# Patient Record
Sex: FEMALE | Race: OTHER | HISPANIC OR LATINO | URBAN - METROPOLITAN AREA
[De-identification: names, ages, dates, MRNs, and addresses within clinical notes are randomized per-mention and may not be internally consistent; named-entity substitution may affect disease eponyms.]

---

## 2016-12-14 RX ORDER — LISINOPRIL 2.5 MG/1
2 TABLET ORAL
Qty: 30 | Refills: 0 | COMMUNITY
Start: 2016-12-14 | End: 2017-01-13

## 2016-12-14 RX ORDER — LISINOPRIL 2.5 MG/1
2 TABLET ORAL
Qty: 30 | Refills: 0 | DISCHARGE
Start: 2016-12-14 | End: 2017-01-13

## 2017-05-01 ENCOUNTER — INPATIENT (INPATIENT)
Facility: HOSPITAL | Age: 72
LOS: 2 days | Discharge: HOME | DRG: 884 | End: 2017-05-04
Attending: HOSPITALIST | Admitting: HOSPITALIST
Payer: COMMERCIAL

## 2017-05-01 VITALS
HEIGHT: 61 IN | SYSTOLIC BLOOD PRESSURE: 135 MMHG | RESPIRATION RATE: 18 BRPM | HEART RATE: 110 BPM | WEIGHT: 110.01 LBS | TEMPERATURE: 98 F | OXYGEN SATURATION: 100 % | DIASTOLIC BLOOD PRESSURE: 79 MMHG

## 2017-05-01 LAB
ALBUMIN SERPL ELPH-MCNC: 3.3 G/DL — LOW (ref 3.5–5)
ALP SERPL-CCNC: 81 U/L — SIGNIFICANT CHANGE UP (ref 40–120)
ALT FLD-CCNC: 15 U/L DA — SIGNIFICANT CHANGE UP (ref 10–60)
ANION GAP SERPL CALC-SCNC: 12 MMOL/L — SIGNIFICANT CHANGE UP (ref 5–17)
APPEARANCE UR: CLEAR — SIGNIFICANT CHANGE UP
AST SERPL-CCNC: 14 U/L — SIGNIFICANT CHANGE UP (ref 10–40)
BASOPHILS # BLD AUTO: 0 K/UL — SIGNIFICANT CHANGE UP (ref 0–0.2)
BASOPHILS NFR BLD AUTO: 0.5 % — SIGNIFICANT CHANGE UP (ref 0–2)
BILIRUB SERPL-MCNC: 0.7 MG/DL — SIGNIFICANT CHANGE UP (ref 0.2–1.2)
BILIRUB UR-MCNC: NEGATIVE — SIGNIFICANT CHANGE UP
BUN SERPL-MCNC: 39 MG/DL — HIGH (ref 7–18)
CALCIUM SERPL-MCNC: 8.9 MG/DL — SIGNIFICANT CHANGE UP (ref 8.4–10.5)
CHLORIDE SERPL-SCNC: 99 MMOL/L — SIGNIFICANT CHANGE UP (ref 96–108)
CO2 SERPL-SCNC: 28 MMOL/L — SIGNIFICANT CHANGE UP (ref 22–31)
COLOR SPEC: YELLOW — SIGNIFICANT CHANGE UP
CREAT SERPL-MCNC: 1.48 MG/DL — HIGH (ref 0.5–1.3)
DIFF PNL FLD: ABNORMAL
EOSINOPHIL # BLD AUTO: 0.1 K/UL — SIGNIFICANT CHANGE UP (ref 0–0.5)
EOSINOPHIL NFR BLD AUTO: 0.6 % — SIGNIFICANT CHANGE UP (ref 0–6)
GLUCOSE SERPL-MCNC: 275 MG/DL — HIGH (ref 70–99)
GLUCOSE UR QL: 1000 MG/DL
HCT VFR BLD CALC: 41.1 % — SIGNIFICANT CHANGE UP (ref 34.5–45)
HGB BLD-MCNC: 12.9 G/DL — SIGNIFICANT CHANGE UP (ref 11.5–15.5)
KETONES UR-MCNC: ABNORMAL
LACTATE SERPL-SCNC: 2 MMOL/L — SIGNIFICANT CHANGE UP (ref 0.7–2)
LEUKOCYTE ESTERASE UR-ACNC: NEGATIVE — SIGNIFICANT CHANGE UP
LYMPHOCYTES # BLD AUTO: 1.6 K/UL — SIGNIFICANT CHANGE UP (ref 1–3.3)
LYMPHOCYTES # BLD AUTO: 17.8 % — SIGNIFICANT CHANGE UP (ref 13–44)
MCHC RBC-ENTMCNC: 25.2 PG — LOW (ref 27–34)
MCHC RBC-ENTMCNC: 31.3 GM/DL — LOW (ref 32–36)
MCV RBC AUTO: 80.5 FL — SIGNIFICANT CHANGE UP (ref 80–100)
MONOCYTES # BLD AUTO: 0.6 K/UL — SIGNIFICANT CHANGE UP (ref 0–0.9)
MONOCYTES NFR BLD AUTO: 6.9 % — SIGNIFICANT CHANGE UP (ref 2–14)
NEUTROPHILS # BLD AUTO: 6.8 K/UL — SIGNIFICANT CHANGE UP (ref 1.8–7.4)
NEUTROPHILS NFR BLD AUTO: 74.2 % — SIGNIFICANT CHANGE UP (ref 43–77)
NITRITE UR-MCNC: NEGATIVE — SIGNIFICANT CHANGE UP
PH UR: 5 — SIGNIFICANT CHANGE UP (ref 5–8)
PLATELET # BLD AUTO: 364 K/UL — SIGNIFICANT CHANGE UP (ref 150–400)
POTASSIUM SERPL-MCNC: 3.5 MMOL/L — SIGNIFICANT CHANGE UP (ref 3.5–5.3)
POTASSIUM SERPL-SCNC: 3.5 MMOL/L — SIGNIFICANT CHANGE UP (ref 3.5–5.3)
PROT SERPL-MCNC: 6.9 G/DL — SIGNIFICANT CHANGE UP (ref 6–8.3)
PROT UR-MCNC: 30 MG/DL
RBC # BLD: 5.11 M/UL — SIGNIFICANT CHANGE UP (ref 3.8–5.2)
RBC # FLD: 15.5 % — HIGH (ref 10.3–14.5)
SODIUM SERPL-SCNC: 139 MMOL/L — SIGNIFICANT CHANGE UP (ref 135–145)
SP GR SPEC: 1.01 — SIGNIFICANT CHANGE UP (ref 1.01–1.02)
UROBILINOGEN FLD QL: NEGATIVE — SIGNIFICANT CHANGE UP
WBC # BLD: 9.2 K/UL — SIGNIFICANT CHANGE UP (ref 3.8–10.5)
WBC # FLD AUTO: 9.2 K/UL — SIGNIFICANT CHANGE UP (ref 3.8–10.5)

## 2017-05-01 PROCEDURE — 71010: CPT | Mod: 26

## 2017-05-01 RX ORDER — SODIUM CHLORIDE 9 MG/ML
1000 INJECTION INTRAMUSCULAR; INTRAVENOUS; SUBCUTANEOUS ONCE
Qty: 0 | Refills: 0 | Status: COMPLETED | OUTPATIENT
Start: 2017-05-01 | End: 2017-05-01

## 2017-05-01 RX ORDER — SODIUM CHLORIDE 9 MG/ML
1000 INJECTION INTRAMUSCULAR; INTRAVENOUS; SUBCUTANEOUS
Qty: 0 | Refills: 0 | Status: DISCONTINUED | OUTPATIENT
Start: 2017-05-01 | End: 2017-05-02

## 2017-05-01 RX ADMIN — SODIUM CHLORIDE 150 MILLILITER(S): 9 INJECTION INTRAMUSCULAR; INTRAVENOUS; SUBCUTANEOUS at 23:30

## 2017-05-01 RX ADMIN — SODIUM CHLORIDE 3000 MILLILITER(S): 9 INJECTION INTRAMUSCULAR; INTRAVENOUS; SUBCUTANEOUS at 23:08

## 2017-05-01 NOTE — ED PROVIDER NOTE - OBJECTIVE STATEMENT
Daughter Dora states pt with worsening dementia, confusion, weakness and not eating and driking suffiently x 1 week. Daughter Dora states pt with worsening dementia, confusion, weakness and not eating and dirking sufficiently x 1 week.

## 2017-05-01 NOTE — ED ADULT NURSE NOTE - OBJECTIVE STATEMENT
As per daughter, patient is having poor appetite, dementia seems to be getting worse. Vision is also getting worse, patient is legally blind.

## 2017-05-01 NOTE — ED PROVIDER NOTE - MEDICAL DECISION MAKING DETAILS
AR and DR. James endorsed. Pt's daughter agrees with admission for IV hydration. I had a detailed discussion with the patient and/or guardian regarding the historical points, exam findings, and any diagnostic results supporting the admit diagnosis. MAR and DR. James endorsed. Pt's daughter agrees with admission for IV hydration. I had a detailed discussion with the patient and/or guardian regarding the historical points, exam findings, and any diagnostic results supporting the admit diagnosis.

## 2017-05-02 DIAGNOSIS — Z29.9 ENCOUNTER FOR PROPHYLACTIC MEASURES, UNSPECIFIED: ICD-10-CM

## 2017-05-02 DIAGNOSIS — R62.7 ADULT FAILURE TO THRIVE: ICD-10-CM

## 2017-05-02 DIAGNOSIS — I10 ESSENTIAL (PRIMARY) HYPERTENSION: ICD-10-CM

## 2017-05-02 DIAGNOSIS — F32.9 MAJOR DEPRESSIVE DISORDER, SINGLE EPISODE, UNSPECIFIED: ICD-10-CM

## 2017-05-02 DIAGNOSIS — R73.9 HYPERGLYCEMIA, UNSPECIFIED: ICD-10-CM

## 2017-05-02 DIAGNOSIS — N17.9 ACUTE KIDNEY FAILURE, UNSPECIFIED: ICD-10-CM

## 2017-05-02 LAB
ANION GAP SERPL CALC-SCNC: 7 MMOL/L — SIGNIFICANT CHANGE UP (ref 5–17)
BUN SERPL-MCNC: 24 MG/DL — HIGH (ref 7–18)
CALCIUM SERPL-MCNC: 9 MG/DL — SIGNIFICANT CHANGE UP (ref 8.4–10.5)
CHLORIDE SERPL-SCNC: 105 MMOL/L — SIGNIFICANT CHANGE UP (ref 96–108)
CHOLEST SERPL-MCNC: 180 MG/DL — SIGNIFICANT CHANGE UP (ref 10–199)
CO2 SERPL-SCNC: 32 MMOL/L — HIGH (ref 22–31)
CREAT SERPL-MCNC: 1.07 MG/DL — SIGNIFICANT CHANGE UP (ref 0.5–1.3)
FOLATE SERPL-MCNC: 13.6 NG/ML — SIGNIFICANT CHANGE UP (ref 4.8–24.2)
GLUCOSE SERPL-MCNC: 114 MG/DL — HIGH (ref 70–99)
HBA1C BLD-MCNC: 9.4 % — HIGH (ref 4–5.6)
HCT VFR BLD CALC: 39.2 % — SIGNIFICANT CHANGE UP (ref 34.5–45)
HDLC SERPL-MCNC: 38 MG/DL — LOW (ref 40–125)
HGB BLD-MCNC: 12.3 G/DL — SIGNIFICANT CHANGE UP (ref 11.5–15.5)
LACTATE SERPL-SCNC: 1.4 MMOL/L — SIGNIFICANT CHANGE UP (ref 0.7–2)
LIPID PNL WITH DIRECT LDL SERPL: 111 MG/DL — SIGNIFICANT CHANGE UP
MAGNESIUM SERPL-MCNC: 2.1 MG/DL — SIGNIFICANT CHANGE UP (ref 1.8–2.4)
MCHC RBC-ENTMCNC: 25.2 PG — LOW (ref 27–34)
MCHC RBC-ENTMCNC: 31.4 GM/DL — LOW (ref 32–36)
MCV RBC AUTO: 80.4 FL — SIGNIFICANT CHANGE UP (ref 80–100)
OSMOLALITY SERPL: 305 MOS/KG — HIGH (ref 275–300)
OSMOLALITY UR: 363 MOS/KG — SIGNIFICANT CHANGE UP (ref 50–1200)
PHOSPHATE SERPL-MCNC: 1.1 MG/DL — LOW (ref 2.5–4.5)
PLATELET # BLD AUTO: 319 K/UL — SIGNIFICANT CHANGE UP (ref 150–400)
POTASSIUM SERPL-MCNC: 3.2 MMOL/L — LOW (ref 3.5–5.3)
POTASSIUM SERPL-SCNC: 3.2 MMOL/L — LOW (ref 3.5–5.3)
RBC # BLD: 4.87 M/UL — SIGNIFICANT CHANGE UP (ref 3.8–5.2)
RBC # FLD: 15.7 % — HIGH (ref 10.3–14.5)
SODIUM SERPL-SCNC: 144 MMOL/L — SIGNIFICANT CHANGE UP (ref 135–145)
TOTAL CHOLESTEROL/HDL RATIO MEASUREMENT: 4.7 RATIO — SIGNIFICANT CHANGE UP (ref 3.3–7.1)
TRIGL SERPL-MCNC: 157 MG/DL — HIGH (ref 10–149)
URATE SERPL-MCNC: 6.8 MG/DL — SIGNIFICANT CHANGE UP (ref 2.5–7)
VIT B12 SERPL-MCNC: 1012 PG/ML — HIGH (ref 243–894)
WBC # BLD: 8 K/UL — SIGNIFICANT CHANGE UP (ref 3.8–10.5)
WBC # FLD AUTO: 8 K/UL — SIGNIFICANT CHANGE UP (ref 3.8–10.5)

## 2017-05-02 PROCEDURE — 99285 EMERGENCY DEPT VISIT HI MDM: CPT | Mod: 25

## 2017-05-02 PROCEDURE — 99223 1ST HOSP IP/OBS HIGH 75: CPT | Mod: GC

## 2017-05-02 PROCEDURE — 76775 US EXAM ABDO BACK WALL LIM: CPT | Mod: 26

## 2017-05-02 RX ORDER — HALOPERIDOL DECANOATE 100 MG/ML
0.5 INJECTION INTRAMUSCULAR EVERY 12 HOURS
Qty: 0 | Refills: 0 | Status: DISCONTINUED | OUTPATIENT
Start: 2017-05-02 | End: 2017-05-04

## 2017-05-02 RX ORDER — POTASSIUM CHLORIDE 20 MEQ
20 PACKET (EA) ORAL
Qty: 0 | Refills: 0 | Status: COMPLETED | OUTPATIENT
Start: 2017-05-02 | End: 2017-05-02

## 2017-05-02 RX ORDER — LISINOPRIL 2.5 MG/1
20 TABLET ORAL DAILY
Qty: 0 | Refills: 0 | Status: DISCONTINUED | OUTPATIENT
Start: 2017-05-02 | End: 2017-05-04

## 2017-05-02 RX ORDER — SODIUM CHLORIDE 9 MG/ML
1000 INJECTION, SOLUTION INTRAVENOUS
Qty: 0 | Refills: 0 | Status: DISCONTINUED | OUTPATIENT
Start: 2017-05-02 | End: 2017-05-03

## 2017-05-02 RX ORDER — SODIUM CHLORIDE 9 MG/ML
1000 INJECTION, SOLUTION INTRAVENOUS
Qty: 0 | Refills: 0 | Status: DISCONTINUED | OUTPATIENT
Start: 2017-05-02 | End: 2017-05-02

## 2017-05-02 RX ORDER — TICAGRELOR 90 MG/1
90 TABLET ORAL
Qty: 0 | Refills: 0 | Status: DISCONTINUED | OUTPATIENT
Start: 2017-05-02 | End: 2017-05-04

## 2017-05-02 RX ORDER — RISPERIDONE 4 MG/1
1 TABLET ORAL
Qty: 0 | Refills: 0 | Status: DISCONTINUED | OUTPATIENT
Start: 2017-05-02 | End: 2017-05-02

## 2017-05-02 RX ORDER — INSULIN LISPRO 100/ML
5 VIAL (ML) SUBCUTANEOUS ONCE
Qty: 0 | Refills: 0 | Status: COMPLETED | OUTPATIENT
Start: 2017-05-02 | End: 2017-05-02

## 2017-05-02 RX ORDER — HEPARIN SODIUM 5000 [USP'U]/ML
5000 INJECTION INTRAVENOUS; SUBCUTANEOUS EVERY 12 HOURS
Qty: 0 | Refills: 0 | Status: DISCONTINUED | OUTPATIENT
Start: 2017-05-02 | End: 2017-05-04

## 2017-05-02 RX ORDER — AMLODIPINE BESYLATE 2.5 MG/1
10 TABLET ORAL DAILY
Qty: 0 | Refills: 0 | Status: DISCONTINUED | OUTPATIENT
Start: 2017-05-02 | End: 2017-05-04

## 2017-05-02 RX ORDER — ASPIRIN/CALCIUM CARB/MAGNESIUM 324 MG
81 TABLET ORAL DAILY
Qty: 0 | Refills: 0 | Status: DISCONTINUED | OUTPATIENT
Start: 2017-05-02 | End: 2017-05-04

## 2017-05-02 RX ORDER — POTASSIUM PHOSPHATE, MONOBASIC POTASSIUM PHOSPHATE, DIBASIC 236; 224 MG/ML; MG/ML
15 INJECTION, SOLUTION INTRAVENOUS ONCE
Qty: 0 | Refills: 0 | Status: COMPLETED | OUTPATIENT
Start: 2017-05-02 | End: 2017-05-02

## 2017-05-02 RX ORDER — INSULIN GLARGINE 100 [IU]/ML
15 INJECTION, SOLUTION SUBCUTANEOUS AT BEDTIME
Qty: 0 | Refills: 0 | Status: DISCONTINUED | OUTPATIENT
Start: 2017-05-02 | End: 2017-05-03

## 2017-05-02 RX ORDER — ATORVASTATIN CALCIUM 80 MG/1
20 TABLET, FILM COATED ORAL AT BEDTIME
Qty: 0 | Refills: 0 | Status: DISCONTINUED | OUTPATIENT
Start: 2017-05-02 | End: 2017-05-04

## 2017-05-02 RX ORDER — MIRTAZAPINE 45 MG/1
15 TABLET, ORALLY DISINTEGRATING ORAL AT BEDTIME
Qty: 0 | Refills: 0 | Status: DISCONTINUED | OUTPATIENT
Start: 2017-05-02 | End: 2017-05-02

## 2017-05-02 RX ORDER — METOPROLOL TARTRATE 50 MG
25 TABLET ORAL DAILY
Qty: 0 | Refills: 0 | Status: DISCONTINUED | OUTPATIENT
Start: 2017-05-02 | End: 2017-05-04

## 2017-05-02 RX ORDER — SODIUM CHLORIDE 9 MG/ML
1000 INJECTION INTRAMUSCULAR; INTRAVENOUS; SUBCUTANEOUS
Qty: 0 | Refills: 0 | Status: DISCONTINUED | OUTPATIENT
Start: 2017-05-02 | End: 2017-05-02

## 2017-05-02 RX ORDER — INSULIN LISPRO 100/ML
VIAL (ML) SUBCUTANEOUS
Qty: 0 | Refills: 0 | Status: DISCONTINUED | OUTPATIENT
Start: 2017-05-02 | End: 2017-05-04

## 2017-05-02 RX ADMIN — SODIUM CHLORIDE 75 MILLILITER(S): 9 INJECTION INTRAMUSCULAR; INTRAVENOUS; SUBCUTANEOUS at 02:15

## 2017-05-02 RX ADMIN — Medication 20 MILLIEQUIVALENT(S): at 12:31

## 2017-05-02 RX ADMIN — Medication 3: at 17:06

## 2017-05-02 RX ADMIN — SODIUM CHLORIDE 75 MILLILITER(S): 9 INJECTION, SOLUTION INTRAVENOUS at 10:02

## 2017-05-02 RX ADMIN — Medication 20 MILLIEQUIVALENT(S): at 10:02

## 2017-05-02 RX ADMIN — RISPERIDONE 1 MILLIGRAM(S): 4 TABLET ORAL at 17:07

## 2017-05-02 RX ADMIN — Medication 20 MILLIEQUIVALENT(S): at 14:53

## 2017-05-02 RX ADMIN — RISPERIDONE 1 MILLIGRAM(S): 4 TABLET ORAL at 05:51

## 2017-05-02 RX ADMIN — AMLODIPINE BESYLATE 10 MILLIGRAM(S): 2.5 TABLET ORAL at 01:35

## 2017-05-02 RX ADMIN — POTASSIUM PHOSPHATE, MONOBASIC POTASSIUM PHOSPHATE, DIBASIC 62.5 MILLIMOLE(S): 236; 224 INJECTION, SOLUTION INTRAVENOUS at 10:02

## 2017-05-02 RX ADMIN — Medication 2: at 12:30

## 2017-05-02 RX ADMIN — HALOPERIDOL DECANOATE 0.5 MILLIGRAM(S): 100 INJECTION INTRAMUSCULAR at 21:16

## 2017-05-02 RX ADMIN — INSULIN GLARGINE 15 UNIT(S): 100 INJECTION, SOLUTION SUBCUTANEOUS at 01:36

## 2017-05-02 RX ADMIN — Medication 25 MILLIGRAM(S): at 01:35

## 2017-05-02 RX ADMIN — LISINOPRIL 20 MILLIGRAM(S): 2.5 TABLET ORAL at 05:51

## 2017-05-02 RX ADMIN — Medication 81 MILLIGRAM(S): at 12:31

## 2017-05-02 RX ADMIN — INSULIN GLARGINE 15 UNIT(S): 100 INJECTION, SOLUTION SUBCUTANEOUS at 21:15

## 2017-05-02 RX ADMIN — SODIUM CHLORIDE 75 MILLILITER(S): 9 INJECTION INTRAMUSCULAR; INTRAVENOUS; SUBCUTANEOUS at 07:58

## 2017-05-02 RX ADMIN — TICAGRELOR 90 MILLIGRAM(S): 90 TABLET ORAL at 05:51

## 2017-05-02 RX ADMIN — TICAGRELOR 90 MILLIGRAM(S): 90 TABLET ORAL at 17:07

## 2017-05-02 NOTE — H&P ADULT. - PROBLEM SELECTOR PLAN 3
Pt has DM 2 with hyperglycemia of 275 likely due to non compliance with Insulin and metformin. Pt has DM 2 with hyperglycemia of 275 likely due to non compliance with Insulin and metformin. No AG elevation and not in DKA.  will give lantus 15 U instead of 24 U as pt not eating much, adjust as appropriate  f/u accuchecks and hba1c

## 2017-05-02 NOTE — H&P ADULT. - PROBLEM SELECTOR PLAN 1
Pt has decreased PO intake and decreased appetite likely due to worsening dementia and depression as not compliant with her meds since 1 month.  Pt has elevated BUN and Creatinine 39 /1.48, likely prerenal due to decreased PO intake and dehydration.  f/u urine lytes  s/p NS 2 lt bolus in ED.  Will continue NS @ 75 cc/hr  monitor bmp Pt has decreased PO intake and decreased appetite likely due to worsening dementia and depression as not compliant with her meds since 1 month.  Pt has elevated BUN and Creatinine 39 /1.48, likely prerenal due to decreased PO intake and dehydration.  s/p NS 2 lt bolus in ED.  Will continue NS @ 75 cc/hr  UA showed no UTI, CXR showed elevation of Rt hemisphere, but no consolidation.  DASH Carb consistent Diet  Nutrition consult  Psych consult Dr. pitt

## 2017-05-02 NOTE — H&P ADULT. - NEGATIVE ENMT SYMPTOMS
no sinus symptoms/no nasal discharge/no vertigo/no tinnitus/no ear pain/no nasal congestion/no hearing difficulty

## 2017-05-02 NOTE — H&P ADULT. - HISTORY OF PRESENT ILLNESS
72 F from home, lives with daughter, PMH of  Dementia, Depression, DM, HTN, HLD, CAD x3 stents last stent in Aug 2016, Glaucoma, legally blind, brought in by Daughter Dora with worsening dementia, weakness and not eating and drinking sufficiently since 1 week. Pt is AAOX3 currently, but daughter states she goes on and off and sometimes not totally oriented. 72 F from home, lives with daughter, PMH of  Dementia, Depression, DM, HTN, HLD, CAD x3 stents last stent in Aug 2016, Glaucoma, legally blind, brought in by Daughter Dora with worsening dementia, weakness and not eating and drinking sufficiently since 1 week. Pt is AAOX3 currently, but daughter states she goes on and off and sometimes not totally oriented. As per daughter pt is refusing to take any of her meds since 1 month and not on Insulin since 2 weeks as she is not eating or drinking much. When asked pt states she is not eating much as she is not having appetite. She was here in December 2016 for agitation and before that she was in inpatient psych facility for 7 weeks. Pt denies CP, SOB, Abd pain, dysuria, fever, chills, dizziness, palpitations, nausea, vomiting, diarrhea, LOC, head trauma. 72 F from home, lives with daughter, PMH of  Dementia, Depression, DM, HTN, HLD, CAD x3 stents last stent in Aug 2016, Glaucoma, legally blind, brought in by Daughter Dora with worsening dementia, weakness and not eating and drinking sufficiently since 1 week. Pt is AAOX3 currently, but daughter states she goes on and off and sometimes not totally oriented. As per daughter pt is refusing to take any of her meds since 1 month and not on Insulin since 2 weeks as she is not eating or drinking much, the daughter attributes it to not taking psych meds. When asked pt states she is not eating much as she is not having appetite. She was here in December 2016 for agitation and before that she was in inpatient psych facility for 7 weeks. Pt denies CP, SOB, Abd pain, dysuria, fever, chills, dizziness, palpitations, nausea, vomiting, diarrhea, LOC, head trauma.

## 2017-05-02 NOTE — H&P ADULT. - PROBLEM SELECTOR PLAN 5
continue Risperdal 1 mg twice a day.  will give Mirtazapine 15 mg at bedtime as daughter says 30 mg that she was supposed to be on is knocking her down and she sleeps day and night while on 30 mg.  Consider Psych consult

## 2017-05-02 NOTE — H&P ADULT. - ATTENDING COMMENTS
patient was seen and examined at bedside, agree with above including delineated plan of care.  patient seen in am, had good appetite eating her breakfast,  continue with caloric count, psych evaluation for medication adjustement.  rule out infection as contributing cause.

## 2017-05-02 NOTE — H&P ADULT. - NEGATIVE NEUROLOGICAL SYMPTOMS
no loss of consciousness/no vertigo/no hemiparesis/no facial palsy/no loss of sensation/no tremors/no generalized seizures/no headache/no transient paralysis/no confusion/no difficulty walking/no syncope/no focal seizures/no weakness/no paresthesias

## 2017-05-02 NOTE — H&P ADULT. - NEGATIVE GASTROINTESTINAL SYMPTOMS
no constipation/no nausea/no abdominal pain/no melena/no flatulence/no jaundice/no vomiting/no diarrhea/no hematochezia

## 2017-05-02 NOTE — H&P ADULT. - NEGATIVE MUSCULOSKELETAL SYMPTOMS
no leg pain R/no leg pain L/no arm pain R/no arm pain L/no back pain/no neck pain/no muscle weakness/no myalgia

## 2017-05-02 NOTE — H&P ADULT. - PROBLEM SELECTOR PLAN 2
Pt has elevated Creatinine Pt has elevated BUN and Creatinine 39 /1.48, likely prerenal due to decreased PO intake and dehydration.  f/u urine lytes  s/p NS 2 lt bolus in ED.  Will continue NS @ 75 cc/hr  monitor bmp Pt has elevated BUN and Creatinine 39 /1.48, likely prerenal or intrinsic due to decreased PO intake and dehydration.  FENA 2.9, likely Intrinsic Kidney injury.  f/u USG Abd  s/p NS 2 lt bolus in ED.  Will continue NS @ 75 cc/hr  monitor bmp

## 2017-05-02 NOTE — H&P ADULT. - RS GEN PE MLT RESP DETAILS PC
normal/clear to auscultation bilaterally/no rhonchi/no chest wall tenderness/no rales/no wheezes/breath sounds equal

## 2017-05-02 NOTE — H&P ADULT. - NEGATIVE CARDIOVASCULAR SYMPTOMS
no peripheral edema/no paroxysmal nocturnal dyspnea/no palpitations/no dyspnea on exertion/no orthopnea/no chest pain

## 2017-05-02 NOTE — H&P ADULT. - ASSESSMENT
72 F from home, lives with daughter, PMH of  Dementia, Depression, DM, HTN, HLD, CAD x3 stents last stent in Aug 2016, Glaucoma, legally blind, brought in by Daughter Dora with worsening dementia, weakness and not eating and drinking sufficiently since 1 week. Pt is AAOX3 currently, but daughter states she goes on and off and sometimes not totally oriented. As per daughter pt is refusing to take any of her meds since 1 month and not on Insulin since 2 weeks as she is not eating or drinking much, the daughter attributes it to not taking psych meds. When asked pt states she is not eating much as she is not having appetite. She was here in December 2016 for agitation and before that she was in inpatient psych facility for 7 weeks. Pt denies CP, SOB, Abd pain, dysuria, fever, chills, dizziness, palpitations, nausea, vomiting, diarrhea, LOC, head trauma.

## 2017-05-02 NOTE — H&P ADULT. - NEGATIVE OPHTHALMOLOGIC SYMPTOMS
no discharge L/no diplopia/no discharge R/no blurred vision R/no blurred vision L/no lacrimation L/no lacrimation R/no photophobia

## 2017-05-03 LAB
ANION GAP SERPL CALC-SCNC: 10 MMOL/L — SIGNIFICANT CHANGE UP (ref 5–17)
BUN SERPL-MCNC: 10 MG/DL — SIGNIFICANT CHANGE UP (ref 7–18)
CALCIUM SERPL-MCNC: 8.6 MG/DL — SIGNIFICANT CHANGE UP (ref 8.4–10.5)
CHLORIDE SERPL-SCNC: 105 MMOL/L — SIGNIFICANT CHANGE UP (ref 96–108)
CO2 SERPL-SCNC: 25 MMOL/L — SIGNIFICANT CHANGE UP (ref 22–31)
CREAT SERPL-MCNC: 1 MG/DL — SIGNIFICANT CHANGE UP (ref 0.5–1.3)
CULTURE RESULTS: NO GROWTH — SIGNIFICANT CHANGE UP
EOSINOPHIL NFR URNS MANUAL: NEGATIVE — SIGNIFICANT CHANGE UP
GLUCOSE SERPL-MCNC: 124 MG/DL — HIGH (ref 70–99)
HCT VFR BLD CALC: 33.7 % — LOW (ref 34.5–45)
HGB BLD-MCNC: 10.9 G/DL — LOW (ref 11.5–15.5)
MCHC RBC-ENTMCNC: 25.7 PG — LOW (ref 27–34)
MCHC RBC-ENTMCNC: 32.3 GM/DL — SIGNIFICANT CHANGE UP (ref 32–36)
MCV RBC AUTO: 79.7 FL — LOW (ref 80–100)
PLATELET # BLD AUTO: 267 K/UL — SIGNIFICANT CHANGE UP (ref 150–400)
POTASSIUM SERPL-MCNC: 3.3 MMOL/L — LOW (ref 3.5–5.3)
POTASSIUM SERPL-SCNC: 3.3 MMOL/L — LOW (ref 3.5–5.3)
RBC # BLD: 4.23 M/UL — SIGNIFICANT CHANGE UP (ref 3.8–5.2)
RBC # FLD: 15.4 % — HIGH (ref 10.3–14.5)
SODIUM SERPL-SCNC: 140 MMOL/L — SIGNIFICANT CHANGE UP (ref 135–145)
SPECIMEN SOURCE: SIGNIFICANT CHANGE UP
WBC # BLD: 6.5 K/UL — SIGNIFICANT CHANGE UP (ref 3.8–10.5)
WBC # FLD AUTO: 6.5 K/UL — SIGNIFICANT CHANGE UP (ref 3.8–10.5)

## 2017-05-03 PROCEDURE — 99233 SBSQ HOSP IP/OBS HIGH 50: CPT | Mod: GC

## 2017-05-03 RX ORDER — POTASSIUM CHLORIDE 20 MEQ
40 PACKET (EA) ORAL EVERY 4 HOURS
Qty: 0 | Refills: 0 | Status: COMPLETED | OUTPATIENT
Start: 2017-05-03 | End: 2017-05-03

## 2017-05-03 RX ORDER — POTASSIUM CHLORIDE 20 MEQ
40 PACKET (EA) ORAL EVERY 4 HOURS
Qty: 0 | Refills: 0 | Status: DISCONTINUED | OUTPATIENT
Start: 2017-05-03 | End: 2017-05-03

## 2017-05-03 RX ORDER — SODIUM,POTASSIUM PHOSPHATES 278-250MG
1 POWDER IN PACKET (EA) ORAL
Qty: 0 | Refills: 0 | Status: DISCONTINUED | OUTPATIENT
Start: 2017-05-03 | End: 2017-05-04

## 2017-05-03 RX ORDER — INSULIN GLARGINE 100 [IU]/ML
10 INJECTION, SOLUTION SUBCUTANEOUS AT BEDTIME
Qty: 0 | Refills: 0 | Status: DISCONTINUED | OUTPATIENT
Start: 2017-05-03 | End: 2017-05-04

## 2017-05-03 RX ADMIN — HALOPERIDOL DECANOATE 0.5 MILLIGRAM(S): 100 INJECTION INTRAMUSCULAR at 05:35

## 2017-05-03 RX ADMIN — Medication 81 MILLIGRAM(S): at 11:03

## 2017-05-03 RX ADMIN — TICAGRELOR 90 MILLIGRAM(S): 90 TABLET ORAL at 05:34

## 2017-05-03 RX ADMIN — SODIUM CHLORIDE 75 MILLILITER(S): 9 INJECTION, SOLUTION INTRAVENOUS at 00:10

## 2017-05-03 RX ADMIN — HEPARIN SODIUM 5000 UNIT(S): 5000 INJECTION INTRAVENOUS; SUBCUTANEOUS at 05:36

## 2017-05-03 RX ADMIN — HALOPERIDOL DECANOATE 0.5 MILLIGRAM(S): 100 INJECTION INTRAMUSCULAR at 17:43

## 2017-05-03 RX ADMIN — Medication 40 MILLIEQUIVALENT(S): at 13:51

## 2017-05-03 RX ADMIN — LISINOPRIL 20 MILLIGRAM(S): 2.5 TABLET ORAL at 05:34

## 2017-05-03 RX ADMIN — Medication 3: at 11:53

## 2017-05-03 RX ADMIN — Medication 1 TABLET(S): at 17:43

## 2017-05-03 RX ADMIN — Medication 1 TABLET(S): at 11:05

## 2017-05-03 RX ADMIN — Medication 25 MILLIGRAM(S): at 05:34

## 2017-05-03 RX ADMIN — ATORVASTATIN CALCIUM 20 MILLIGRAM(S): 80 TABLET, FILM COATED ORAL at 21:13

## 2017-05-03 RX ADMIN — Medication 3: at 17:19

## 2017-05-03 RX ADMIN — Medication 40 MILLIEQUIVALENT(S): at 10:57

## 2017-05-03 RX ADMIN — TICAGRELOR 90 MILLIGRAM(S): 90 TABLET ORAL at 17:43

## 2017-05-03 RX ADMIN — Medication 5 UNIT(S): at 00:10

## 2017-05-03 RX ADMIN — Medication 1 TABLET(S): at 21:13

## 2017-05-03 RX ADMIN — INSULIN GLARGINE 10 UNIT(S): 100 INJECTION, SOLUTION SUBCUTANEOUS at 21:12

## 2017-05-03 RX ADMIN — HEPARIN SODIUM 5000 UNIT(S): 5000 INJECTION INTRAVENOUS; SUBCUTANEOUS at 17:43

## 2017-05-03 RX ADMIN — AMLODIPINE BESYLATE 10 MILLIGRAM(S): 2.5 TABLET ORAL at 05:34

## 2017-05-03 NOTE — PHYSICAL THERAPY INITIAL EVALUATION ADULT - TRANSFER SAFETY CONCERNS NOTED: BED/CHAIR, REHAB EVAL
decreased safety awareness/decreased balance during turns/decreased weight-shifting ability/decreased step length

## 2017-05-03 NOTE — DIETITIAN INITIAL EVALUATION ADULT. - PROBLEM SELECTOR PLAN 1
Pt has decreased PO intake and decreased appetite likely due to worsening dementia and depression as not compliant with her meds since 1 month.  Pt has elevated BUN and Creatinine 39 /1.48, likely prerenal due to decreased PO intake and dehydration.  s/p NS 2 lt bolus in ED.  Will continue NS @ 75 cc/hr  UA showed no UTI, CXR showed elevation of Rt hemisphere, but no consolidation.  DASH Carb consistent Diet  Nutrition consult  Psych consult Dr. pitt

## 2017-05-03 NOTE — PHYSICAL THERAPY INITIAL EVALUATION ADULT - GAIT DEVIATIONS NOTED, PT EVAL
decreased velocity of limb motion/decreased weight-shifting ability/decreased jose/increased time in double stance/decreased step length

## 2017-05-03 NOTE — PHYSICAL THERAPY INITIAL EVALUATION ADULT - IMPAIRMENTS FOUND, PT EVAL
aerobic capacity/endurance/cognitive impairment/poor safety awareness/muscle strength/gait, locomotion, and balance

## 2017-05-03 NOTE — DIETITIAN INITIAL EVALUATION ADULT. - PROBLEM SELECTOR PLAN 3
Pt has DM 2 with hyperglycemia of 275 likely due to non compliance with Insulin and metformin. No AG elevation and not in DKA.  will give lantus 15 U instead of 24 U as pt not eating much, adjust as appropriate  f/u accuchecks and hba1c

## 2017-05-03 NOTE — DIETITIAN INITIAL EVALUATION ADULT. - OTHER INFO
Pt visited. OOB to chair. Pt  is confused.At present pt is eating Good. PTA pt did not eat or Drink x 1 week. Non compliance to the medication did not take her Insulin x 2 weeks  PTA.+ weight loss of ~ 24 lbsx 6 months.Offered Glucerna shakes But family Refused Reports Gives Diarrhea

## 2017-05-03 NOTE — DIETITIAN INITIAL EVALUATION ADULT. - PROBLEM SELECTOR PLAN 2
Pt has elevated BUN and Creatinine 39 /1.48, likely prerenal or intrinsic due to decreased PO intake and dehydration.  FENA 2.9, likely Intrinsic Kidney injury.  f/u USG Abd  s/p NS 2 lt bolus in ED.  Will continue NS @ 75 cc/hr  monitor bmp

## 2017-05-03 NOTE — PHYSICAL THERAPY INITIAL EVALUATION ADULT - TRANSFER SAFETY CONCERNS NOTED: SIT/STAND, REHAB EVAL
decreased weight-shifting ability/decreased step length/decreased balance during turns/decreased safety awareness

## 2017-05-03 NOTE — PHYSICAL THERAPY INITIAL EVALUATION ADULT - CRITERIA FOR SKILLED THERAPEUTIC INTERVENTIONS
rehab potential/therapy frequency/functional limitations in following categories/risk reduction/prevention/impairments found/predicted duration of therapy intervention/anticipated discharge recommendation

## 2017-05-04 VITALS
SYSTOLIC BLOOD PRESSURE: 118 MMHG | TEMPERATURE: 98 F | DIASTOLIC BLOOD PRESSURE: 58 MMHG | HEART RATE: 80 BPM | RESPIRATION RATE: 16 BRPM | OXYGEN SATURATION: 100 %

## 2017-05-04 LAB
24R-OH-CALCIDIOL SERPL-MCNC: 22.1 NG/ML — LOW (ref 30–100)
ANION GAP SERPL CALC-SCNC: 4 MMOL/L — LOW (ref 5–17)
BUN SERPL-MCNC: 13 MG/DL — SIGNIFICANT CHANGE UP (ref 7–18)
CALCIUM SERPL-MCNC: 9 MG/DL — SIGNIFICANT CHANGE UP (ref 8.4–10.5)
CHLORIDE SERPL-SCNC: 110 MMOL/L — HIGH (ref 96–108)
CO2 SERPL-SCNC: 30 MMOL/L — SIGNIFICANT CHANGE UP (ref 22–31)
CREAT SERPL-MCNC: 1 MG/DL — SIGNIFICANT CHANGE UP (ref 0.5–1.3)
FERRITIN SERPL-MCNC: 45.8 NG/ML — SIGNIFICANT CHANGE UP (ref 15–150)
FOLATE SERPL-MCNC: 17.2 NG/ML — SIGNIFICANT CHANGE UP (ref 4.8–24.2)
GLUCOSE SERPL-MCNC: 135 MG/DL — HIGH (ref 70–99)
HCT VFR BLD CALC: 37.8 % — SIGNIFICANT CHANGE UP (ref 34.5–45)
HGB BLD-MCNC: 12.1 G/DL — SIGNIFICANT CHANGE UP (ref 11.5–15.5)
IRON SATN MFR SERPL: 18 % — SIGNIFICANT CHANGE UP (ref 15–50)
IRON SATN MFR SERPL: 45 UG/DL — SIGNIFICANT CHANGE UP (ref 40–150)
MAGNESIUM SERPL-MCNC: 1.7 MG/DL — LOW (ref 1.8–2.4)
MCHC RBC-ENTMCNC: 25.6 PG — LOW (ref 27–34)
MCHC RBC-ENTMCNC: 31.9 GM/DL — LOW (ref 32–36)
MCV RBC AUTO: 80.2 FL — SIGNIFICANT CHANGE UP (ref 80–100)
PHOSPHATE SERPL-MCNC: 1.9 MG/DL — LOW (ref 2.5–4.5)
PHOSPHATE SERPL-MCNC: 2.4 MG/DL — LOW (ref 2.5–4.5)
PLATELET # BLD AUTO: 257 K/UL — SIGNIFICANT CHANGE UP (ref 150–400)
POTASSIUM SERPL-MCNC: 3.9 MMOL/L — SIGNIFICANT CHANGE UP (ref 3.5–5.3)
POTASSIUM SERPL-MCNC: 5.6 MMOL/L — HIGH (ref 3.5–5.3)
POTASSIUM SERPL-SCNC: 3.9 MMOL/L — SIGNIFICANT CHANGE UP (ref 3.5–5.3)
POTASSIUM SERPL-SCNC: 5.6 MMOL/L — HIGH (ref 3.5–5.3)
RBC # BLD: 4.71 M/UL — SIGNIFICANT CHANGE UP (ref 3.8–5.2)
RBC # FLD: 16.2 % — HIGH (ref 10.3–14.5)
SODIUM SERPL-SCNC: 144 MMOL/L — SIGNIFICANT CHANGE UP (ref 135–145)
TIBC SERPL-MCNC: 255 UG/DL — SIGNIFICANT CHANGE UP (ref 250–450)
UIBC SERPL-MCNC: 210 UG/DL — SIGNIFICANT CHANGE UP (ref 110–370)
VIT B12 SERPL-MCNC: 709 PG/ML — SIGNIFICANT CHANGE UP (ref 243–894)
WBC # BLD: 5 K/UL — SIGNIFICANT CHANGE UP (ref 3.8–10.5)
WBC # FLD AUTO: 5 K/UL — SIGNIFICANT CHANGE UP (ref 3.8–10.5)

## 2017-05-04 PROCEDURE — 85027 COMPLETE CBC AUTOMATED: CPT

## 2017-05-04 PROCEDURE — 83550 IRON BINDING TEST: CPT

## 2017-05-04 PROCEDURE — 87086 URINE CULTURE/COLONY COUNT: CPT

## 2017-05-04 PROCEDURE — 84550 ASSAY OF BLOOD/URIC ACID: CPT

## 2017-05-04 PROCEDURE — 82728 ASSAY OF FERRITIN: CPT

## 2017-05-04 PROCEDURE — 83930 ASSAY OF BLOOD OSMOLALITY: CPT

## 2017-05-04 PROCEDURE — 97161 PT EVAL LOW COMPLEX 20 MIN: CPT

## 2017-05-04 PROCEDURE — 82570 ASSAY OF URINE CREATININE: CPT

## 2017-05-04 PROCEDURE — 80053 COMPREHEN METABOLIC PANEL: CPT

## 2017-05-04 PROCEDURE — 82436 ASSAY OF URINE CHLORIDE: CPT

## 2017-05-04 PROCEDURE — 76775 US EXAM ABDO BACK WALL LIM: CPT

## 2017-05-04 PROCEDURE — 99238 HOSP IP/OBS DSCHRG MGMT 30/<: CPT

## 2017-05-04 PROCEDURE — 84156 ASSAY OF PROTEIN URINE: CPT

## 2017-05-04 PROCEDURE — 87205 SMEAR GRAM STAIN: CPT

## 2017-05-04 PROCEDURE — 84300 ASSAY OF URINE SODIUM: CPT

## 2017-05-04 PROCEDURE — 84100 ASSAY OF PHOSPHORUS: CPT

## 2017-05-04 PROCEDURE — 83935 ASSAY OF URINE OSMOLALITY: CPT

## 2017-05-04 PROCEDURE — 83735 ASSAY OF MAGNESIUM: CPT

## 2017-05-04 PROCEDURE — 93005 ELECTROCARDIOGRAM TRACING: CPT

## 2017-05-04 PROCEDURE — 81001 URINALYSIS AUTO W/SCOPE: CPT

## 2017-05-04 PROCEDURE — 87040 BLOOD CULTURE FOR BACTERIA: CPT

## 2017-05-04 PROCEDURE — 80048 BASIC METABOLIC PNL TOTAL CA: CPT

## 2017-05-04 PROCEDURE — 83036 HEMOGLOBIN GLYCOSYLATED A1C: CPT

## 2017-05-04 PROCEDURE — 83605 ASSAY OF LACTIC ACID: CPT

## 2017-05-04 PROCEDURE — 99285 EMERGENCY DEPT VISIT HI MDM: CPT | Mod: 25

## 2017-05-04 PROCEDURE — 80061 LIPID PANEL: CPT

## 2017-05-04 PROCEDURE — 84132 ASSAY OF SERUM POTASSIUM: CPT

## 2017-05-04 PROCEDURE — 82607 VITAMIN B-12: CPT

## 2017-05-04 PROCEDURE — 82306 VITAMIN D 25 HYDROXY: CPT

## 2017-05-04 PROCEDURE — 84133 ASSAY OF URINE POTASSIUM: CPT

## 2017-05-04 PROCEDURE — 82746 ASSAY OF FOLIC ACID SERUM: CPT

## 2017-05-04 PROCEDURE — 71045 X-RAY EXAM CHEST 1 VIEW: CPT

## 2017-05-04 RX ORDER — MEMANTINE HYDROCHLORIDE 10 MG/1
1 TABLET ORAL
Qty: 30 | Refills: 0 | OUTPATIENT
Start: 2017-05-04 | End: 2017-06-03

## 2017-05-04 RX ORDER — HALOPERIDOL DECANOATE 100 MG/ML
1 INJECTION INTRAMUSCULAR
Qty: 60 | Refills: 0 | OUTPATIENT
Start: 2017-05-04 | End: 2017-06-03

## 2017-05-04 RX ORDER — MAGNESIUM SULFATE 500 MG/ML
1 VIAL (ML) INJECTION ONCE
Qty: 0 | Refills: 0 | Status: DISCONTINUED | OUTPATIENT
Start: 2017-05-04 | End: 2017-05-04

## 2017-05-04 RX ORDER — CHOLECALCIFEROL (VITAMIN D3) 125 MCG
1000 CAPSULE ORAL DAILY
Qty: 0 | Refills: 0 | Status: DISCONTINUED | OUTPATIENT
Start: 2017-05-04 | End: 2017-05-04

## 2017-05-04 RX ORDER — HALOPERIDOL DECANOATE 100 MG/ML
1 INJECTION INTRAMUSCULAR
Qty: 0 | Refills: 0 | Status: DISCONTINUED | OUTPATIENT
Start: 2017-05-04 | End: 2017-05-04

## 2017-05-04 RX ORDER — MAGNESIUM OXIDE 400 MG ORAL TABLET 241.3 MG
400 TABLET ORAL ONCE
Qty: 0 | Refills: 0 | Status: COMPLETED | OUTPATIENT
Start: 2017-05-04 | End: 2017-05-04

## 2017-05-04 RX ORDER — CHOLECALCIFEROL (VITAMIN D3) 125 MCG
1000 CAPSULE ORAL
Qty: 30000 | Refills: 0
Start: 2017-05-04 | End: 2017-06-03

## 2017-05-04 RX ADMIN — Medication 1 TABLET(S): at 17:37

## 2017-05-04 RX ADMIN — Medication 81 MILLIGRAM(S): at 11:38

## 2017-05-04 RX ADMIN — HALOPERIDOL DECANOATE 0.5 MILLIGRAM(S): 100 INJECTION INTRAMUSCULAR at 06:31

## 2017-05-04 RX ADMIN — Medication 1000 UNIT(S): at 17:37

## 2017-05-04 RX ADMIN — AMLODIPINE BESYLATE 10 MILLIGRAM(S): 2.5 TABLET ORAL at 06:31

## 2017-05-04 RX ADMIN — Medication 1: at 08:54

## 2017-05-04 RX ADMIN — Medication 1: at 11:38

## 2017-05-04 RX ADMIN — HEPARIN SODIUM 5000 UNIT(S): 5000 INJECTION INTRAVENOUS; SUBCUTANEOUS at 06:31

## 2017-05-04 RX ADMIN — HALOPERIDOL DECANOATE 1 MILLIGRAM(S): 100 INJECTION INTRAMUSCULAR at 17:37

## 2017-05-04 RX ADMIN — Medication 25 MILLIGRAM(S): at 06:31

## 2017-05-04 RX ADMIN — LISINOPRIL 20 MILLIGRAM(S): 2.5 TABLET ORAL at 06:31

## 2017-05-04 RX ADMIN — TICAGRELOR 90 MILLIGRAM(S): 90 TABLET ORAL at 06:31

## 2017-05-04 RX ADMIN — MAGNESIUM OXIDE 400 MG ORAL TABLET 400 MILLIGRAM(S): 241.3 TABLET ORAL at 17:37

## 2017-05-04 RX ADMIN — TICAGRELOR 90 MILLIGRAM(S): 90 TABLET ORAL at 17:38

## 2017-05-04 RX ADMIN — Medication 1 TABLET(S): at 11:38

## 2017-05-04 NOTE — DISCHARGE NOTE ADULT - MEDICATION SUMMARY - MEDICATIONS TO TAKE
I will START or STAY ON the medications listed below when I get home from the hospital:    aspirin 81 mg oral tablet  -- 1 tab(s) by mouth once a day  -- Indication: For Pain    lisinopril 10 mg oral tablet  -- 2 tab(s) by mouth once a day  -- Indication: For Essential hypertension, benign    Lantus  -- 24 unit(s) subcutaneous once a day (at bedtime)  -- Indication: For Diabetes    metFORMIN 1000 mg oral tablet  -- 1 tab(s) by mouth 2 times a day  -- Indication: For Diabetes    atorvastatin 20 mg oral tablet  -- 1 tab(s) by mouth once a day  -- Indication: For Dyslipidemia    Brilinta (ticagrelor) 90 mg oral tablet  -- 1 tab(s) by mouth 2 times a day  -- Indication: For Prophylactic measure    haloperidol 1 mg oral tablet  -- 1 tab(s) by mouth 2 times a day  -- Indication: For Dementia    metoprolol succinate 25 mg oral tablet, extended release  -- 1 tab(s) by mouth once a day  -- Indication: For Essential hypertension, benign    amLODIPine 10 mg oral tablet  -- 1 tab(s) by mouth once a day  -- Indication: For Essential hypertension, benign    memantine 5 mg oral tablet  -- 1 tab(s) by mouth once a day (at bedtime)  -- Indication: For Dementia    cholecalciferol oral tablet  -- 1000 unit(s) by mouth once a day  -- Indication: For Health Maintenance

## 2017-05-04 NOTE — DISCHARGE NOTE ADULT - CARE PLAN
Principal Discharge DX:	Failure to thrive in adult  Goal:	Encourage eating and drinking daily  Instructions for follow-up, activity and diet:	Adherence to medications for depression and dementia  Secondary Diagnosis:	Dementia  Goal:	Adherence to daily medication  Secondary Diagnosis:	Depression  Goal:	Adherence to daily medication  Secondary Diagnosis:	History of hypertension  Goal:	Maintain normal blood pressure  Instructions for follow-up, activity and diet:	Adhere to low salt diet

## 2017-05-04 NOTE — DISCHARGE NOTE ADULT - PLAN OF CARE
Encourage eating and drinking daily Adherence to medications for depression and dementia Adherence to daily medication Maintain normal blood pressure Adhere to low salt diet

## 2017-05-04 NOTE — DISCHARGE NOTE ADULT - MEDICATION SUMMARY - MEDICATIONS TO STOP TAKING
I will STOP taking the medications listed below when I get home from the hospital:    mirtazapine 30 mg oral tablet  -- 1 tab(s) by mouth once a day (at bedtime)    risperiDONE 1 mg oral tablet  -- 1 tab(s) by mouth 2 times a day

## 2017-05-04 NOTE — DISCHARGE NOTE ADULT - PROVIDER TOKENS
FREE:[LAST:[[facility]],PHONE:[phone]],FAX:[phone]    -],ADDRESS:[[facility]  [address] [place] [address]]]

## 2017-05-07 LAB
CULTURE RESULTS: SIGNIFICANT CHANGE UP
CULTURE RESULTS: SIGNIFICANT CHANGE UP
SPECIMEN SOURCE: SIGNIFICANT CHANGE UP
SPECIMEN SOURCE: SIGNIFICANT CHANGE UP

## 2017-05-11 DIAGNOSIS — E86.0 DEHYDRATION: ICD-10-CM

## 2017-05-11 DIAGNOSIS — Z79.4 LONG TERM (CURRENT) USE OF INSULIN: ICD-10-CM

## 2017-05-11 DIAGNOSIS — I10 ESSENTIAL (PRIMARY) HYPERTENSION: ICD-10-CM

## 2017-05-11 DIAGNOSIS — H40.9 UNSPECIFIED GLAUCOMA: ICD-10-CM

## 2017-05-11 DIAGNOSIS — F01.50 VASCULAR DEMENTIA WITHOUT BEHAVIORAL DISTURBANCE: ICD-10-CM

## 2017-05-11 DIAGNOSIS — E87.6 HYPOKALEMIA: ICD-10-CM

## 2017-05-11 DIAGNOSIS — R62.7 ADULT FAILURE TO THRIVE: ICD-10-CM

## 2017-05-11 DIAGNOSIS — H54.8 LEGAL BLINDNESS, AS DEFINED IN USA: ICD-10-CM

## 2017-05-11 DIAGNOSIS — E43 UNSPECIFIED SEVERE PROTEIN-CALORIE MALNUTRITION: ICD-10-CM

## 2017-05-11 DIAGNOSIS — D64.9 ANEMIA, UNSPECIFIED: ICD-10-CM

## 2017-05-11 DIAGNOSIS — E11.65 TYPE 2 DIABETES MELLITUS WITH HYPERGLYCEMIA: ICD-10-CM

## 2017-05-11 DIAGNOSIS — F41.9 ANXIETY DISORDER, UNSPECIFIED: ICD-10-CM

## 2017-05-11 DIAGNOSIS — E83.39 OTHER DISORDERS OF PHOSPHORUS METABOLISM: ICD-10-CM

## 2017-05-11 DIAGNOSIS — E78.5 HYPERLIPIDEMIA, UNSPECIFIED: ICD-10-CM

## 2017-05-11 DIAGNOSIS — Z91.14 PATIENT'S OTHER NONCOMPLIANCE WITH MEDICATION REGIMEN: ICD-10-CM

## 2017-05-11 DIAGNOSIS — Z95.5 PRESENCE OF CORONARY ANGIOPLASTY IMPLANT AND GRAFT: ICD-10-CM

## 2017-05-11 DIAGNOSIS — F32.9 MAJOR DEPRESSIVE DISORDER, SINGLE EPISODE, UNSPECIFIED: ICD-10-CM

## 2017-05-11 DIAGNOSIS — Z79.82 LONG TERM (CURRENT) USE OF ASPIRIN: ICD-10-CM

## 2017-05-11 DIAGNOSIS — I25.10 ATHEROSCLEROTIC HEART DISEASE OF NATIVE CORONARY ARTERY WITHOUT ANGINA PECTORIS: ICD-10-CM

## 2017-05-12 DIAGNOSIS — F28 OTHER PSYCHOTIC DISORDER NOT DUE TO A SUBSTANCE OR KNOWN PHYSIOLOGICAL CONDITION: ICD-10-CM

## 2017-05-12 DIAGNOSIS — E83.42 HYPOMAGNESEMIA: ICD-10-CM

## 2017-05-12 DIAGNOSIS — D50.9 IRON DEFICIENCY ANEMIA, UNSPECIFIED: ICD-10-CM

## 2017-05-12 DIAGNOSIS — N17.9 ACUTE KIDNEY FAILURE, UNSPECIFIED: ICD-10-CM

## 2017-05-12 DIAGNOSIS — F03.90 UNSPECIFIED DEMENTIA, UNSPECIFIED SEVERITY, WITHOUT BEHAVIORAL DISTURBANCE, PSYCHOTIC DISTURBANCE, MOOD DISTURBANCE, AND ANXIETY: ICD-10-CM

## 2017-07-17 ENCOUNTER — INPATIENT (INPATIENT)
Facility: HOSPITAL | Age: 72
LOS: 15 days | Discharge: NURSING, REHAB OR LONG-TERM CARE | DRG: 56 | End: 2017-08-02
Attending: HOSPITALIST | Admitting: HOSPITALIST
Payer: COMMERCIAL

## 2017-07-17 VITALS — HEART RATE: 59 BPM | RESPIRATION RATE: 20 BRPM | WEIGHT: 85.1 LBS | OXYGEN SATURATION: 98 % | HEIGHT: 60 IN

## 2017-07-17 DIAGNOSIS — I10 ESSENTIAL (PRIMARY) HYPERTENSION: ICD-10-CM

## 2017-07-17 DIAGNOSIS — E87.8 OTHER DISORDERS OF ELECTROLYTE AND FLUID BALANCE, NOT ELSEWHERE CLASSIFIED: ICD-10-CM

## 2017-07-17 DIAGNOSIS — E11.9 TYPE 2 DIABETES MELLITUS WITHOUT COMPLICATIONS: ICD-10-CM

## 2017-07-17 DIAGNOSIS — Z29.9 ENCOUNTER FOR PROPHYLACTIC MEASURES, UNSPECIFIED: ICD-10-CM

## 2017-07-17 DIAGNOSIS — R62.7 ADULT FAILURE TO THRIVE: ICD-10-CM

## 2017-07-17 DIAGNOSIS — F03.90 UNSPECIFIED DEMENTIA, UNSPECIFIED SEVERITY, WITHOUT BEHAVIORAL DISTURBANCE, PSYCHOTIC DISTURBANCE, MOOD DISTURBANCE, AND ANXIETY: ICD-10-CM

## 2017-07-17 DIAGNOSIS — Z65.9 PROBLEM RELATED TO UNSPECIFIED PSYCHOSOCIAL CIRCUMSTANCES: ICD-10-CM

## 2017-07-17 PROBLEM — Z86.79 PERSONAL HISTORY OF OTHER DISEASES OF THE CIRCULATORY SYSTEM: Chronic | Status: ACTIVE | Noted: 2017-05-01

## 2017-07-17 PROBLEM — E78.5 HYPERLIPIDEMIA, UNSPECIFIED: Chronic | Status: ACTIVE | Noted: 2017-05-01

## 2017-07-17 LAB
ALBUMIN SERPL ELPH-MCNC: 2.6 G/DL — LOW (ref 3.5–5)
ALBUMIN SERPL ELPH-MCNC: 2.7 G/DL — LOW (ref 3.5–5)
ALP SERPL-CCNC: 86 U/L — SIGNIFICANT CHANGE UP (ref 40–120)
ALP SERPL-CCNC: 87 U/L — SIGNIFICANT CHANGE UP (ref 40–120)
ALT FLD-CCNC: 11 U/L DA — SIGNIFICANT CHANGE UP (ref 10–60)
ALT FLD-CCNC: 12 U/L DA — SIGNIFICANT CHANGE UP (ref 10–60)
ANION GAP SERPL CALC-SCNC: 10 MMOL/L — SIGNIFICANT CHANGE UP (ref 5–17)
ANION GAP SERPL CALC-SCNC: 9 MMOL/L — SIGNIFICANT CHANGE UP (ref 5–17)
AST SERPL-CCNC: 13 U/L — SIGNIFICANT CHANGE UP (ref 10–40)
AST SERPL-CCNC: 8 U/L — LOW (ref 10–40)
BASOPHILS # BLD AUTO: 0.1 K/UL — SIGNIFICANT CHANGE UP (ref 0–0.2)
BASOPHILS # BLD AUTO: 0.1 K/UL — SIGNIFICANT CHANGE UP (ref 0–0.2)
BASOPHILS NFR BLD AUTO: 1.1 % — SIGNIFICANT CHANGE UP (ref 0–2)
BASOPHILS NFR BLD AUTO: 1.3 % — SIGNIFICANT CHANGE UP (ref 0–2)
BILIRUB SERPL-MCNC: 0.3 MG/DL — SIGNIFICANT CHANGE UP (ref 0.2–1.2)
BILIRUB SERPL-MCNC: 0.4 MG/DL — SIGNIFICANT CHANGE UP (ref 0.2–1.2)
BUN SERPL-MCNC: 9 MG/DL — SIGNIFICANT CHANGE UP (ref 7–18)
BUN SERPL-MCNC: 9 MG/DL — SIGNIFICANT CHANGE UP (ref 7–18)
CALCIUM SERPL-MCNC: 8.4 MG/DL — SIGNIFICANT CHANGE UP (ref 8.4–10.5)
CALCIUM SERPL-MCNC: 8.5 MG/DL — SIGNIFICANT CHANGE UP (ref 8.4–10.5)
CHLORIDE SERPL-SCNC: 98 MMOL/L — SIGNIFICANT CHANGE UP (ref 96–108)
CHLORIDE SERPL-SCNC: 99 MMOL/L — SIGNIFICANT CHANGE UP (ref 96–108)
CHOLEST SERPL-MCNC: 228 MG/DL — HIGH (ref 10–199)
CO2 SERPL-SCNC: 29 MMOL/L — SIGNIFICANT CHANGE UP (ref 22–31)
CO2 SERPL-SCNC: 29 MMOL/L — SIGNIFICANT CHANGE UP (ref 22–31)
CREAT SERPL-MCNC: 0.96 MG/DL — SIGNIFICANT CHANGE UP (ref 0.5–1.3)
CREAT SERPL-MCNC: 0.99 MG/DL — SIGNIFICANT CHANGE UP (ref 0.5–1.3)
EOSINOPHIL # BLD AUTO: 0.1 K/UL — SIGNIFICANT CHANGE UP (ref 0–0.5)
EOSINOPHIL # BLD AUTO: 0.1 K/UL — SIGNIFICANT CHANGE UP (ref 0–0.5)
EOSINOPHIL NFR BLD AUTO: 1.3 % — SIGNIFICANT CHANGE UP (ref 0–6)
EOSINOPHIL NFR BLD AUTO: 1.8 % — SIGNIFICANT CHANGE UP (ref 0–6)
GLUCOSE SERPL-MCNC: 331 MG/DL — HIGH (ref 70–99)
GLUCOSE SERPL-MCNC: 358 MG/DL — HIGH (ref 70–99)
HCT VFR BLD CALC: 36.7 % — SIGNIFICANT CHANGE UP (ref 34.5–45)
HCT VFR BLD CALC: 38 % — SIGNIFICANT CHANGE UP (ref 34.5–45)
HDLC SERPL-MCNC: 43 MG/DL — SIGNIFICANT CHANGE UP (ref 40–125)
HGB BLD-MCNC: 12.4 G/DL — SIGNIFICANT CHANGE UP (ref 11.5–15.5)
HGB BLD-MCNC: 12.7 G/DL — SIGNIFICANT CHANGE UP (ref 11.5–15.5)
IRON SATN MFR SERPL: 16 % — SIGNIFICANT CHANGE UP (ref 15–50)
IRON SATN MFR SERPL: 36 UG/DL — LOW (ref 40–150)
LDH SERPL L TO P-CCNC: 160 U/L — SIGNIFICANT CHANGE UP (ref 120–225)
LIPID PNL WITH DIRECT LDL SERPL: 131 MG/DL — SIGNIFICANT CHANGE UP
LYMPHOCYTES # BLD AUTO: 1.8 K/UL — SIGNIFICANT CHANGE UP (ref 1–3.3)
LYMPHOCYTES # BLD AUTO: 2 K/UL — SIGNIFICANT CHANGE UP (ref 1–3.3)
LYMPHOCYTES # BLD AUTO: 26.9 % — SIGNIFICANT CHANGE UP (ref 13–44)
LYMPHOCYTES # BLD AUTO: 28.2 % — SIGNIFICANT CHANGE UP (ref 13–44)
MAGNESIUM SERPL-MCNC: 1.9 MG/DL — SIGNIFICANT CHANGE UP (ref 1.6–2.6)
MCHC RBC-ENTMCNC: 28.3 PG — SIGNIFICANT CHANGE UP (ref 27–34)
MCHC RBC-ENTMCNC: 28.6 PG — SIGNIFICANT CHANGE UP (ref 27–34)
MCHC RBC-ENTMCNC: 33.5 GM/DL — SIGNIFICANT CHANGE UP (ref 32–36)
MCHC RBC-ENTMCNC: 33.8 GM/DL — SIGNIFICANT CHANGE UP (ref 32–36)
MCV RBC AUTO: 84.6 FL — SIGNIFICANT CHANGE UP (ref 80–100)
MCV RBC AUTO: 84.7 FL — SIGNIFICANT CHANGE UP (ref 80–100)
MONOCYTES # BLD AUTO: 0.4 K/UL — SIGNIFICANT CHANGE UP (ref 0–0.9)
MONOCYTES # BLD AUTO: 0.5 K/UL — SIGNIFICANT CHANGE UP (ref 0–0.9)
MONOCYTES NFR BLD AUTO: 5.8 % — SIGNIFICANT CHANGE UP (ref 2–14)
MONOCYTES NFR BLD AUTO: 6.6 % — SIGNIFICANT CHANGE UP (ref 2–14)
NEUTROPHILS # BLD AUTO: 4.3 K/UL — SIGNIFICANT CHANGE UP (ref 1.8–7.4)
NEUTROPHILS # BLD AUTO: 4.3 K/UL — SIGNIFICANT CHANGE UP (ref 1.8–7.4)
NEUTROPHILS NFR BLD AUTO: 62.3 % — SIGNIFICANT CHANGE UP (ref 43–77)
NEUTROPHILS NFR BLD AUTO: 64.8 % — SIGNIFICANT CHANGE UP (ref 43–77)
PHOSPHATE SERPL-MCNC: 2.1 MG/DL — LOW (ref 2.5–4.5)
PLATELET # BLD AUTO: 335 K/UL — SIGNIFICANT CHANGE UP (ref 150–400)
PLATELET # BLD AUTO: 343 K/UL — SIGNIFICANT CHANGE UP (ref 150–400)
POTASSIUM SERPL-MCNC: 2.5 MMOL/L — CRITICAL LOW (ref 3.5–5.3)
POTASSIUM SERPL-MCNC: 2.6 MMOL/L — CRITICAL LOW (ref 3.5–5.3)
POTASSIUM SERPL-SCNC: 2.5 MMOL/L — CRITICAL LOW (ref 3.5–5.3)
POTASSIUM SERPL-SCNC: 2.6 MMOL/L — CRITICAL LOW (ref 3.5–5.3)
PROT SERPL-MCNC: 6 G/DL — SIGNIFICANT CHANGE UP (ref 6–8.3)
PROT SERPL-MCNC: 6 G/DL — SIGNIFICANT CHANGE UP (ref 6–8.3)
RBC # BLD: 4.33 M/UL — SIGNIFICANT CHANGE UP (ref 3.8–5.2)
RBC # BLD: 4.49 M/UL — SIGNIFICANT CHANGE UP (ref 3.8–5.2)
RBC # FLD: 13.2 % — SIGNIFICANT CHANGE UP (ref 10.3–14.5)
RBC # FLD: 13.3 % — SIGNIFICANT CHANGE UP (ref 10.3–14.5)
SODIUM SERPL-SCNC: 137 MMOL/L — SIGNIFICANT CHANGE UP (ref 135–145)
SODIUM SERPL-SCNC: 137 MMOL/L — SIGNIFICANT CHANGE UP (ref 135–145)
TIBC SERPL-MCNC: 232 UG/DL — LOW (ref 250–450)
TOTAL CHOLESTEROL/HDL RATIO MEASUREMENT: 5.3 RATIO — SIGNIFICANT CHANGE UP (ref 3.3–7.1)
TRIGL SERPL-MCNC: 269 MG/DL — HIGH (ref 10–149)
UIBC SERPL-MCNC: 196 UG/DL — SIGNIFICANT CHANGE UP (ref 110–370)
WBC # BLD: 6.7 K/UL — SIGNIFICANT CHANGE UP (ref 3.8–10.5)
WBC # BLD: 6.9 K/UL — SIGNIFICANT CHANGE UP (ref 3.8–10.5)
WBC # FLD AUTO: 6.7 K/UL — SIGNIFICANT CHANGE UP (ref 3.8–10.5)
WBC # FLD AUTO: 6.9 K/UL — SIGNIFICANT CHANGE UP (ref 3.8–10.5)

## 2017-07-17 PROCEDURE — 99285 EMERGENCY DEPT VISIT HI MDM: CPT

## 2017-07-17 RX ORDER — AMLODIPINE BESYLATE 2.5 MG/1
10 TABLET ORAL DAILY
Qty: 0 | Refills: 0 | Status: DISCONTINUED | OUTPATIENT
Start: 2017-07-17 | End: 2017-08-02

## 2017-07-17 RX ORDER — POTASSIUM CHLORIDE 20 MEQ
10 PACKET (EA) ORAL
Qty: 0 | Refills: 0 | Status: COMPLETED | OUTPATIENT
Start: 2017-07-17 | End: 2017-07-18

## 2017-07-17 RX ORDER — CHOLECALCIFEROL (VITAMIN D3) 125 MCG
1000 CAPSULE ORAL DAILY
Qty: 0 | Refills: 0 | Status: DISCONTINUED | OUTPATIENT
Start: 2017-07-17 | End: 2017-08-02

## 2017-07-17 RX ORDER — METFORMIN HYDROCHLORIDE 850 MG/1
1000 TABLET ORAL
Qty: 0 | Refills: 0 | Status: DISCONTINUED | OUTPATIENT
Start: 2017-07-17 | End: 2017-07-18

## 2017-07-17 RX ORDER — INSULIN GLARGINE 100 [IU]/ML
24 INJECTION, SOLUTION SUBCUTANEOUS AT BEDTIME
Qty: 0 | Refills: 0 | Status: DISCONTINUED | OUTPATIENT
Start: 2017-07-17 | End: 2017-07-22

## 2017-07-17 RX ORDER — METOPROLOL TARTRATE 50 MG
25 TABLET ORAL DAILY
Qty: 0 | Refills: 0 | Status: DISCONTINUED | OUTPATIENT
Start: 2017-07-17 | End: 2017-08-02

## 2017-07-17 RX ORDER — HALOPERIDOL DECANOATE 100 MG/ML
5 INJECTION INTRAMUSCULAR ONCE
Qty: 0 | Refills: 0 | Status: COMPLETED | OUTPATIENT
Start: 2017-07-17 | End: 2017-07-17

## 2017-07-17 RX ORDER — ASPIRIN/CALCIUM CARB/MAGNESIUM 324 MG
81 TABLET ORAL DAILY
Qty: 0 | Refills: 0 | Status: DISCONTINUED | OUTPATIENT
Start: 2017-07-17 | End: 2017-08-02

## 2017-07-17 RX ORDER — TICAGRELOR 90 MG/1
90 TABLET ORAL
Qty: 0 | Refills: 0 | Status: DISCONTINUED | OUTPATIENT
Start: 2017-07-17 | End: 2017-08-02

## 2017-07-17 RX ORDER — LISINOPRIL 2.5 MG/1
20 TABLET ORAL DAILY
Qty: 0 | Refills: 0 | Status: DISCONTINUED | OUTPATIENT
Start: 2017-07-17 | End: 2017-08-02

## 2017-07-17 RX ORDER — MEMANTINE HYDROCHLORIDE 10 MG/1
5 TABLET ORAL AT BEDTIME
Qty: 0 | Refills: 0 | Status: DISCONTINUED | OUTPATIENT
Start: 2017-07-17 | End: 2017-07-31

## 2017-07-17 RX ORDER — ATORVASTATIN CALCIUM 80 MG/1
20 TABLET, FILM COATED ORAL AT BEDTIME
Qty: 0 | Refills: 0 | Status: DISCONTINUED | OUTPATIENT
Start: 2017-07-17 | End: 2017-08-02

## 2017-07-17 RX ORDER — ENOXAPARIN SODIUM 100 MG/ML
40 INJECTION SUBCUTANEOUS DAILY
Qty: 0 | Refills: 0 | Status: DISCONTINUED | OUTPATIENT
Start: 2017-07-17 | End: 2017-08-02

## 2017-07-17 RX ORDER — INSULIN LISPRO 100/ML
VIAL (ML) SUBCUTANEOUS
Qty: 0 | Refills: 0 | Status: DISCONTINUED | OUTPATIENT
Start: 2017-07-17 | End: 2017-08-02

## 2017-07-17 RX ORDER — POTASSIUM CHLORIDE 20 MEQ
10 PACKET (EA) ORAL
Qty: 0 | Refills: 0 | Status: DISCONTINUED | OUTPATIENT
Start: 2017-07-17 | End: 2017-07-18

## 2017-07-17 RX ORDER — HALOPERIDOL DECANOATE 100 MG/ML
1 INJECTION INTRAMUSCULAR EVERY 12 HOURS
Qty: 0 | Refills: 0 | Status: DISCONTINUED | OUTPATIENT
Start: 2017-07-17 | End: 2017-07-23

## 2017-07-17 RX ADMIN — HALOPERIDOL DECANOATE 5 MILLIGRAM(S): 100 INJECTION INTRAMUSCULAR at 20:53

## 2017-07-17 RX ADMIN — Medication 100 MILLIEQUIVALENT(S): at 22:21

## 2017-07-17 RX ADMIN — Medication 100 MILLIEQUIVALENT(S): at 23:28

## 2017-07-17 NOTE — H&P ADULT - ATTENDING COMMENTS
Patient seen and examined at bedside, looks ok, was refusing examination in beginning but eventually cooperated.  Could not obtain any history from her and history obtained based on chart review.  Physical exam: patient is AAOx0, looks dehydrated, cachectic and malnourished.   A/P: Dementia in adult: continue with Namenda and add Remeron   psych input appreciated  UTI: continue with Rocephin and f.u urine culture.  hypokalemia: secondary to poor oral intake and FTT, family refusing any Nutrition tube placement.  Severe protein calorie malnutrition: nutrition input appreciated, nutritional supplement  social work placement: family wishing for Long Term placement.

## 2017-07-17 NOTE — H&P ADULT - NSHPREVIEWOFSYSTEMS_GEN_ALL_CORE
REVIEW OF SYSTEMS:  Patient was lying comfortably in bed. No acute distress.   No fever,   No cough, SOB  No chest pain, palpitations  No Abd pain, nausea, vomiting, No diarrhea or constipation

## 2017-07-17 NOTE — ED ADULT NURSE NOTE - OBJECTIVE STATEMENT
73 y/o F pt w/ PMHx of dementia, HTN, DM and dyslipidemia presents to the ED c/o noncompliance w/ meds and increasing confusion. Pt was admitted in early May for failure to thrive secondary to dementia and depression. The psychiatrist saw her and changed her from Risperdal to Haldol. Pt also on memantine for dementia, Metformin for DM. Daughter states that pt has been falling frequently and is not eating. Daughter wants to put pt in an Adult Care Facility. Denies fever, chills, urinary symptoms, vomiting, nausea or any other complaints. NK

## 2017-07-17 NOTE — H&P ADULT - PROBLEM SELECTOR PLAN 6
Lovenox sub cutaneous as Improved score is 2. Continue home dose of Lantus, metformin and Accu-Cheks.

## 2017-07-17 NOTE — ED PROVIDER NOTE - OBJECTIVE STATEMENT
73 y/o F pt w/ PMHx of dementia, HTN, DM and dyslipidemia presents to the ED c/o noncompliance w/ meds and increasing confusion. Pt was admitted in early May for failure to thrive secondary to dementia and depression. The psychiatrist saw her and changed her from Risperdal to Haldol. Pt also on memantine for dementia, Metformin for DM. Daughter states that pt has been falling frequently and is not eating. Daughter wants to put pt in an Adult Care Facility. Denies fever, chills, urinary symptoms, vomiting, nausea or any other complaints. NKDA.

## 2017-07-17 NOTE — H&P ADULT - NSHPLABSRESULTS_GEN_ALL_CORE
LABS:                        12.7   6.9   )-----------( 343      ( 17 Jul 2017 22:47 )             38.0     07-17    137  |  98  |  9   ----------------------------<  358<H>  2.5<LL>   |  29  |  0.96    Ca    8.4      17 Jul 2017 21:31    TPro  6.0  /  Alb  2.6<L>  /  TBili  0.3  /  DBili  x   /  AST  8<L>  /  ALT  11  /  AlkPhos  86  07-17

## 2017-07-17 NOTE — H&P ADULT - HISTORY OF PRESENT ILLNESS
72 F from home, lives with daughter, with HHA 5hours per day 5 days a week PMH of  Dementia, Depression, DM, HTN, HLD, CAD x3 stents last stent in Aug 2016, Glaucoma, legally blind, brought in by Daughter  with worsening dementia, weakness and not eating and drinking sufficiently since May/2017. Pt is sedated due to agitation and poor co-operation currently, not oriented. As per daughter pt is refusing to take any of her meds since 2 months  as she is not eating or drinking much, Patient recent admission in May with the same compliant and this time daughter was asking for long term care placement. Daughter stated that patient has been mostly bedbound for last 6 months as she keep having recurrent falls and unsteady gait. She occasionally went into kitchen but stays in bed most of the time. 72 F from home, lives with daughter, with HHA 5hours per day 5 days a week PMH of  Dementia, Depression, DM, HTN, HLD, CAD x3 stents last stent in Aug 2016, Glaucoma, legally blind, brought in by Daughter  with worsening dementia, weakness and not eating and drinking sufficiently since May/2017. Pt is sedated due to agitation and poor co-operation currently, not oriented. As per daughter pt is refusing to take any of her meds since 2 months  as she is not eating or drinking much, Patient recent admission in May with the same compliant and this time daughter was asking for long term care placement. Daughter stated that patient has been mostly bedbound for last 6 months as she keep having recurrent falls and unsteady gait. She occasionally went into kitchen but stays in bed most of the time. Daughter stated she does not want PEG tube or any other kind of artificial nutrition. Patient was DNR/DNI on previous admission and daughter wanted to wait until morning until her sister came in to sign the form.   In ED, vitals were stable and pt is hypokalemic to 2.5 without no acute EKG changes and NO U wave. Refusing medication. Patient was sedated and potassium riders were given.

## 2017-07-17 NOTE — ED PROVIDER NOTE - MEDICAL DECISION MAKING DETAILS
Patient with dementia and DM, worsening agitation at home, family requesting admission for long term care placement, patient so agitated requiring chemical calming agents, labs show hypokalemia, will start repletion, admit to tele, ekg and AMS workup.

## 2017-07-17 NOTE — H&P ADULT - PROBLEM SELECTOR PLAN 3
Family wants long term placement.   Will sign DNR/DNI in AM as per family. f/u in AM.  Goal of care discussion with no artificial tube feeding. nitrofurantoin for 5 days due to UA positive. No WBC, No fever.   Asymptomatic but unreliable compliant as pt is demented.   f/u UCx Rocephin due to UA positive. No WBC, No fever.   Asymptomatic but unreliable compliant as pt is demented.   f/u UCx

## 2017-07-17 NOTE — H&P ADULT - PROBLEM SELECTOR PLAN 1
Decreased PO intake for >2 months with unknown amount of weight loss as per daughter. 2/2 dementia and psych issue. Psych consult Dr Nina. Continue home psych medication.   NO PEG tube or no artificial nutrition like NG or OG tube as per daughter.   IVF and BMP monitor for electrolyte imbalance.

## 2017-07-17 NOTE — H&P ADULT - PROBLEM SELECTOR PLAN 4
Continue home medication Family wants long term placement.   Will sign DNR/DNI in AM as per family. f/u in AM.  Goal of care discussion with no artificial tube feeding.

## 2017-07-17 NOTE — ED ADULT TRIAGE NOTE - CHIEF COMPLAINT QUOTE
per daughters, patient has been noncompliant with medication, decreased appetite, weakness, increased confusion, patient not sitting still moving constantly, unable take bp and temperature. patent pulled out he thermometer.

## 2017-07-17 NOTE — H&P ADULT - ASSESSMENT
72 F from home, lives with daughter, with HHA 5hours per day 5 days a week PMH of  Dementia, Depression, DM, HTN, HLD, CAD x3 stents last stent in Aug 2016, Glaucoma, legally blind, brought in by Daughter  with worsening dementia, weakness and not eating and drinking sufficiently since May/2017.

## 2017-07-17 NOTE — H&P ADULT - NSHPPHYSICALEXAM_GEN_ALL_CORE
PHYSICAL EXAM:    Neuro: AAO  can not be assessed as pt is sedated and pt is severely demented as per daughter.   EYES: EOMI, PERRLA,  NECK: Supple, No JVD, Normal thyroid  Resp: CTAB, No crackles, wheezing,   CVS: Regular rate and rhythm; No murmurs, rubs, or gallops  ABD: Soft, Nontender, Nondistended; Bowel sounds present  EXTREMITIES:  2+ Peripheral Pulses, No edema

## 2017-07-18 DIAGNOSIS — G30.8 OTHER ALZHEIMER'S DISEASE: ICD-10-CM

## 2017-07-18 DIAGNOSIS — N39.0 URINARY TRACT INFECTION, SITE NOT SPECIFIED: ICD-10-CM

## 2017-07-18 LAB
ANION GAP SERPL CALC-SCNC: 13 MMOL/L — SIGNIFICANT CHANGE UP (ref 5–17)
APPEARANCE UR: CLEAR — SIGNIFICANT CHANGE UP
BILIRUB UR-MCNC: NEGATIVE — SIGNIFICANT CHANGE UP
BUN SERPL-MCNC: 7 MG/DL — SIGNIFICANT CHANGE UP (ref 7–18)
CALCIUM SERPL-MCNC: 8.3 MG/DL — LOW (ref 8.4–10.5)
CHLORIDE SERPL-SCNC: 104 MMOL/L — SIGNIFICANT CHANGE UP (ref 96–108)
CO2 SERPL-SCNC: 24 MMOL/L — SIGNIFICANT CHANGE UP (ref 22–31)
COLOR SPEC: YELLOW — SIGNIFICANT CHANGE UP
CREAT SERPL-MCNC: 0.71 MG/DL — SIGNIFICANT CHANGE UP (ref 0.5–1.3)
DIFF PNL FLD: ABNORMAL
FOLATE SERPL-MCNC: 11.4 NG/ML — SIGNIFICANT CHANGE UP (ref 4.8–24.2)
GLUCOSE SERPL-MCNC: 212 MG/DL — HIGH (ref 70–99)
GLUCOSE UR QL: 1000 MG/DL
HBA1C BLD-MCNC: 11.4 % — HIGH (ref 4–5.6)
KETONES UR-MCNC: NEGATIVE — SIGNIFICANT CHANGE UP
LEUKOCYTE ESTERASE UR-ACNC: ABNORMAL
MAGNESIUM SERPL-MCNC: 1.8 MG/DL — SIGNIFICANT CHANGE UP (ref 1.6–2.6)
NITRITE UR-MCNC: NEGATIVE — SIGNIFICANT CHANGE UP
PH UR: 6 — SIGNIFICANT CHANGE UP (ref 5–8)
PHOSPHATE SERPL-MCNC: 3.1 MG/DL — SIGNIFICANT CHANGE UP (ref 2.5–4.5)
POTASSIUM SERPL-MCNC: 3.7 MMOL/L — SIGNIFICANT CHANGE UP (ref 3.5–5.3)
POTASSIUM SERPL-SCNC: 3.7 MMOL/L — SIGNIFICANT CHANGE UP (ref 3.5–5.3)
PROT UR-MCNC: 30 MG/DL
RBC # BLD: 4.48 M/UL — SIGNIFICANT CHANGE UP (ref 3.8–5.2)
RETICS #: 77.1 K/UL — SIGNIFICANT CHANGE UP (ref 25–125)
RETICS/RBC NFR: 1.7 % — SIGNIFICANT CHANGE UP (ref 0.5–2.5)
SODIUM SERPL-SCNC: 141 MMOL/L — SIGNIFICANT CHANGE UP (ref 135–145)
SP GR SPEC: 1.01 — SIGNIFICANT CHANGE UP (ref 1.01–1.02)
UROBILINOGEN FLD QL: NEGATIVE — SIGNIFICANT CHANGE UP
VIT B12 SERPL-MCNC: 456 PG/ML — SIGNIFICANT CHANGE UP (ref 243–894)

## 2017-07-18 PROCEDURE — 71010: CPT | Mod: 26

## 2017-07-18 PROCEDURE — 99223 1ST HOSP IP/OBS HIGH 75: CPT | Mod: GC

## 2017-07-18 RX ORDER — NITROFURANTOIN MACROCRYSTAL 50 MG
100 CAPSULE ORAL
Qty: 0 | Refills: 0 | Status: DISCONTINUED | OUTPATIENT
Start: 2017-07-18 | End: 2017-07-18

## 2017-07-18 RX ORDER — CEFTRIAXONE 500 MG/1
1 INJECTION, POWDER, FOR SOLUTION INTRAMUSCULAR; INTRAVENOUS ONCE
Qty: 0 | Refills: 0 | Status: COMPLETED | OUTPATIENT
Start: 2017-07-18 | End: 2017-07-18

## 2017-07-18 RX ORDER — INSULIN GLARGINE 100 [IU]/ML
12 INJECTION, SOLUTION SUBCUTANEOUS AT BEDTIME
Qty: 0 | Refills: 0 | Status: DISCONTINUED | OUTPATIENT
Start: 2017-07-18 | End: 2017-07-19

## 2017-07-18 RX ORDER — CEFTRIAXONE 500 MG/1
INJECTION, POWDER, FOR SOLUTION INTRAMUSCULAR; INTRAVENOUS
Qty: 0 | Refills: 0 | Status: DISCONTINUED | OUTPATIENT
Start: 2017-07-18 | End: 2017-07-23

## 2017-07-18 RX ORDER — MIRTAZAPINE 45 MG/1
7.5 TABLET, ORALLY DISINTEGRATING ORAL THREE TIMES A DAY
Qty: 0 | Refills: 0 | Status: COMPLETED | OUTPATIENT
Start: 2017-07-18 | End: 2017-07-25

## 2017-07-18 RX ORDER — POTASSIUM PHOSPHATE, MONOBASIC POTASSIUM PHOSPHATE, DIBASIC 236; 224 MG/ML; MG/ML
15 INJECTION, SOLUTION INTRAVENOUS ONCE
Qty: 0 | Refills: 0 | Status: COMPLETED | OUTPATIENT
Start: 2017-07-18 | End: 2017-07-18

## 2017-07-18 RX ORDER — POTASSIUM CHLORIDE 20 MEQ
10 PACKET (EA) ORAL
Qty: 0 | Refills: 0 | Status: COMPLETED | OUTPATIENT
Start: 2017-07-18 | End: 2017-07-18

## 2017-07-18 RX ORDER — CEFTRIAXONE 500 MG/1
1 INJECTION, POWDER, FOR SOLUTION INTRAMUSCULAR; INTRAVENOUS EVERY 24 HOURS
Qty: 0 | Refills: 0 | Status: DISCONTINUED | OUTPATIENT
Start: 2017-07-19 | End: 2017-07-23

## 2017-07-18 RX ADMIN — AMLODIPINE BESYLATE 10 MILLIGRAM(S): 2.5 TABLET ORAL at 05:11

## 2017-07-18 RX ADMIN — Medication 3: at 11:51

## 2017-07-18 RX ADMIN — CEFTRIAXONE 100 GRAM(S): 500 INJECTION, POWDER, FOR SOLUTION INTRAMUSCULAR; INTRAVENOUS at 08:25

## 2017-07-18 RX ADMIN — Medication 100 MILLIEQUIVALENT(S): at 05:41

## 2017-07-18 RX ADMIN — Medication 25 MILLIGRAM(S): at 05:11

## 2017-07-18 RX ADMIN — Medication 1000 UNIT(S): at 11:51

## 2017-07-18 RX ADMIN — TICAGRELOR 90 MILLIGRAM(S): 90 TABLET ORAL at 05:11

## 2017-07-18 RX ADMIN — Medication 2: at 08:25

## 2017-07-18 RX ADMIN — Medication 1: at 16:34

## 2017-07-18 RX ADMIN — Medication 81 MILLIGRAM(S): at 11:51

## 2017-07-18 RX ADMIN — Medication 100 MILLIEQUIVALENT(S): at 04:39

## 2017-07-18 RX ADMIN — POTASSIUM PHOSPHATE, MONOBASIC POTASSIUM PHOSPHATE, DIBASIC 62.5 MILLIMOLE(S): 236; 224 INJECTION, SOLUTION INTRAVENOUS at 04:38

## 2017-07-18 RX ADMIN — ENOXAPARIN SODIUM 40 MILLIGRAM(S): 100 INJECTION SUBCUTANEOUS at 11:51

## 2017-07-18 RX ADMIN — TICAGRELOR 90 MILLIGRAM(S): 90 TABLET ORAL at 18:07

## 2017-07-18 RX ADMIN — LISINOPRIL 20 MILLIGRAM(S): 2.5 TABLET ORAL at 05:11

## 2017-07-18 RX ADMIN — Medication 100 MILLIEQUIVALENT(S): at 00:33

## 2017-07-18 RX ADMIN — Medication 100 MILLIEQUIVALENT(S): at 06:44

## 2017-07-18 NOTE — DIETITIAN INITIAL EVALUATION ADULT. - NUTRITION INTERVENTION
Collaboration and Referral of Nutrition Care/Feeding Assistance/Medical Food Supplements/Meals and Snack/Discharge and Transfer of Nutrition Care to New Setting

## 2017-07-18 NOTE — PROGRESS NOTE ADULT - PROBLEM SELECTOR PLAN 2
Hypokalemia-replaced. No EKG changes. NO U wave.   - Currently his K is normalized from 2.6 to 3.7  - D/c tele monitoring for no changes in EKG secondary to hypokalemia

## 2017-07-18 NOTE — PROGRESS NOTE ADULT - PROBLEM SELECTOR PLAN 6
Continue home dose of Lantus and Accu-Cheks.  - Metformin is discontinued for the low input and threat of hypoglycemia

## 2017-07-18 NOTE — DIETITIAN INITIAL EVALUATION ADULT. - MD RECOMMEND
change diet to soft Consistent CHO diet with Add Glucertanya Cabrerake 1can bid as medically feasible (440kcal, 20g protein)

## 2017-07-18 NOTE — DIETITIAN INITIAL EVALUATION ADULT. - NS AS NUTRI INTERV DISCHARGE
dietitian at skilled nursing facility when medically ready to be discharged/Discharge and transfer to another nutrition professional

## 2017-07-18 NOTE — DIETITIAN INITIAL EVALUATION ADULT. - FACTORS AFF FOOD INTAKE
change in mental status/difficulty with food procurement/preparation/Anab[employer]ism/ethnic/cultural/personal food preferences/difficulty feeding self/persistent lack of appetite/legally blind

## 2017-07-18 NOTE — DIETITIAN INITIAL EVALUATION ADULT. - OTHER INFO
nutrition consult requested for Failure to Thrive; lives home with HHA help; skin intact; bedbound; not eating or drinking much, not taking medication x 2m; no PEG/artifical nutrition, and for long term care per family according to MD; consumed 80% of potato, 50% egg at breakfast today per RN; nutrition consult requested for Failure to Thrive; lives home with HHA help; skin intact; bedbound; not eating or drinking much, not taking medication x 2m; no PEG/artifical nutrition, and for long term care per family according to MD; consumed 80% of potato, 50% egg at breakfast today per RN, no swallowing problem reported; wt data from [place] EMR reivewed: 105.8 lb 12/19/16-->84 lb 7/17/17, wt loss of 21.8 lb (20.6%) x 7m nutrition consult requested for Failure to Thrive; lives home with HHA help; skin intact; bedbound; not eating or drinking much, not taking medication x 2m; no PEG/artifical nutrition, and for long term care per family according to MD; consumed 80% of potato, 50% egg at breakfast today per RN, no swallowing problem reported; wt data from [place] EMR reivewed: 105.8 lb 12/19/16-->84 lb 7/17/17, wt loss of 21.8 lb (20.6%) x 7m; s/p Psychiatric consult

## 2017-07-18 NOTE — BEHAVIORAL HEALTH ASSESSMENT NOTE - SUMMARY
This is a 71 y/o female with PMH of Dementia on Namenda was admitted with poor PO intake with periods of agiatation.  Patient was interviewed,chart was reviewed.Case was discussed with MD.  Patient is unable to provide her PMH or the reason for current hospitalisation.  She stated: " I am right here".  Patient is awake,thin,petite,minimally verbal,sad looking with limited eye contact,behaviorally unpredictable.  She is A/O x1.Speech is low pitched,with long pauses,goal directed,illogical at times.Denied a/v hallucinations,denied s/h thought.memory and cognition-limited.I&J-limited.Poor night sleep.

## 2017-07-18 NOTE — PROGRESS NOTE ADULT - ASSESSMENT
72 F from home, lives with daughter, with HHA 5hours per day 5 days a week PMH of  Dementia, Depression, DM, HTN, HLD, CAD x3 stents last stent in Aug 2016, Glaucoma, legally blind, brought in by Daughter  with worsening dementia, weakness and not eating and drinking sufficiently since May/2017. Being replenished for hypokalemia and hypophosphatemia. no EKG changes noted and tele is discontinued as per Dr. Brittany Stein. 72 F from home, lives with daughter, with HHA 5hours per day 5 days a week PMH of  Dementia, Depression, DM, HTN, HLD, CAD x3 stents last stent in Aug 2016, Glaucoma, legally blind, brought in by Daughter  with worsening dementia, weakness and not eating and drinking sufficiently since May/2017. Being replenished for hypokalemia and hypophosphatemia. no EKG changes noted and tele is discontinued as per Dr. Brittany Stein. Physical therapy and Social work is consulted for the patient to go for hospice and short term placement

## 2017-07-18 NOTE — PROGRESS NOTE ADULT - PROBLEM SELECTOR PLAN 1
Decreased PO intake for >2 months with unknown amount of weight loss as per daughter. 2/2 dementia and psych issue. Per Dr Nina remeron 7.5 mg is started  - NO PEG tube or no artificial nutrition like NG or OG tube as per daughter.   - IVF and BMP monitor for electrolyte imbalance.

## 2017-07-18 NOTE — PROGRESS NOTE ADULT - SUBJECTIVE AND OBJECTIVE BOX
PGY 1 Note discussed with supervising resident and primary attending    Patient is a 72y old  Female who presents with a chief complaint of Dizziness    INTERVAL HPI/OVERNIGHT EVENTS: offers no new complaints according to the nurse, Argentine speaking and legally blind and a small looking female    MEDICATIONS  (STANDING):  enoxaparin Injectable 40 milliGRAM(s) SubCutaneous daily  aspirin  chewable 81 milliGRAM(s) Oral daily  lisinopril 20 milliGRAM(s) Oral daily  insulin glargine Injectable (LANTUS) 24 Unit(s) SubCutaneous at bedtime  atorvastatin 20 milliGRAM(s) Oral at bedtime  ticagrelor 90 milliGRAM(s) Oral two times a day  metoprolol succinate ER 25 milliGRAM(s) Oral daily  amLODIPine   Tablet 10 milliGRAM(s) Oral daily  memantine 5 milliGRAM(s) Oral at bedtime  cholecalciferol 1000 Unit(s) Oral daily  insulin lispro (HumaLOG) corrective regimen sliding scale   SubCutaneous three times a day before meals  cefTRIAXone   IVPB   IV Intermittent   mirtazapine 7.5 milliGRAM(s) Oral three times a day    MEDICATIONS  (PRN):  haloperidol    Injectable 1 milliGRAM(s) IV Push every 12 hours PRN Agitation      __________________________________________________  REVIEW OF SYSTEMS:    CONSTITUTIONAL: No fever,   EYES: legally blind  NECK: No pain or stiffness  RESPIRATORY: No cough; No shortness of breath  CARDIOVASCULAR: No chest pain, no palpitations  GASTROINTESTINAL: No pain. No nausea or vomiting; No diarrhea   NEUROLOGICAL: No headache or numbness, no tremors  MUSCULOSKELETAL: No joint pain, no muscle pain  GENITOURINARY: no dysuria, no frequency, no hesitancy  ALL OTHER  ROS negative        Vital Signs Last 24 Hrs  T(C): 37.2 (2017 11:58), Max: 37.2 (2017 11:58)  T(F): 98.9 (2017 11:58), Max: 98.9 (2017 11:58)  HR: 74 (2017 11:58) (58 - 80)  BP: 137/86 (2017 11:58) (121/75 - 183/77)  BP(mean): --  RR: 16 (2017 11:58) (16 - 20)  SpO2: 100% (2017 11:58) (98% - 100%)    ________________________________________________  PHYSICAL EXAM:  GENERAL: NAD, small looking [place] female, lying comfortably in bed and eating without any difficulty  HEENT: Normocephalic;  conjunctivae and sclerae clear; moist mucous membranes;   NECK : supple  CHEST/LUNG: Clear to auscultation bilaterally with good air entry b/l  HEART: S1 S2  regular; no murmurs, gallops or rubs  ABDOMEN: Soft, Nontender, Nondistended; Bowel sounds present  EXTREMITIES: no cyanosis; no edema; no calf tenderness  SKIN: warm and dry; no rash  NERVOUS SYSTEM:  Awake and alert; demented    _________________________________________________  LABS:                        12.7   6.9   )-----------( 343      ( 2017 22:47 )             38.0     -18    141  |  104  |  7   ----------------------------<  212<H>  3.7   |  24  |  0.71    Ca    8.3<L>      2017 07:36  Phos  3.1     -18  Mg     1.8     -18    TPro  6.0  /  Alb  2.7<L>  /  TBili  0.4  /  DBili  x   /  AST  13  /  ALT  12  /  AlkPhos  87  07-17      Urinalysis Basic - ( 2017 01:16 )    Color: Yellow / Appearance: Clear / S.015 / pH: x  Gluc: x / Ketone: Negative  / Bili: Negative / Urobili: Negative   Blood: x / Protein: 30 mg/dL / Nitrite: Negative   Leuk Esterase: Moderate / RBC: 2-5 /HPF / WBC 11-25 /HPF   Sq Epi: x / Non Sq Epi: Few / Bacteria: Moderate /HPF      CAPILLARY BLOOD GLUCOSE  279 (2017 11:58)  206 (2017 07:39)            RADIOLOGY & ADDITIONAL TESTS:    CXR on : No focal consolidation or pleural effusion.  Urine Cultures: Pending    Dr. Nina was consulted for suspicion of depression. He recommended Remeron 7.5 mg q8h.     Plan of care was discussed with patient and /or primary care giver; all questions and concerns were addressed and care was aligned with patient's wishes.

## 2017-07-19 DIAGNOSIS — G30.9 ALZHEIMER'S DISEASE, UNSPECIFIED: ICD-10-CM

## 2017-07-19 DIAGNOSIS — E11.65 TYPE 2 DIABETES MELLITUS WITH HYPERGLYCEMIA: ICD-10-CM

## 2017-07-19 DIAGNOSIS — E87.6 HYPOKALEMIA: ICD-10-CM

## 2017-07-19 DIAGNOSIS — F32.9 MAJOR DEPRESSIVE DISORDER, SINGLE EPISODE, UNSPECIFIED: ICD-10-CM

## 2017-07-19 DIAGNOSIS — E43 UNSPECIFIED SEVERE PROTEIN-CALORIE MALNUTRITION: ICD-10-CM

## 2017-07-19 DIAGNOSIS — E78.5 HYPERLIPIDEMIA, UNSPECIFIED: ICD-10-CM

## 2017-07-19 LAB
ANION GAP SERPL CALC-SCNC: 9 MMOL/L — SIGNIFICANT CHANGE UP (ref 5–17)
BUN SERPL-MCNC: 5 MG/DL — LOW (ref 7–18)
CALCIUM SERPL-MCNC: 8.5 MG/DL — SIGNIFICANT CHANGE UP (ref 8.4–10.5)
CHLORIDE SERPL-SCNC: 103 MMOL/L — SIGNIFICANT CHANGE UP (ref 96–108)
CO2 SERPL-SCNC: 29 MMOL/L — SIGNIFICANT CHANGE UP (ref 22–31)
CREAT SERPL-MCNC: 0.71 MG/DL — SIGNIFICANT CHANGE UP (ref 0.5–1.3)
GLUCOSE SERPL-MCNC: 114 MG/DL — HIGH (ref 70–99)
MAGNESIUM SERPL-MCNC: 1.8 MG/DL — SIGNIFICANT CHANGE UP (ref 1.6–2.6)
PHOSPHATE SERPL-MCNC: 2.3 MG/DL — LOW (ref 2.5–4.5)
POTASSIUM SERPL-MCNC: 3.2 MMOL/L — LOW (ref 3.5–5.3)
POTASSIUM SERPL-SCNC: 3.2 MMOL/L — LOW (ref 3.5–5.3)
SODIUM SERPL-SCNC: 141 MMOL/L — SIGNIFICANT CHANGE UP (ref 135–145)

## 2017-07-19 PROCEDURE — 99233 SBSQ HOSP IP/OBS HIGH 50: CPT | Mod: GC

## 2017-07-19 RX ORDER — INSULIN GLARGINE 100 [IU]/ML
12 INJECTION, SOLUTION SUBCUTANEOUS AT BEDTIME
Qty: 0 | Refills: 0 | Status: COMPLETED | OUTPATIENT
Start: 2017-07-19 | End: 2017-07-19

## 2017-07-19 RX ORDER — SODIUM,POTASSIUM PHOSPHATES 278-250MG
1 POWDER IN PACKET (EA) ORAL
Qty: 0 | Refills: 0 | Status: COMPLETED | OUTPATIENT
Start: 2017-07-19 | End: 2017-07-20

## 2017-07-19 RX ORDER — POTASSIUM CHLORIDE 20 MEQ
40 PACKET (EA) ORAL EVERY 4 HOURS
Qty: 0 | Refills: 0 | Status: COMPLETED | OUTPATIENT
Start: 2017-07-19 | End: 2017-07-19

## 2017-07-19 RX ADMIN — MEMANTINE HYDROCHLORIDE 5 MILLIGRAM(S): 10 TABLET ORAL at 21:21

## 2017-07-19 RX ADMIN — Medication 1 TABLET(S): at 21:19

## 2017-07-19 RX ADMIN — INSULIN GLARGINE 24 UNIT(S): 100 INJECTION, SOLUTION SUBCUTANEOUS at 21:20

## 2017-07-19 RX ADMIN — Medication 3: at 17:28

## 2017-07-19 RX ADMIN — TICAGRELOR 90 MILLIGRAM(S): 90 TABLET ORAL at 17:28

## 2017-07-19 RX ADMIN — Medication 81 MILLIGRAM(S): at 11:11

## 2017-07-19 RX ADMIN — ENOXAPARIN SODIUM 40 MILLIGRAM(S): 100 INJECTION SUBCUTANEOUS at 11:09

## 2017-07-19 RX ADMIN — MIRTAZAPINE 7.5 MILLIGRAM(S): 45 TABLET, ORALLY DISINTEGRATING ORAL at 21:19

## 2017-07-19 RX ADMIN — Medication 1000 UNIT(S): at 11:11

## 2017-07-19 RX ADMIN — MIRTAZAPINE 7.5 MILLIGRAM(S): 45 TABLET, ORALLY DISINTEGRATING ORAL at 13:33

## 2017-07-19 RX ADMIN — Medication 40 MILLIEQUIVALENT(S): at 12:08

## 2017-07-19 RX ADMIN — Medication 1 TABLET(S): at 17:28

## 2017-07-19 RX ADMIN — CEFTRIAXONE 100 GRAM(S): 500 INJECTION, POWDER, FOR SOLUTION INTRAMUSCULAR; INTRAVENOUS at 07:01

## 2017-07-19 RX ADMIN — ATORVASTATIN CALCIUM 20 MILLIGRAM(S): 80 TABLET, FILM COATED ORAL at 21:19

## 2017-07-19 RX ADMIN — INSULIN GLARGINE 12 UNIT(S): 100 INJECTION, SOLUTION SUBCUTANEOUS at 01:02

## 2017-07-19 NOTE — PROGRESS NOTE ADULT - ASSESSMENT
72 F from home, lives with daughter, with HHA 5hours per day 5 days a week PMH of  Dementia, Depression, DM, HTN, HLD, CAD x3 stents last stent in Aug 2016, Glaucoma, legally blind, brought in by Daughter  with worsening dementia, weakness and not eating and drinking sufficiently since May/2017. Being replenished for hypokalemia and hypophosphatemia. no EKG changes noted and tele is discontinued as per Dr. Brittany Stein. Physical therapy and Social work is consulted for the patient to go for hospice and short term placement

## 2017-07-19 NOTE — PROGRESS NOTE ADULT - PROBLEM SELECTOR PLAN 2
Hypokalemia-replaced. No EKG changes. NO U wave.   - Currently her K is 3.2  - D/c tele monitoring for no changes in EKG secondary to hypokalemia

## 2017-07-19 NOTE — PROGRESS NOTE ADULT - SUBJECTIVE AND OBJECTIVE BOX
Patient is a 72y old  Female who presents with a chief complaint of     INTERVAL HPI/OVERNIGHT EVENTS:  Patient seen and examined at bedside, looks depressed, refused to take any medication or to eat since yesterday night.  I was supposed to meet with the daughter today to further discuss goals of care as well as Hospice but she had to leave and will be back later on today.    REVIEW OF SYSTEMS:  Unalbe to obtain secondary to dementia    Medications:  haloperidol    Injectable 1 milliGRAM(s) IV Push every 12 hours PRN Agitation  enoxaparin Injectable 40 milliGRAM(s) SubCutaneous daily  aspirin  chewable 81 milliGRAM(s) Oral daily  lisinopril 20 milliGRAM(s) Oral daily  insulin glargine Injectable (LANTUS) 24 Unit(s) SubCutaneous at bedtime  atorvastatin 20 milliGRAM(s) Oral at bedtime  ticagrelor 90 milliGRAM(s) Oral two times a day  metoprolol succinate ER 25 milliGRAM(s) Oral daily  amLODIPine   Tablet 10 milliGRAM(s) Oral daily  memantine 5 milliGRAM(s) Oral at bedtime  cholecalciferol 1000 Unit(s) Oral daily  insulin lispro (HumaLOG) corrective regimen sliding scale   SubCutaneous three times a day before meals  cefTRIAXone   IVPB   IV Intermittent   cefTRIAXone   IVPB 1 Gram(s) IV Intermittent every 24 hours  mirtazapine 7.5 milliGRAM(s) Oral three times a day  insulin glargine Injectable (LANTUS) 12 Unit(s) SubCutaneous at bedtime  potassium acid phosphate/sodium acid phosphate tablet (K-PHOS No. 2) 1 Tablet(s) Oral four times a day with meals      PHYSICAL EXAM:  Vital Signs Last 24 Hrs  T(C): 36.6 (2017 11:34), Max: 37.1 (2017 04:56)  T(F): 97.8 (2017 11:34), Max: 98.8 (2017 04:56)  HR: 73 (2017 11:34) (57 - 75)  BP: 156/72 (2017 11:34) (127/64 - 164/68)  BP(mean): --  RR: 16 (2017 11:34) (16 - 18)  SpO2: 96% (2017 11:34) (96% - 100%)    GENERAL: NAD  HEAD:  Atraumatic, Normocephalic  EYES: EOMI, PERRLA, conjunctiva and sclera clear  ENT: moist mucous membranes  NECK: Supple, No JVD, Normal thyroid  NERVOUS SYSTEM:  Alert & Oriented X3, Good concentration; Motor Strength 5/5 B/L upper and lower extremities; DTRs 2+ intact and symmetric  CHEST/LUNG: No rales, rhonchi, wheezing, or rubs  HEART: Regular rate and rhythm; No murmurs, rubs, or gallops  ABDOMEN: Soft, Nontender, Nondistended; Bowel sounds present  EXTREMITIES:  2+ Peripheral Pulses, No clubbing, cyanosis, or edema  SKIN: No rashes or lesions    LABS:                        12.7   6.9   )-----------( 343      ( 2017 22:47 )             38.0         141  |  103  |  5<L>  ----------------------------<  114<H>  3.2<L>   |  29  |  0.71    Ca    8.5      2017 06:54  Phos  2.3       Mg     1.8         TPro  6.0  /  Alb  2.7<L>  /  TBili  0.4  /  DBili  x   /  AST  13  /  ALT  12  /  AlkPhos  87      LIVER FUNCTIONS - ( 2017 22:47 )  Alb: 2.7 g/dL / Pro: 6.0 g/dL / ALK PHOS: 87 U/L / ALT: 12 U/L DA / AST: 13 U/L / GGT: x                   Urinalysis Basic - ( 2017 01:16 )    Color: Yellow / Appearance: Clear / S.015 / pH: x  Gluc: x / Ketone: Negative  / Bili: Negative / Urobili: Negative   Blood: x / Protein: 30 mg/dL / Nitrite: Negative   Leuk Esterase: Moderate / RBC: 2-5 /HPF / WBC 11-25 /HPF   Sq Epi: x / Non Sq Epi: Few / Bacteria: Moderate /HPF      Culture & Sensitivity:   CAPILLARY BLOOD GLUCOSE  129 (2017 11:34)  128 (2017 08:08)  114 (2017 20:33)  156 (2017 16:03)     @ 07:01  -   @ 07:00  --------------------------------------------------------  IN: 318 mL / OUT: 0 mL / NET: 318 mL     @ 07: @ 13:22  --------------------------------------------------------  IN: 350 mL / OUT: 0 mL / NET: 350 mL        RADIOLOGY & ADDITIONAL TESTS:  Radiology testing independently reviewed:     Consultant(s) Notes Reviewed:  [ x] YES  [ ] NO    Care Discussed with Consultants/Other Providers [ x] YES  [ ] NO

## 2017-07-19 NOTE — PHYSICAL THERAPY INITIAL EVALUATION ADULT - GENERAL OBSERVATIONS, REHAB EVAL
Pt. received in supine; AxOx1; Pt. able to follow simple instructions; c/o min,. pain on (R)thigh and lower leg. Rn notified, states patient refusing pain meds.

## 2017-07-19 NOTE — PROGRESS NOTE ADULT - PROBLEM SELECTOR PLAN 3
Rocephin due to UA positive. No WBC, No fever.   Asymptomatic but unreliable compliant as pt is demented.   - Urine Cx are insufficient growth, will reintubate

## 2017-07-19 NOTE — PHYSICAL THERAPY INITIAL EVALUATION ADULT - PERTINENT HX OF CURRENT PROBLEM, REHAB EVAL
Pt. lives w/ dtr. ; has HHA  5 hrs x 5 days a week. Pt. admitted from home due to worsening Dementia and weakness; Pt. w/ h/o DM, HTN, legally blind.

## 2017-07-19 NOTE — PROGRESS NOTE ADULT - PROBLEM SELECTOR PLAN 3
will add prednisone low dose in a trial for appetite improvement  continue Remeron  Family and patient when was in full mental status refused to have any OGT/NGT/Peg

## 2017-07-20 LAB
CULTURE RESULTS: SIGNIFICANT CHANGE UP
SPECIMEN SOURCE: SIGNIFICANT CHANGE UP

## 2017-07-20 PROCEDURE — 99233 SBSQ HOSP IP/OBS HIGH 50: CPT | Mod: GC

## 2017-07-20 PROCEDURE — 99497 ADVNCD CARE PLAN 30 MIN: CPT | Mod: GC

## 2017-07-20 RX ORDER — CEFUROXIME AXETIL 250 MG
1 TABLET ORAL
Qty: 8 | Refills: 0 | OUTPATIENT
Start: 2017-07-20 | End: 2017-07-24

## 2017-07-20 RX ORDER — MIRTAZAPINE 45 MG/1
1 TABLET, ORALLY DISINTEGRATING ORAL
Qty: 0 | Refills: 0 | DISCHARGE
Start: 2017-07-20

## 2017-07-20 RX ORDER — INSULIN GLARGINE 100 [IU]/ML
10 INJECTION, SOLUTION SUBCUTANEOUS AT BEDTIME
Qty: 0 | Refills: 0 | Status: COMPLETED | OUTPATIENT
Start: 2017-07-20 | End: 2017-07-20

## 2017-07-20 RX ADMIN — MIRTAZAPINE 7.5 MILLIGRAM(S): 45 TABLET, ORALLY DISINTEGRATING ORAL at 22:32

## 2017-07-20 RX ADMIN — ATORVASTATIN CALCIUM 20 MILLIGRAM(S): 80 TABLET, FILM COATED ORAL at 22:32

## 2017-07-20 RX ADMIN — Medication 1 TABLET(S): at 11:37

## 2017-07-20 RX ADMIN — MIRTAZAPINE 7.5 MILLIGRAM(S): 45 TABLET, ORALLY DISINTEGRATING ORAL at 14:36

## 2017-07-20 RX ADMIN — Medication 1: at 16:39

## 2017-07-20 RX ADMIN — Medication 1 TABLET(S): at 16:39

## 2017-07-20 RX ADMIN — Medication 81 MILLIGRAM(S): at 11:37

## 2017-07-20 RX ADMIN — Medication 2: at 11:37

## 2017-07-20 RX ADMIN — ENOXAPARIN SODIUM 40 MILLIGRAM(S): 100 INJECTION SUBCUTANEOUS at 11:37

## 2017-07-20 RX ADMIN — MEMANTINE HYDROCHLORIDE 5 MILLIGRAM(S): 10 TABLET ORAL at 22:32

## 2017-07-20 RX ADMIN — CEFTRIAXONE 100 GRAM(S): 500 INJECTION, POWDER, FOR SOLUTION INTRAMUSCULAR; INTRAVENOUS at 07:26

## 2017-07-20 RX ADMIN — Medication 1 TABLET(S): at 22:32

## 2017-07-20 RX ADMIN — TICAGRELOR 90 MILLIGRAM(S): 90 TABLET ORAL at 19:14

## 2017-07-20 RX ADMIN — Medication 1 TABLET(S): at 07:43

## 2017-07-20 RX ADMIN — Medication 1000 UNIT(S): at 11:37

## 2017-07-20 RX ADMIN — INSULIN GLARGINE 10 UNIT(S): 100 INJECTION, SOLUTION SUBCUTANEOUS at 22:32

## 2017-07-20 NOTE — DISCHARGE NOTE ADULT - HOSPITAL COURSE
72 F from home, lives with daughter, with HHA 5hours per day 5 days a week PMH of  Dementia, Depression, DM, HTN, HLD, CAD x3 stents last stent in Aug 2016, Glaucoma, legally blind, brought in by Daughter  with worsening dementia, weakness and not eating and drinking sufficiently since May/2017. Pt was sedated due to agitation and poor co-operation currently, not oriented. As per daughter pt was refusing to take any of her meds since 2 months  as she is not eating or drinking much, Patient recent admission in May with the same compliant and this time daughter was asking for long term care placement. Daughter stated that patient has been mostly bedbound for last 6 months as she keep having recurrent falls and unsteady gait. She occasionally went into kitchen but stays in bed most of the time. Daughter stated she does not want PEG tube or any other kind of artificial nutrition. Patient was DNR/DNI on previous admission and daughter wanted to wait until morning until her sister came in to sign the form.   In ED, vitals were stable and pt was hypokalemic to 2.5 without any acute EKG changes and U wave. Refusing medication. Patient was sedated and potassium riders were given.    She was admitted for failure to thrive and was replinsed with electrolytes. Psych was consulted and the patient was put on remeron 7.5mg orally having the suspicion of depression and increasing her appetite. Pt was also started on ceftriaxone because of her positive urine analysis in the ED without any fever and WBC count. Her blood and urine cultures remained negative. Her CXR and EKG didn't show any acute abnormality. She remained on her home medications for diabetes and HTN. Dr. Brittany Stein talked to the daughter in detail about the DNR/DNI, no PEG tube or no artificial nutrition like NG or OG tube and hospice care. Pt is going to the residential hospice.     Pt is stable to go per attending. 72 F from home, lives with daughter, with HHA 5hours per day 5 days a week PMH of  Dementia, Depression, DM, HTN, HLD, CAD x3 stents last stent in Aug 2016, Glaucoma, legally blind, brought in by Daughter  with worsening dementia, weakness and not eating and drinking sufficiently since May/2017. Pt was sedated due to agitation and poor co-operation currently, not oriented. As per daughter pt was refusing to take any of her meds since 2 months  as she is not eating or drinking much, Patient recent admission in May with the same compliant and this time daughter was asking for long term care placement. Daughter stated that patient has been mostly bedbound for last 6 months as she keep having recurrent falls and unsteady gait. She occasionally went into kitchen but stays in bed most of the time. Daughter stated she does not want PEG tube or any other kind of artificial nutrition. Patient was DNR/DNI on previous admission and daughter wanted to wait until morning until her sister came in to sign the form.   In ED, vitals were stable and pt was hypokalemic to 2.5 without any acute EKG changes and U wave. Refusing medication. Patient was sedated and potassium riders were given.    She was admitted for failure to thrive and was replinsed with electrolytes. Psych was consulted and the patient was put on remeron 7.5mg orally having the suspicion of depression and increasing her appetite. Pt was also started on ceftriaxone because of her positive urine analysis in the ED without any fever and WBC count. Her blood and urine cultures remained negative. Her CXR and EKG didn't show any acute abnormality. She remained on her home medications for diabetes and HTN. Dr. Brittany Stein talked to the daughter in detail about the DNR/DNI, no PEG tube or no artificial nutrition like NG or OG tube and hospice care. Pt is going to the residential hospice.     Patient eventually improved, eating well and walking on her own.    Plan of care and assessment was discussed with family. They understands and agrees with the plan. Patient is stable and will be discharged to Long term facility in stable condition. 72 F from home, lives with daughter, with HHA 5hours per day 5 days a week PMH of  Dementia, Depression, DM, HTN, HLD, CAD x3 stents last stent in Aug 2016, Glaucoma, legally blind, brought in by Daughter  with worsening dementia, weakness and not eating and drinking sufficiently since May/2017. Pt was sedated due to agitation and poor co-operation currently, not oriented. As per daughter pt was refusing to take any of her meds since 2 months  as she is not eating or drinking much, Patient recent admission in May with the same compliant and this time daughter was asking for long term care placement. Daughter stated that patient has been mostly bedbound for last 6 months as she keep having recurrent falls and unsteady gait. She occasionally went into kitchen but stays in bed most of the time. Daughter stated she does not want PEG tube or any other kind of artificial nutrition. Patient was DNR/DNI on previous admission and daughter wanted to wait until morning until her sister came in to sign the form.   In ED, vitals were stable and pt was hypokalemic to 2.5 without any acute EKG changes and U wave. Refusing medication. Patient was sedated and potassium riders were given.    She was admitted for failure to thrive and was replinsed with electrolytes. Psych was consulted and the patient was put on remeron 7.5mg orally having the suspicion of depression and increasing her appetite. Pt was also started on ceftriaxone because of her positive urine analysis in the ED without any fever and WBC count. Her blood and urine cultures remained negative. Her CXR and EKG didn't show any acute abnormality. She remained on her home medications for diabetes and HTN. Dr. Brittany Stein talked to the daughter in detail about the DNR/DNI, no PEG tube or no artificial nutrition like NG or OG tube and hospice care. Psych service Dr. Noland saw pt for re-evaluation and rec low dose seroquel for agitation. Pt is going to [facility] term [facility]    Plan of care and assessment was discussed with family. They understands and agrees with the plan. Patient is medically stable and will be discharged to [facility] term [facility].

## 2017-07-20 NOTE — DISCHARGE NOTE ADULT - MEDICATION SUMMARY - MEDICATIONS TO TAKE
I will START or STAY ON the medications listed below when I get home from the hospital:    predniSONE 10 mg oral tablet  -- 1 tab(s) by mouth once a day  -- Indication: For Temporal artritis    aspirin 81 mg oral tablet  -- 1 tab(s) by mouth once a day  -- Indication: For Cardioprotection    lisinopril 10 mg oral tablet  -- 2 tab(s) by mouth once a day  -- Indication: For Cardioprotection    mirtazapine 7.5 mg oral tablet  -- 1 tab(s) by mouth 3 times a day  -- Indication: For Depression    Lantus  -- 24 unit(s) subcutaneous once a day (at bedtime)  -- Indication: For DM    metFORMIN 1000 mg oral tablet  -- 1 tab(s) by mouth 2 times a day  -- Indication: For Dm    atorvastatin 20 mg oral tablet  -- 1 tab(s) by mouth once a day  -- Indication: For cardioprotection    Brilinta (ticagrelor) 90 mg oral tablet  -- 1 tab(s) by mouth 2 times a day  -- Indication: For cardioprotection    haloperidol 1 mg oral tablet  -- 1 tab(s) by mouth 2 times a day  -- Indication: For psychosis    metoprolol succinate 25 mg oral tablet, extended release  -- 1 tab(s) by mouth once a day  -- Indication: For HTN    amLODIPine 10 mg oral tablet  -- 1 tab(s) by mouth once a day  -- Indication: For HTN    Ceftin 250 mg oral tablet  -- 1 tab(s) by mouth 2 times a day  -- Finish all this medication unless otherwise directed by prescriber.  Medication should be taken with plenty of water.  Take with food or milk.    -- Indication: For UTI (urinary tract infection)    memantine 5 mg oral tablet  -- 1 tab(s) by mouth once a day (at bedtime)  -- Indication: For Depression    cholecalciferol oral tablet  -- 1000 unit(s) by mouth once a day  -- Indication: For Supplement I will START or STAY ON the medications listed below when I get home from the hospital:    predniSONE 10 mg oral tablet  -- 1 tab(s) by mouth once a day  -- Indication: For Temporal artritis    aspirin 81 mg oral tablet  -- 1 tab(s) by mouth once a day  -- Indication: For Cardioprotection    lisinopril 10 mg oral tablet  -- 2 tab(s) by mouth once a day  -- Indication: For Cardioprotection    mirtazapine 7.5 mg oral tablet  -- 1 tab(s) by mouth 3 times a day  -- Indication: For Depression    Lantus  -- 24 unit(s) subcutaneous once a day (at bedtime)  -- Indication: For DM    metFORMIN 1000 mg oral tablet  -- 1 tab(s) by mouth 2 times a day  -- Indication: For Dm    atorvastatin 20 mg oral tablet  -- 1 tab(s) by mouth once a day  -- Indication: For cardioprotection    Brilinta (ticagrelor) 90 mg oral tablet  -- 1 tab(s) by mouth 2 times a day  -- Indication: For cardioprotection    haloperidol 1 mg oral tablet  -- 1 tab(s) by mouth 2 times a day  -- Indication: For psychosis    metoprolol succinate 25 mg oral tablet, extended release  -- 1 tab(s) by mouth once a day  -- Indication: For HTN    amLODIPine 10 mg oral tablet  -- 1 tab(s) by mouth once a day  -- Indication: For HTN    memantine 5 mg oral tablet  -- 1 tab(s) by mouth 2 times a day  -- Indication: For Dementia in Alzheimer's disease, atypical or mixed type    cholecalciferol oral tablet  -- 1000 unit(s) by mouth once a day  -- Indication: For Supplement I will START or STAY ON the medications listed below when I get home from the hospital:    predniSONE 10 mg oral tablet  -- 1 tab(s) by mouth once a day  -- Indication: For Temporal artritis    aspirin 81 mg oral tablet  -- 1 tab(s) by mouth once a day  -- Indication: For Cardioprotection    lisinopril 10 mg oral tablet  -- 2 tab(s) by mouth once a day  -- Indication: For Cardioprotection    mirtazapine 7.5 mg oral tablet  -- 1 tab(s) by mouth 3 times a day  -- Indication: For Depression    Lantus  -- 24 unit(s) subcutaneous once a day (at bedtime)  -- Indication: For DM    metFORMIN 1000 mg oral tablet  -- 1 tab(s) by mouth 2 times a day  -- Indication: For Dm    atorvastatin 20 mg oral tablet  -- 1 tab(s) by mouth once a day  -- Indication: For cardioprotection    Brilinta (ticagrelor) 90 mg oral tablet  -- 1 tab(s) by mouth 2 times a day  -- Indication: For cardioprotection    QUEtiapine  -- 12.5 milligram(s) by mouth 2 times a day  -- Indication: For Agitation    metoprolol succinate 25 mg oral tablet, extended release  -- 1 tab(s) by mouth once a day  -- Indication: For HTN    amLODIPine 10 mg oral tablet  -- 1 tab(s) by mouth once a day  -- Indication: For HTN    memantine 5 mg oral tablet  -- 1 tab(s) by mouth 2 times a day  -- Indication: For Dementia in Alzheimer's disease, atypical or mixed type    cholecalciferol oral tablet  -- 1000 unit(s) by mouth once a day  -- Indication: For Supplement

## 2017-07-20 NOTE — DISCHARGE NOTE ADULT - PLAN OF CARE
Hospice care Pt was admitted for low electrolytes secondary to no food intake. She was replenished with her electrolytes and prescribed Remeron by the psychiatrist. F/u with your pcp or neurology in 1 week after discharge Prevent weight loss and disability Pt had mild signs of depression as she in not taking her food and medications Keep HBA1C around 6.5 Continue home medications and f/u with pcp after discharge Keep BP around 140/90 Prevent social withdrawal Hospice care. F/u with pcp after discharge Pt had mild signs of depression as she in not taking her food and medications. Pt was put on remeron and outpatient follow up in needed after discharge To treat Please continue with Please continue with ceftin as prescrbed discharge to Long term Pt was admitted for low electrolytes secondary to no food intake. She was replenished with her electrolytes . patient is eating well. patient needs to continue with her diet. Continue home medications . regular follow up for diabetes control in 4 weeks. Continue home medications. Patient's blood pressure well controlled. Patient stable. eating well. continue taking medications as prescribed.

## 2017-07-20 NOTE — PROGRESS NOTE ADULT - PROBLEM SELECTOR PLAN 2
continue statin, hyperlipidemia likely secondary to medication non compliance from underlying dementia and medication refusal

## 2017-07-20 NOTE — DISCHARGE NOTE ADULT - NS AS DC STROKE ED MATERIALS
Risk Factors for Stroke/Call 911 for Stroke/Need for Followup After Discharge/Prescribed Medications/Stroke Warning Signs and Symptoms/Stroke Education Booklet

## 2017-07-20 NOTE — PROGRESS NOTE ADULT - SUBJECTIVE AND OBJECTIVE BOX
Patient is a 72y old  Female who presents with a chief complaint of worsening dementia, weakness and not eating and drinking (20 Jul 2017 11:40)    INTERVAL HPI/OVERNIGHT EVENTS:  Patient seen and examined at bedside. Today she looks ok and ate her breakfast tray in addition feeling hungry and asking nurses for second tray and crackers. She was started yesterday on Prednisone in addition to Remeron to improve her appetite  Met with daughter and extensive discussion about the goals of care. She has the HCP with her other siblings, and all agree on DNR/ DNI with comfort measures.  I explained to her that her Mom is a candidate for Hospice and she agrees for it.  I spoke later with  and she will make referral for residential Hospice and will give the daughter the facilities that offer hospice care on premises.    REVIEW OF SYSTEMS:  unable to obtain due to underlying dementia    Medications:  haloperidol    Injectable 1 milliGRAM(s) IV Push every 12 hours PRN Agitation  enoxaparin Injectable 40 milliGRAM(s) SubCutaneous daily  aspirin  chewable 81 milliGRAM(s) Oral daily  lisinopril 20 milliGRAM(s) Oral daily  insulin glargine Injectable (LANTUS) 24 Unit(s) SubCutaneous at bedtime  atorvastatin 20 milliGRAM(s) Oral at bedtime  ticagrelor 90 milliGRAM(s) Oral two times a day  metoprolol succinate ER 25 milliGRAM(s) Oral daily  amLODIPine   Tablet 10 milliGRAM(s) Oral daily  memantine 5 milliGRAM(s) Oral at bedtime  cholecalciferol 1000 Unit(s) Oral daily  insulin lispro (HumaLOG) corrective regimen sliding scale   SubCutaneous three times a day before meals  cefTRIAXone   IVPB   IV Intermittent   cefTRIAXone   IVPB 1 Gram(s) IV Intermittent every 24 hours  mirtazapine 7.5 milliGRAM(s) Oral three times a day  potassium acid phosphate/sodium acid phosphate tablet (K-PHOS No. 2) 1 Tablet(s) Oral four times a day with meals  predniSONE   Tablet 10 milliGRAM(s) Oral daily      PHYSICAL EXAM:  Vital Signs Last 24 Hrs  T(C): 36.8 (20 Jul 2017 15:25), Max: 37 (20 Jul 2017 07:19)  T(F): 98.3 (20 Jul 2017 15:25), Max: 98.6 (20 Jul 2017 07:19)  HR: 82 (20 Jul 2017 15:25) (66 - 89)  BP: 129/67 (20 Jul 2017 15:25) (118/51 - 162/83)  BP(mean): --  RR: 16 (20 Jul 2017 15:25) (16 - 18)  SpO2: 100% (20 Jul 2017 15:25) (100% - 100%)    GENERAL: NAD  HEAD:  Atraumatic, Normocephalic  EYES: EOMI, PERRLA, conjunctiva and sclera clear  ENT: moist mucous membranes  NECK: Supple, No JVD, Normal thyroid  NERVOUS SYSTEM:  Alert & Oriented X3, Good concentration; Motor Strength 5/5 B/L upper and lower extremities; DTRs 2+ intact and symmetric  CHEST/LUNG: No rales, rhonchi, wheezing, or rubs  HEART: Regular rate and rhythm; No murmurs, rubs, or gallops  ABDOMEN: Soft, Nontender, Nondistended; Bowel sounds present  EXTREMITIES:  2+ Peripheral Pulses, No clubbing, cyanosis, or edema  SKIN: No rashes or lesions    LABS:    07-19    141  |  103  |  5<L>  ----------------------------<  114<H>  3.2<L>   |  29  |  0.71    Ca    8.5      19 Jul 2017 06:54  Phos  2.3     07-19  Mg     1.8     07-19    Culture & Sensitivity:   CAPILLARY BLOOD GLUCOSE  200 (20 Jul 2017 16:07)  216 (20 Jul 2017 11:23)  121 (20 Jul 2017 07:19)  285 (19 Jul 2017 20:11)    07-19 @ 07:01 - 07-20 @ 07:00  --------------------------------------------------------  IN: 350 mL / OUT: 0 mL / NET: 350 mL    07-20 @ 07:01 - 07-20 @ 17:07  --------------------------------------------------------  IN: 500 mL / OUT: 0 mL / NET: 500 mL    Consultant(s) Notes Reviewed:  [ x] YES  [ ] NO    Care Discussed with Consultants/Other Providers [ x] YES  [ ] NO

## 2017-07-20 NOTE — DISCHARGE NOTE ADULT - MEDICATION SUMMARY - MEDICATIONS TO STOP TAKING
I will STOP taking the medications listed below when I get home from the hospital:    Brilinta (ticagrelor) 90 mg oral tablet  -- 1 tab(s) by mouth 2 times a day I will STOP taking the medications listed below when I get home from the hospital:  None

## 2017-07-20 NOTE — PROGRESS NOTE ADULT - ASSESSMENT
HPI:  72 F from home, lives with daughter, with HHA 5hours per day 5 days a week PMH of  Dementia, Depression, DM, HTN, HLD, CAD x3 stents last stent in Aug 2016, Glaucoma, legally blind, brought in by Daughter  with worsening dementia, weakness and not eating and drinking sufficiently since May/2017. Pt is sedated due to agitation and poor co-operation currently, not oriented. As per daughter pt is refusing to take any of her meds since 2 months  as she is not eating or drinking much, Patient recent admission in May with the same compliant and this time daughter was asking for long term care placement. Daughter stated that patient has been mostly bedbound for last 6 months as she keep having recurrent falls and unsteady gait. She occasionally went into kitchen but stays in bed most of the time. Daughter stated she does not want PEG tube or any other kind of artificial nutrition. Patient was DNR/DNI on previous admission and daughter wanted to wait until morning until her sister came in to sign the form.   In ED, vitals were stable and pt is hypokalemic to 2.5 without no acute EKG changes and NO U wave. Refusing medication. Patient was sedated and potassium riders were given. (17 Jul 2017 22:51)

## 2017-07-20 NOTE — DISCHARGE NOTE ADULT - CARE PLAN
Principal Discharge DX:	Failure to thrive syndrome, adult  Goal:	Hospice care  Instructions for follow-up, activity and diet:	Pt was admitted for low electrolytes secondary to no food intake. She was replenished with her electrolytes and prescribed Remeron by the psychiatrist. F/u with your pcp or neurology in 1 week after discharge  Secondary Diagnosis:	Depression  Goal:	Prevent weight loss and disability  Instructions for follow-up, activity and diet:	Pt had mild signs of depression as she in not taking her food and medications  Secondary Diagnosis:	Diabetes  Secondary Diagnosis:	Hypertension  Secondary Diagnosis:	Social problem  Secondary Diagnosis:	UTI (urinary tract infection) Principal Discharge DX:	Failure to thrive syndrome, adult  Goal:	Hospice care  Instructions for follow-up, activity and diet:	Pt was admitted for low electrolytes secondary to no food intake. She was replenished with her electrolytes and prescribed Remeron by the psychiatrist. F/u with your pcp or neurology in 1 week after discharge  Secondary Diagnosis:	Depression  Goal:	Prevent weight loss and disability  Instructions for follow-up, activity and diet:	Pt had mild signs of depression as she in not taking her food and medications. Pt was put on remeron and outpatient follow up in needed after discharge  Secondary Diagnosis:	Diabetes  Goal:	Keep HBA1C around 6.5  Instructions for follow-up, activity and diet:	Continue home medications and f/u with pcp after discharge  Secondary Diagnosis:	Hypertension  Goal:	Keep BP around 140/90  Instructions for follow-up, activity and diet:	Continue home medications and f/u with pcp after discharge  Secondary Diagnosis:	Social problem  Goal:	Prevent social withdrawal  Instructions for follow-up, activity and diet:	Hospice care. F/u with pcp after discharge  Secondary Diagnosis:	UTI (urinary tract infection) Principal Discharge DX:	Failure to thrive syndrome, adult  Goal:	Hospice care  Instructions for follow-up, activity and diet:	Pt was admitted for low electrolytes secondary to no food intake. She was replenished with her electrolytes and prescribed Remeron by the psychiatrist. F/u with your pcp or neurology in 1 week after discharge  Secondary Diagnosis:	Depression  Goal:	Prevent weight loss and disability  Instructions for follow-up, activity and diet:	Pt had mild signs of depression as she in not taking her food and medications. Pt was put on remeron and outpatient follow up in needed after discharge  Secondary Diagnosis:	Diabetes  Goal:	Keep HBA1C around 6.5  Instructions for follow-up, activity and diet:	Continue home medications and f/u with pcp after discharge  Secondary Diagnosis:	Hypertension  Goal:	Keep BP around 140/90  Instructions for follow-up, activity and diet:	Continue home medications and f/u with pcp after discharge Principal Discharge DX:	Failure to thrive syndrome, adult  Goal:	Hospice care  Instructions for follow-up, activity and diet:	Pt was admitted for low electrolytes secondary to no food intake. She was replenished with her electrolytes and prescribed Remeron by the psychiatrist. F/u with your pcp or neurology in 1 week after discharge  Secondary Diagnosis:	Depression  Goal:	Prevent weight loss and disability  Instructions for follow-up, activity and diet:	Pt had mild signs of depression as she in not taking her food and medications. Pt was put on remeron and outpatient follow up in needed after discharge  Secondary Diagnosis:	Diabetes  Goal:	Keep HBA1C around 6.5  Instructions for follow-up, activity and diet:	Continue home medications and f/u with pcp after discharge  Secondary Diagnosis:	Hypertension  Goal:	Keep BP around 140/90  Instructions for follow-up, activity and diet:	Continue home medications and f/u with pcp after discharge  Secondary Diagnosis:	UTI (urinary tract infection)  Goal:	To treat  Instructions for follow-up, activity and diet:	Please continue with Principal Discharge DX:	Failure to thrive syndrome, adult  Goal:	Hospice care  Instructions for follow-up, activity and diet:	Pt was admitted for low electrolytes secondary to no food intake. She was replenished with her electrolytes and prescribed Remeron by the psychiatrist. F/u with your pcp or neurology in 1 week after discharge  Secondary Diagnosis:	Depression  Goal:	Prevent weight loss and disability  Instructions for follow-up, activity and diet:	Pt had mild signs of depression as she in not taking her food and medications. Pt was put on remeron and outpatient follow up in needed after discharge  Secondary Diagnosis:	Diabetes  Goal:	Keep HBA1C around 6.5  Instructions for follow-up, activity and diet:	Continue home medications and f/u with pcp after discharge  Secondary Diagnosis:	Hypertension  Goal:	Keep BP around 140/90  Instructions for follow-up, activity and diet:	Continue home medications and f/u with pcp after discharge  Secondary Diagnosis:	UTI (urinary tract infection)  Goal:	To treat  Instructions for follow-up, activity and diet:	Please continue with ceftin as prescrbed Principal Discharge DX:	Failure to thrive syndrome, adult  Goal:	discharge to Long term  Instructions for follow-up, activity and diet:	Pt was admitted for low electrolytes secondary to no food intake. She was replenished with her electrolytes . patient is eating well. patient needs to continue with her diet.  Secondary Diagnosis:	Depression  Goal:	Prevent weight loss and disability  Instructions for follow-up, activity and diet:	Patient stable. eating well. continue taking medications as prescribed.  Secondary Diagnosis:	Diabetes  Goal:	Keep HBA1C around 6.5  Instructions for follow-up, activity and diet:	Continue home medications . regular follow up for diabetes control in 4 weeks.  Secondary Diagnosis:	Hypertension  Goal:	Keep BP around 140/90  Instructions for follow-up, activity and diet:	Continue home medications. Patient's blood pressure well controlled.

## 2017-07-20 NOTE — PROGRESS NOTE ADULT - PROBLEM SELECTOR PLAN 3
will add prednisone low dose in a trial for appetite improvement  continue Remeron  Family and patient when was in full mental status refused to have any OGT/NGT/Peg  Hospice referral, pleasure feeds as tolerated

## 2017-07-21 PROCEDURE — 99232 SBSQ HOSP IP/OBS MODERATE 35: CPT | Mod: GC

## 2017-07-21 RX ORDER — SODIUM,POTASSIUM PHOSPHATES 278-250MG
1 POWDER IN PACKET (EA) ORAL
Qty: 0 | Refills: 0 | Status: COMPLETED | OUTPATIENT
Start: 2017-07-21 | End: 2017-07-23

## 2017-07-21 RX ORDER — POTASSIUM CHLORIDE 20 MEQ
40 PACKET (EA) ORAL ONCE
Qty: 0 | Refills: 0 | Status: COMPLETED | OUTPATIENT
Start: 2017-07-21 | End: 2017-07-21

## 2017-07-21 RX ADMIN — TICAGRELOR 90 MILLIGRAM(S): 90 TABLET ORAL at 17:22

## 2017-07-21 RX ADMIN — Medication 81 MILLIGRAM(S): at 12:55

## 2017-07-21 RX ADMIN — CEFTRIAXONE 100 GRAM(S): 500 INJECTION, POWDER, FOR SOLUTION INTRAMUSCULAR; INTRAVENOUS at 06:19

## 2017-07-21 RX ADMIN — Medication 1 TABLET(S): at 17:22

## 2017-07-21 RX ADMIN — Medication 4: at 17:21

## 2017-07-21 RX ADMIN — MEMANTINE HYDROCHLORIDE 5 MILLIGRAM(S): 10 TABLET ORAL at 21:21

## 2017-07-21 RX ADMIN — MIRTAZAPINE 7.5 MILLIGRAM(S): 45 TABLET, ORALLY DISINTEGRATING ORAL at 14:45

## 2017-07-21 RX ADMIN — Medication 1000 UNIT(S): at 12:55

## 2017-07-21 RX ADMIN — Medication 40 MILLIEQUIVALENT(S): at 12:56

## 2017-07-21 RX ADMIN — Medication 2: at 08:16

## 2017-07-21 RX ADMIN — Medication 1: at 12:53

## 2017-07-21 RX ADMIN — Medication 1 TABLET(S): at 21:21

## 2017-07-21 RX ADMIN — Medication 1 TABLET(S): at 12:55

## 2017-07-21 RX ADMIN — ATORVASTATIN CALCIUM 20 MILLIGRAM(S): 80 TABLET, FILM COATED ORAL at 21:21

## 2017-07-21 RX ADMIN — MIRTAZAPINE 7.5 MILLIGRAM(S): 45 TABLET, ORALLY DISINTEGRATING ORAL at 21:21

## 2017-07-21 RX ADMIN — ENOXAPARIN SODIUM 40 MILLIGRAM(S): 100 INJECTION SUBCUTANEOUS at 12:53

## 2017-07-21 RX ADMIN — INSULIN GLARGINE 24 UNIT(S): 100 INJECTION, SOLUTION SUBCUTANEOUS at 21:21

## 2017-07-21 NOTE — PROGRESS NOTE ADULT - SUBJECTIVE AND OBJECTIVE BOX
Patient is a 72y old  Female who presents with a chief complaint of worsening dementia, weakness and not eating and drinking (20 Jul 2017 11:40)    INTERVAL HPI/ OVERNIGHT EVENTS:  Patient seen and examined at bedside, feeling ok, today she also feels hungry, she ate 2 breakfast trays.  Discussed at length with the daughter yesterday, MOLST form signed and filed, patient is DNR/DNI   Referral for Hospice made and pending placement.     Allergies    No Known Allergies    Intolerances    REVIEW OF SYSTEMS:  Unable to obtain due ot underlying mental status  Medications:  haloperidol    Injectable 1 milliGRAM(s) IV Push every 12 hours PRN Agitation  enoxaparin Injectable 40 milliGRAM(s) SubCutaneous daily  aspirin  chewable 81 milliGRAM(s) Oral daily  lisinopril 20 milliGRAM(s) Oral daily  insulin glargine Injectable (LANTUS) 24 Unit(s) SubCutaneous at bedtime  atorvastatin 20 milliGRAM(s) Oral at bedtime  ticagrelor 90 milliGRAM(s) Oral two times a day  metoprolol succinate ER 25 milliGRAM(s) Oral daily  amLODIPine   Tablet 10 milliGRAM(s) Oral daily  memantine 5 milliGRAM(s) Oral at bedtime  cholecalciferol 1000 Unit(s) Oral daily  insulin lispro (HumaLOG) corrective regimen sliding scale   SubCutaneous three times a day before meals  cefTRIAXone   IVPB   IV Intermittent   cefTRIAXone   IVPB 1 Gram(s) IV Intermittent every 24 hours  mirtazapine 7.5 milliGRAM(s) Oral three times a day  predniSONE   Tablet 10 milliGRAM(s) Oral daily  potassium chloride   Powder 40 milliEquivalent(s) Oral once  potassium acid phosphate/sodium acid phosphate tablet (K-PHOS No. 2) 1 Tablet(s) Oral four times a day with meals      PHYSICAL EXAM:  Vital Signs Last 24 Hrs  T(C): 36.8 (21 Jul 2017 11:05), Max: 37.2 (20 Jul 2017 20:02)  T(F): 98.2 (21 Jul 2017 11:05), Max: 98.9 (20 Jul 2017 20:02)  HR: 91 (21 Jul 2017 11:05) (75 - 91)  BP: 122/75 (21 Jul 2017 11:05) (122/48 - 161/60)  BP(mean): --  RR: 16 (21 Jul 2017 11:05) (16 - 18)  SpO2: 100% (21 Jul 2017 11:05) (100% - 100%)    GENERAL: NAD  HEAD:  Atraumatic, Normocephalic  EYES: EOMI, PERRLA, conjunctiva and sclera clear  ENT: moist mucous membranes  NECK: Supple, No JVD, Normal thyroid  NERVOUS SYSTEM:  Alert & Oriented X3, Good concentration; Motor Strength 5/5 B/L upper and lower extremities; DTRs 2+ intact and symmetric  CHEST/LUNG: No rales, rhonchi, wheezing, or rubs  HEART: Regular rate and rhythm; No murmurs, rubs, or gallops  ABDOMEN: Soft, Nontender, Nondistended; Bowel sounds present  EXTREMITIES:  2+ Peripheral Pulses, No clubbing, cyanosis, or edema  SKIN: No rashes or lesions    LABS:    Culture & Sensitivity:   CAPILLARY BLOOD GLUCOSE  192 (21 Jul 2017 11:05)  209 (21 Jul 2017 07:22)  109 (20 Jul 2017 20:37)  200 (20 Jul 2017 16:07)      07-20 @ 07:01 - 07-21 @ 07:00  --------------------------------------------------------  IN: 500 mL / OUT: 0 mL / NET: 500 mL    07-21 @ 07:01 - 07-21 @ 12:44  --------------------------------------------------------  IN: 550 mL / OUT: 0 mL / NET: 550 mL    Consultant(s) Notes Reviewed:  [ x] YES  [ ] NO    Care Discussed with Consultants/Other Providers [ x] YES  [ ] NO

## 2017-07-21 NOTE — PROGRESS NOTE ADULT - PROBLEM SELECTOR PLAN 3
Improved,   prednisone low dose in a trial for appetite improvement  continue Remeron  Family and patient when was in full mental status refused to have any OGT/ NGT/ Peg  Hospice referral, pleasure feeds as tolerated

## 2017-07-21 NOTE — PROGRESS NOTE ADULT - PROBLEM SELECTOR PLAN 2
continue statin, hyperlipidemia likely secondary to medication non compliance from underlying dementia and medication refusal.

## 2017-07-22 LAB
ANION GAP SERPL CALC-SCNC: 8 MMOL/L — SIGNIFICANT CHANGE UP (ref 5–17)
BUN SERPL-MCNC: 18 MG/DL — SIGNIFICANT CHANGE UP (ref 7–18)
CALCIUM SERPL-MCNC: 8.2 MG/DL — LOW (ref 8.4–10.5)
CHLORIDE SERPL-SCNC: 104 MMOL/L — SIGNIFICANT CHANGE UP (ref 96–108)
CO2 SERPL-SCNC: 27 MMOL/L — SIGNIFICANT CHANGE UP (ref 22–31)
CREAT SERPL-MCNC: 0.98 MG/DL — SIGNIFICANT CHANGE UP (ref 0.5–1.3)
GLUCOSE SERPL-MCNC: 271 MG/DL — HIGH (ref 70–99)
HCT VFR BLD CALC: 33.6 % — LOW (ref 34.5–45)
HGB BLD-MCNC: 11 G/DL — LOW (ref 11.5–15.5)
MAGNESIUM SERPL-MCNC: 1.7 MG/DL — SIGNIFICANT CHANGE UP (ref 1.6–2.6)
MCHC RBC-ENTMCNC: 28.5 PG — SIGNIFICANT CHANGE UP (ref 27–34)
MCHC RBC-ENTMCNC: 32.6 GM/DL — SIGNIFICANT CHANGE UP (ref 32–36)
MCV RBC AUTO: 87.5 FL — SIGNIFICANT CHANGE UP (ref 80–100)
PHOSPHATE SERPL-MCNC: 3.3 MG/DL — SIGNIFICANT CHANGE UP (ref 2.5–4.5)
PLATELET # BLD AUTO: 338 K/UL — SIGNIFICANT CHANGE UP (ref 150–400)
POTASSIUM SERPL-MCNC: 4 MMOL/L — SIGNIFICANT CHANGE UP (ref 3.5–5.3)
POTASSIUM SERPL-SCNC: 4 MMOL/L — SIGNIFICANT CHANGE UP (ref 3.5–5.3)
RBC # BLD: 3.84 M/UL — SIGNIFICANT CHANGE UP (ref 3.8–5.2)
RBC # FLD: 14.2 % — SIGNIFICANT CHANGE UP (ref 10.3–14.5)
SODIUM SERPL-SCNC: 139 MMOL/L — SIGNIFICANT CHANGE UP (ref 135–145)
WBC # BLD: 6.7 K/UL — SIGNIFICANT CHANGE UP (ref 3.8–10.5)
WBC # FLD AUTO: 6.7 K/UL — SIGNIFICANT CHANGE UP (ref 3.8–10.5)

## 2017-07-22 PROCEDURE — 99232 SBSQ HOSP IP/OBS MODERATE 35: CPT | Mod: GC

## 2017-07-22 RX ORDER — INSULIN GLARGINE 100 [IU]/ML
30 INJECTION, SOLUTION SUBCUTANEOUS AT BEDTIME
Qty: 0 | Refills: 0 | Status: DISCONTINUED | OUTPATIENT
Start: 2017-07-22 | End: 2017-07-23

## 2017-07-22 RX ORDER — INSULIN LISPRO 100/ML
3 VIAL (ML) SUBCUTANEOUS
Qty: 0 | Refills: 0 | Status: DISCONTINUED | OUTPATIENT
Start: 2017-07-22 | End: 2017-07-23

## 2017-07-22 RX ADMIN — Medication 6: at 11:41

## 2017-07-22 RX ADMIN — MIRTAZAPINE 7.5 MILLIGRAM(S): 45 TABLET, ORALLY DISINTEGRATING ORAL at 14:51

## 2017-07-22 RX ADMIN — Medication 3: at 08:29

## 2017-07-22 RX ADMIN — AMLODIPINE BESYLATE 10 MILLIGRAM(S): 2.5 TABLET ORAL at 05:35

## 2017-07-22 RX ADMIN — Medication 10 MILLIGRAM(S): at 05:35

## 2017-07-22 RX ADMIN — Medication 1 TABLET(S): at 14:57

## 2017-07-22 RX ADMIN — Medication 1 TABLET(S): at 08:33

## 2017-07-22 RX ADMIN — Medication 81 MILLIGRAM(S): at 14:57

## 2017-07-22 RX ADMIN — Medication 6: at 16:51

## 2017-07-22 RX ADMIN — ATORVASTATIN CALCIUM 20 MILLIGRAM(S): 80 TABLET, FILM COATED ORAL at 21:50

## 2017-07-22 RX ADMIN — Medication 25 MILLIGRAM(S): at 05:35

## 2017-07-22 RX ADMIN — MIRTAZAPINE 7.5 MILLIGRAM(S): 45 TABLET, ORALLY DISINTEGRATING ORAL at 05:35

## 2017-07-22 RX ADMIN — INSULIN GLARGINE 30 UNIT(S): 100 INJECTION, SOLUTION SUBCUTANEOUS at 21:50

## 2017-07-22 RX ADMIN — CEFTRIAXONE 100 GRAM(S): 500 INJECTION, POWDER, FOR SOLUTION INTRAMUSCULAR; INTRAVENOUS at 06:27

## 2017-07-22 RX ADMIN — MEMANTINE HYDROCHLORIDE 5 MILLIGRAM(S): 10 TABLET ORAL at 21:51

## 2017-07-22 RX ADMIN — TICAGRELOR 90 MILLIGRAM(S): 90 TABLET ORAL at 17:59

## 2017-07-22 RX ADMIN — HALOPERIDOL DECANOATE 1 MILLIGRAM(S): 100 INJECTION INTRAMUSCULAR at 19:30

## 2017-07-22 RX ADMIN — Medication 3 UNIT(S): at 16:51

## 2017-07-22 RX ADMIN — Medication 1 TABLET(S): at 21:51

## 2017-07-22 RX ADMIN — LISINOPRIL 20 MILLIGRAM(S): 2.5 TABLET ORAL at 05:35

## 2017-07-22 RX ADMIN — TICAGRELOR 90 MILLIGRAM(S): 90 TABLET ORAL at 05:35

## 2017-07-22 RX ADMIN — MIRTAZAPINE 7.5 MILLIGRAM(S): 45 TABLET, ORALLY DISINTEGRATING ORAL at 21:50

## 2017-07-22 RX ADMIN — Medication 1000 UNIT(S): at 14:57

## 2017-07-22 NOTE — PROGRESS NOTE ADULT - ASSESSMENT
72 F from home, lives with daughter, with HHA 5hours per day 5 days a week PMH of  Dementia, Depression, DM, HTN, HLD, CAD x3 stents last stent in Aug 2016, Glaucoma, legally blind, brought in by Daughter  with worsening dementia, weakness and not eating and drinking sufficiently since May/2017. No EKG changes noted and tele is discontinued as per Dr. Brittany Stein. Physical therapy and Social work is consulted for the patient to go for hospice and short term placement

## 2017-07-22 NOTE — PROGRESS NOTE ADULT - ATTENDING COMMENTS
Patient seen and examined at bedside, today she is moved to 4 S as tele is d/adiel.  Patient seen sitting in the corridor, with her daughter at bedside, tremendous improvement in appetite still and is eating hungrily donuts.  As per daughter mom swings between periods of eating too much to long days eating nothing.   her finger stick running high today up to 400, she received 24 units of lantus last night.  Last day contacted by Hospice care network and is not approved for the patient.  physical exam: wnl except for an elderly lady who is underweight and malnourished.  A/P  1- Dementia: continue remeron  2- Type 2 diabetes with hyperglycemia; adjust insulin to 30 units lantus at night and 3 units TID of lispro.  3- FTT in adult: continue remeron and prednisone  Pending long term placement  rest as above

## 2017-07-22 NOTE — PROGRESS NOTE ADULT - PROBLEM SELECTOR PLAN 1
-continue Memantine 5mg continue Memantine 5mg no midline spinal tenderness, numbness to the left scapular area,

## 2017-07-22 NOTE — PROGRESS NOTE ADULT - SUBJECTIVE AND OBJECTIVE BOX
PGY 1 Note discussed with supervising resident and primary attending    Patient is a 72y old  Female who presents with a chief complaint of worsening dementia, weakness and not eating and drinking (20 Jul 2017 11:40)      INTERVAL HPI/OVERNIGHT EVENTS: By the time I went to her room she was already moved to new floor     MEDICATIONS  (STANDING):  enoxaparin Injectable 40 milliGRAM(s) SubCutaneous daily  aspirin  chewable 81 milliGRAM(s) Oral daily  lisinopril 20 milliGRAM(s) Oral daily  insulin glargine Injectable (LANTUS) 24 Unit(s) SubCutaneous at bedtime  atorvastatin 20 milliGRAM(s) Oral at bedtime  ticagrelor 90 milliGRAM(s) Oral two times a day  metoprolol succinate ER 25 milliGRAM(s) Oral daily  amLODIPine   Tablet 10 milliGRAM(s) Oral daily  memantine 5 milliGRAM(s) Oral at bedtime  cholecalciferol 1000 Unit(s) Oral daily  insulin lispro (HumaLOG) corrective regimen sliding scale   SubCutaneous three times a day before meals  cefTRIAXone   IVPB   IV Intermittent   cefTRIAXone   IVPB 1 Gram(s) IV Intermittent every 24 hours  mirtazapine 7.5 milliGRAM(s) Oral three times a day  predniSONE   Tablet 10 milliGRAM(s) Oral daily  potassium acid phosphate/sodium acid phosphate tablet (K-PHOS No. 2) 1 Tablet(s) Oral four times a day with meals    MEDICATIONS  (PRN):  haloperidol    Injectable 1 milliGRAM(s) IV Push every 12 hours PRN Agitation      __________________________________________________  REVIEW OF SYSTEMS:    Not taken, Patient moved to new floor      Vital Signs Last 24 Hrs  T(C): 37.1 (22 Jul 2017 08:11), Max: 37.1 (22 Jul 2017 08:11)  T(F): 98.8 (22 Jul 2017 08:11), Max: 98.8 (22 Jul 2017 08:11)  HR: 79 (22 Jul 2017 08:11) (73 - 91)  BP: 153/54 (22 Jul 2017 08:11) (118/71 - 153/54)  BP(mean): --  RR: 17 (22 Jul 2017 08:11) (16 - 17)  SpO2: 100% (22 Jul 2017 08:11) (98% - 100%)    ________________________________________________  PHYSICAL EXAM:    NOt done    _________________________________________________  LABS:                        11.0   6.7   )-----------( 338      ( 22 Jul 2017 06:47 )             33.6     07-22    139  |  104  |  18  ----------------------------<  271<H>  4.0   |  27  |  0.98    Ca    8.2<L>      22 Jul 2017 06:47  Phos  3.3     07-22  Mg     1.7     07-22          CAPILLARY BLOOD GLUCOSE  279 (22 Jul 2017 08:11)  300 (21 Jul 2017 21:07)  302 (21 Jul 2017 16:17)  192 (21 Jul 2017 11:05)    Culture - Urine (07.18.17 @ 10:32)    Specimen Source: .Urine Clean Catch (Midstream)    Culture Results:   Culture grew 3 or more types of organisms which indicate  collection contamination; consider recollection only if clinically  indicated.    Culture - Blood (07.18.17 @ 09:54)    Specimen Source: .Blood Blood-Venous    Culture Results:   No growth to date.        Plan of care was discussed with patient and /or primary care giver; all questions and concerns were addressed and care was aligned with patient's wishes.

## 2017-07-23 PROCEDURE — 99233 SBSQ HOSP IP/OBS HIGH 50: CPT | Mod: GC

## 2017-07-23 RX ORDER — HALOPERIDOL DECANOATE 100 MG/ML
1 INJECTION INTRAMUSCULAR ONCE
Qty: 0 | Refills: 0 | Status: COMPLETED | OUTPATIENT
Start: 2017-07-23 | End: 2017-07-27

## 2017-07-23 RX ORDER — INSULIN GLARGINE 100 [IU]/ML
37 INJECTION, SOLUTION SUBCUTANEOUS AT BEDTIME
Qty: 0 | Refills: 0 | Status: DISCONTINUED | OUTPATIENT
Start: 2017-07-23 | End: 2017-07-28

## 2017-07-23 RX ORDER — INSULIN LISPRO 100/ML
6 VIAL (ML) SUBCUTANEOUS
Qty: 0 | Refills: 0 | Status: DISCONTINUED | OUTPATIENT
Start: 2017-07-23 | End: 2017-08-02

## 2017-07-23 RX ADMIN — ENOXAPARIN SODIUM 40 MILLIGRAM(S): 100 INJECTION SUBCUTANEOUS at 12:25

## 2017-07-23 RX ADMIN — Medication 3 UNIT(S): at 12:26

## 2017-07-23 RX ADMIN — Medication 1 TABLET(S): at 08:55

## 2017-07-23 RX ADMIN — AMLODIPINE BESYLATE 10 MILLIGRAM(S): 2.5 TABLET ORAL at 05:31

## 2017-07-23 RX ADMIN — MIRTAZAPINE 7.5 MILLIGRAM(S): 45 TABLET, ORALLY DISINTEGRATING ORAL at 21:31

## 2017-07-23 RX ADMIN — INSULIN GLARGINE 37 UNIT(S): 100 INJECTION, SOLUTION SUBCUTANEOUS at 21:32

## 2017-07-23 RX ADMIN — LISINOPRIL 20 MILLIGRAM(S): 2.5 TABLET ORAL at 05:31

## 2017-07-23 RX ADMIN — MEMANTINE HYDROCHLORIDE 5 MILLIGRAM(S): 10 TABLET ORAL at 21:31

## 2017-07-23 RX ADMIN — Medication 6 UNIT(S): at 16:52

## 2017-07-23 RX ADMIN — Medication 3: at 16:51

## 2017-07-23 RX ADMIN — Medication 2: at 12:25

## 2017-07-23 RX ADMIN — MIRTAZAPINE 7.5 MILLIGRAM(S): 45 TABLET, ORALLY DISINTEGRATING ORAL at 12:25

## 2017-07-23 RX ADMIN — ATORVASTATIN CALCIUM 20 MILLIGRAM(S): 80 TABLET, FILM COATED ORAL at 21:31

## 2017-07-23 RX ADMIN — Medication 1000 UNIT(S): at 12:25

## 2017-07-23 RX ADMIN — Medication 10 MILLIGRAM(S): at 05:31

## 2017-07-23 RX ADMIN — Medication 3 UNIT(S): at 08:54

## 2017-07-23 RX ADMIN — TICAGRELOR 90 MILLIGRAM(S): 90 TABLET ORAL at 05:31

## 2017-07-23 RX ADMIN — Medication 81 MILLIGRAM(S): at 12:25

## 2017-07-23 RX ADMIN — Medication 5 MILLIGRAM(S): at 16:52

## 2017-07-23 RX ADMIN — Medication 25 MILLIGRAM(S): at 05:31

## 2017-07-23 RX ADMIN — MIRTAZAPINE 7.5 MILLIGRAM(S): 45 TABLET, ORALLY DISINTEGRATING ORAL at 05:31

## 2017-07-23 RX ADMIN — TICAGRELOR 90 MILLIGRAM(S): 90 TABLET ORAL at 16:53

## 2017-07-23 RX ADMIN — Medication 6: at 08:53

## 2017-07-23 NOTE — PROGRESS NOTE ADULT - SUBJECTIVE AND OBJECTIVE BOX
PGY 1 Note discussed with supervising resident and primary attending    Patient is a 72y old  Female who presents with a chief complaint of worsening dementia, weakness and not eating and drinking (20 Jul 2017 11:40)      INTERVAL HPI/OVERNIGHT EVENTS: Patient was seen and examined at bed side. Blood sugars not well controlled. 296 in evening. Patient on Prednisone.     MEDICATIONS  (STANDING):  enoxaparin Injectable 40 milliGRAM(s) SubCutaneous daily  aspirin  chewable 81 milliGRAM(s) Oral daily  lisinopril 20 milliGRAM(s) Oral daily  atorvastatin 20 milliGRAM(s) Oral at bedtime  ticagrelor 90 milliGRAM(s) Oral two times a day  metoprolol succinate ER 25 milliGRAM(s) Oral daily  amLODIPine   Tablet 10 milliGRAM(s) Oral daily  memantine 5 milliGRAM(s) Oral at bedtime  cholecalciferol 1000 Unit(s) Oral daily  insulin lispro (HumaLOG) corrective regimen sliding scale   SubCutaneous three times a day before meals  mirtazapine 7.5 milliGRAM(s) Oral three times a day  insulin glargine Injectable (LANTUS) 37 Unit(s) SubCutaneous at bedtime  insulin lispro Injectable (HumaLOG) 6 Unit(s) SubCutaneous three times a day before meals  predniSONE   Tablet 5 milliGRAM(s) Oral daily    MEDICATIONS  (PRN):  haloperidol    Injectable 1 milliGRAM(s) IV Push every 12 hours PRN Agitation      __________________________________________________  REVIEW OF SYSTEMS:    CONSTITUTIONAL: No fever,   EYES: no acute visual disturbances  NECK: No pain or stiffness  RESPIRATORY: No cough; No shortness of breath  CARDIOVASCULAR: No chest pain, no palpitations  GASTROINTESTINAL: No pain. No nausea or vomiting; No diarrhea   NEUROLOGICAL: No headache or numbness, no tremors  MUSCULOSKELETAL: No joint pain, no muscle pain  GENITOURINARY: no dysuria, no frequency, no hesitancy  PSYCHIATRY: no depression , no anxiety  ALL OTHER  ROS negative        Vital Signs Last 24 Hrs  T(C): 36.7 (23 Jul 2017 16:34), Max: 36.8 (22 Jul 2017 23:30)  T(F): 98.1 (23 Jul 2017 16:34), Max: 98.3 (22 Jul 2017 23:30)  HR: 78 (23 Jul 2017 16:34) (76 - 78)  BP: 114/48 (23 Jul 2017 16:34) (106/72 - 138/66)  BP(mean): --  RR: 16 (23 Jul 2017 16:34) (16 - 17)  SpO2: 100% (23 Jul 2017 16:34) (100% - 100%)    ________________________________________________  PHYSICAL EXAM:  GENERAL: NAD  HEENT: Normocephalic;  conjunctivae and sclerae clear; moist mucous membranes;   NECK : supple  CHEST/LUNG: Clear to auscultation bilaterally with good air entry   HEART: S1 S2  regular; no murmurs, gallops or rubs  ABDOMEN: Soft, Nontender, Nondistended; Bowel sounds present  EXTREMITIES: no cyanosis; no edema; no calf tenderness  SKIN: warm and dry; no rash  NERVOUS SYSTEM:  AAOx 1   _________________________________________________  LABS:                        11.0   6.7   )-----------( 338      ( 22 Jul 2017 06:47 )             33.6     07-22    139  |  104  |  18  ----------------------------<  271<H>  4.0   |  27  |  0.98    Ca    8.2<L>      22 Jul 2017 06:47  Phos  3.3     07-22  Mg     1.7     07-22          CAPILLARY BLOOD GLUCOSE  296 (23 Jul 2017 16:34)  226 (23 Jul 2017 11:40)  417 (23 Jul 2017 08:09)  265 (22 Jul 2017 21:11)            RADIOLOGY & ADDITIONAL TESTS:    Imaging Personally Reviewed:  YES/NO    Consultant(s) Notes Reviewed:   YES/ No    Care Discussed with Consultants :     Plan of care was discussed with patient and /or primary care giver; all questions and concerns were addressed and care was aligned with patient's wishes.

## 2017-07-23 NOTE — PROGRESS NOTE ADULT - SUBJECTIVE AND OBJECTIVE BOX
PGY 1 Note discussed with supervising resident and primary attending    Patient is a 72y old  Female who presents with a chief complaint of worsening dementia, weakness and not eating and drinking (20 Jul 2017 11:40)      INTERVAL HPI/OVERNIGHT EVENTS: offers no new complaints; current symptoms resolving    MEDICATIONS  (STANDING):  enoxaparin Injectable 40 milliGRAM(s) SubCutaneous daily  aspirin  chewable 81 milliGRAM(s) Oral daily  lisinopril 20 milliGRAM(s) Oral daily  atorvastatin 20 milliGRAM(s) Oral at bedtime  ticagrelor 90 milliGRAM(s) Oral two times a day  metoprolol succinate ER 25 milliGRAM(s) Oral daily  amLODIPine   Tablet 10 milliGRAM(s) Oral daily  memantine 5 milliGRAM(s) Oral at bedtime  cholecalciferol 1000 Unit(s) Oral daily  insulin lispro (HumaLOG) corrective regimen sliding scale   SubCutaneous three times a day before meals  mirtazapine 7.5 milliGRAM(s) Oral three times a day  insulin glargine Injectable (LANTUS) 37 Unit(s) SubCutaneous at bedtime  insulin lispro Injectable (HumaLOG) 6 Unit(s) SubCutaneous three times a day before meals  predniSONE   Tablet 5 milliGRAM(s) Oral daily    MEDICATIONS  (PRN):  haloperidol    Injectable 1 milliGRAM(s) IV Push every 12 hours PRN Agitation      __________________________________________________  REVIEW OF SYSTEMS:    CONSTITUTIONAL: No fever,   EYES: no acute visual disturbances  NECK: No pain or stiffness  RESPIRATORY: No cough; No shortness of breath  CARDIOVASCULAR: No chest pain, no palpitations  GASTROINTESTINAL: No pain. No nausea or vomiting; No diarrhea   NEUROLOGICAL: No headache or numbness, no tremors  MUSCULOSKELETAL: No joint pain, no muscle pain  GENITOURINARY: no dysuria, no frequency, no hesitancy  PSYCHIATRY: no depression , no anxiety  ALL OTHER  ROS negative        Vital Signs Last 24 Hrs  T(C): 36.7 (23 Jul 2017 16:34), Max: 36.8 (22 Jul 2017 23:30)  T(F): 98.1 (23 Jul 2017 16:34), Max: 98.3 (22 Jul 2017 23:30)  HR: 78 (23 Jul 2017 16:34) (76 - 78)  BP: 114/48 (23 Jul 2017 16:34) (106/72 - 138/66)  BP(mean): --  RR: 16 (23 Jul 2017 16:34) (16 - 17)  SpO2: 100% (23 Jul 2017 16:34) (100% - 100%)    ________________________________________________  PHYSICAL EXAM:  GENERAL: NAD  HEENT: Normocephalic;  conjunctivae and sclerae clear; moist mucous membranes;   NECK : supple  CHEST/LUNG: Clear to auscultation bilaterally with good air entry   HEART: S1 S2  regular; no murmurs, gallops or rubs  ABDOMEN: Soft, Nontender, Nondistended; Bowel sounds present  EXTREMITIES: no cyanosis; no edema; no calf tenderness  SKIN: warm and dry; no rash  NERVOUS SYSTEM:  Awake and alert; Oriented  to place, person and time ; no new deficits    _________________________________________________  LABS:                        11.0   6.7   )-----------( 338      ( 22 Jul 2017 06:47 )             33.6     07-22    139  |  104  |  18  ----------------------------<  271<H>  4.0   |  27  |  0.98    Ca    8.2<L>      22 Jul 2017 06:47  Phos  3.3     07-22  Mg     1.7     07-22          CAPILLARY BLOOD GLUCOSE  296 (23 Jul 2017 16:34)  226 (23 Jul 2017 11:40)  417 (23 Jul 2017 08:09)  265 (22 Jul 2017 21:11)            RADIOLOGY & ADDITIONAL TESTS:    Imaging Personally Reviewed:  YES/NO    Consultant(s) Notes Reviewed:   YES/ No    Care Discussed with Consultants :     Plan of care was discussed with patient and /or primary care giver; all questions and concerns were addressed and care was aligned with patient's wishes.

## 2017-07-23 NOTE — PROGRESS NOTE ADULT - SUBJECTIVE AND OBJECTIVE BOX
Patient is a 72y old  Female who presents with a chief complaint of worsening dementia, weakness and not eating and drinking (20 Jul 2017 11:40)      INTERVAL HPI/OVERNIGHT EVENTS:  Patient seen and examined at bedside, feeling ok, her sugars are uncontrolled as she has good appetite now with medications  she is also on prednisone as appetite stimulant and is contributing to her elevated sugars.  Spoke to daughter at length yesterday, she still wishes for long term placement    Allergies    No Known Allergies    Intolerances        REVIEW OF SYSTEMS:  CONSTITUTIONAL: No fever, weight loss, or fatigue  EYES: No eye pain, visual disturbances, or discharge  RESPIRATORY: No cough, wheezing or shortness of breath  CARDIOVASCULAR: No chest pain, feeling of heart beats  GASTROINTESTINAL: No abdominal or epigastric pain. No nausea, vomiting; No diarrhea or constipation.  GENITOURINARY: No dysuria, frequency, hematuria  NEUROLOGICAL: No headaches  MUSCULOSKELETAL: No joint pain  PSYCHIATRIC: No depression or anxiety  HEME/LYMPH: No easy bruising, or bleeding gums  ALLERGY AND IMMUNOLOGIC: No hives or eczema    Medications:  haloperidol    Injectable 1 milliGRAM(s) IV Push every 12 hours PRN Agitation  enoxaparin Injectable 40 milliGRAM(s) SubCutaneous daily  aspirin  chewable 81 milliGRAM(s) Oral daily  lisinopril 20 milliGRAM(s) Oral daily  atorvastatin 20 milliGRAM(s) Oral at bedtime  ticagrelor 90 milliGRAM(s) Oral two times a day  metoprolol succinate ER 25 milliGRAM(s) Oral daily  amLODIPine   Tablet 10 milliGRAM(s) Oral daily  memantine 5 milliGRAM(s) Oral at bedtime  cholecalciferol 1000 Unit(s) Oral daily  insulin lispro (HumaLOG) corrective regimen sliding scale   SubCutaneous three times a day before meals  mirtazapine 7.5 milliGRAM(s) Oral three times a day  insulin glargine Injectable (LANTUS) 37 Unit(s) SubCutaneous at bedtime  insulin lispro Injectable (HumaLOG) 6 Unit(s) SubCutaneous three times a day before meals  predniSONE   Tablet 5 milliGRAM(s) Oral daily      PHYSICAL EXAM:  Vital Signs Last 24 Hrs  T(C): 36.8 (23 Jul 2017 08:09), Max: 36.8 (22 Jul 2017 16:49)  T(F): 98.3 (23 Jul 2017 08:09), Max: 98.3 (22 Jul 2017 23:30)  HR: 78 (23 Jul 2017 08:09) (67 - 78)  BP: 106/72 (23 Jul 2017 08:09) (106/72 - 141/80)  BP(mean): --  RR: 16 (23 Jul 2017 08:09) (16 - 19)  SpO2: 100% (23 Jul 2017 08:09) (100% - 100%)    GENERAL: NAD  HEAD:  Atraumatic, Normocephalic  EYES: EOMI, PERRLA, conjunctiva and sclera clear  ENT: moist mucous membranes  NECK: Supple, No JVD, Normal thyroid  NERVOUS SYSTEM:  Alert & Oriented X3, Good concentration; Motor Strength 5/5 B/L upper and lower extremities; DTRs 2+ intact and symmetric  CHEST/LUNG: No rales, rhonchi, wheezing, or rubs  HEART: Regular rate and rhythm; No murmurs, rubs, or gallops  ABDOMEN: Soft, Nontender, Nondistended; Bowel sounds present  EXTREMITIES:  2+ Peripheral Pulses, No clubbing, cyanosis, or edema  SKIN: No rashes or lesions    LABS:                        11.0   6.7   )-----------( 338      ( 22 Jul 2017 06:47 )             33.6     07-22    139  |  104  |  18  ----------------------------<  271<H>  4.0   |  27  |  0.98    Ca    8.2<L>      22 Jul 2017 06:47  Phos  3.3     07-22  Mg     1.7     07-22    Culture & Sensitivity:   CAPILLARY BLOOD GLUCOSE  226 (23 Jul 2017 11:40)  417 (23 Jul 2017 08:09)  265 (22 Jul 2017 21:11)  449 (22 Jul 2017 16:51)  409 (22 Jul 2017 16:49)    07-22 @ 07:01  -  07-23 @ 07:00  --------------------------------------------------------  IN: 250 mL / OUT: 1 mL / NET: 249 mL    RADIOLOGY & ADDITIONAL TESTS:  Radiology testing independently reviewed:     Consultant(s) Notes Reviewed:  [ x] YES  [ ] NO    Care Discussed with Consultants/Other Providers [ x] YES  [ ] NO

## 2017-07-23 NOTE — CHART NOTE - NSCHARTNOTEFT_GEN_A_CORE
As per nurse patient did not eat dinner 2/2 to appetite lost and didn't receive a premeal dose of Insulin, FS at 8 p.m. 114 - normally Lantus nightly 24 U, nurse instructed to only give 12 U for tonight.
Discussed with patient's Daughter: Dora Graf at [phone], her brother, sister and her have the HCP, and they wish their mom to be DNR/DNI.  She will be here in am to sign any papers if needed.  As per her, her mom refused any NGT, feeding tube placement.  She is agreeable with Hospice. Will request Social work consult for hospice referral.
Upon Nutritional Assessment by the Registered Dietitian your patient was determined to meet criteria / has evidence of the following diagnosis/diagnoses:          [ ]  Mild Protein Calorie Malnutrition        [ ]  Moderate Protein Calorie Malnutrition        [ X ] Severe Protein Calorie Malnutrition        [ ] Unspecified Protein Calorie Malnutrition        [ X ] Underweight / BMI <19        [ ] Morbid Obesity / BMI > 40      Findings as based on:  •  Comprehensive nutrition assessment and consultation  •  Calorie counts (nutrient intake analysis)  •  Food acceptance and intake status from observations by staff  •  Follow up  •  Patient education  •  Intervention secondary to interdisciplinary rounds  •   concerns      Treatment:    The following diet has been recommended: change diet to Soft, Consistent CHO, Add Glucerna Shake 1can bid as medically feasible (440kcal, 20g protein)       PROVIDER Section:     By signing this assessment you are acknowledging and agree with the diagnosis/diagnoses assigned by the Registered Dietitian    Comments:
patient refusing IV access, unable to administer antibiotics.

## 2017-07-23 NOTE — PROGRESS NOTE ADULT - ASSESSMENT
HPI:  72 F from home, lives with daughter, with HHA 5hours per day 5 days a week PMH of  Dementia, Depression, DM, HTN, HLD, CAD x3 stents last stent in Aug 2016, Glaucoma, legally blind, brought in by Daughter  with worsening dementia, weakness and not eating and drinking sufficiently since May/2017. Pt is sedated due to agitation and poor co-operation currently, not oriented. As per daughter pt is refusing to take any of her meds since 2 months  as she is not eating or drinking much, Patient recent admission in May with the same compliant and this time daughter was asking for long term care placement. Daughter stated that patient has been mostly bedbound for last 6 months as she keep having recurrent falls and unsteady gait. She occasionally went into kitchen but stays in bed most of the time. Daughter stated she does not want PEG tube or any other kind of artificial nutrition. Patient was DNR/DNI on previous admission and daughter wanted to wait until morning until her sister came in to sign the form.   In ED, vitals were stable and pt is hypokalemic to 2.5 without no acute EKG changes and NO U wave. Refusing medication. Patient was sedated and potassium riders were given. (17 Jul 2017 22:51). Daughter wishes for long term placement.

## 2017-07-24 LAB
ANION GAP SERPL CALC-SCNC: 4 MMOL/L — LOW (ref 5–17)
BUN SERPL-MCNC: 38 MG/DL — HIGH (ref 7–18)
CALCIUM SERPL-MCNC: 8.7 MG/DL — SIGNIFICANT CHANGE UP (ref 8.4–10.5)
CHLORIDE SERPL-SCNC: 107 MMOL/L — SIGNIFICANT CHANGE UP (ref 96–108)
CO2 SERPL-SCNC: 29 MMOL/L — SIGNIFICANT CHANGE UP (ref 22–31)
CREAT SERPL-MCNC: 1 MG/DL — SIGNIFICANT CHANGE UP (ref 0.5–1.3)
GLUCOSE SERPL-MCNC: 131 MG/DL — HIGH (ref 70–99)
POTASSIUM SERPL-MCNC: 3.9 MMOL/L — SIGNIFICANT CHANGE UP (ref 3.5–5.3)
POTASSIUM SERPL-SCNC: 3.9 MMOL/L — SIGNIFICANT CHANGE UP (ref 3.5–5.3)
SODIUM SERPL-SCNC: 140 MMOL/L — SIGNIFICANT CHANGE UP (ref 135–145)

## 2017-07-24 PROCEDURE — 99233 SBSQ HOSP IP/OBS HIGH 50: CPT | Mod: GC

## 2017-07-24 RX ADMIN — Medication 1000 UNIT(S): at 12:56

## 2017-07-24 RX ADMIN — ENOXAPARIN SODIUM 40 MILLIGRAM(S): 100 INJECTION SUBCUTANEOUS at 17:17

## 2017-07-24 RX ADMIN — Medication 25 MILLIGRAM(S): at 05:37

## 2017-07-24 RX ADMIN — Medication 5 MILLIGRAM(S): at 05:37

## 2017-07-24 RX ADMIN — MIRTAZAPINE 7.5 MILLIGRAM(S): 45 TABLET, ORALLY DISINTEGRATING ORAL at 12:58

## 2017-07-24 RX ADMIN — Medication 6 UNIT(S): at 17:17

## 2017-07-24 RX ADMIN — Medication 81 MILLIGRAM(S): at 12:56

## 2017-07-24 RX ADMIN — MIRTAZAPINE 7.5 MILLIGRAM(S): 45 TABLET, ORALLY DISINTEGRATING ORAL at 21:34

## 2017-07-24 RX ADMIN — LISINOPRIL 20 MILLIGRAM(S): 2.5 TABLET ORAL at 05:37

## 2017-07-24 RX ADMIN — TICAGRELOR 90 MILLIGRAM(S): 90 TABLET ORAL at 05:37

## 2017-07-24 RX ADMIN — MEMANTINE HYDROCHLORIDE 5 MILLIGRAM(S): 10 TABLET ORAL at 21:34

## 2017-07-24 RX ADMIN — Medication 6 UNIT(S): at 12:57

## 2017-07-24 RX ADMIN — TICAGRELOR 90 MILLIGRAM(S): 90 TABLET ORAL at 17:18

## 2017-07-24 RX ADMIN — Medication 2: at 12:56

## 2017-07-24 RX ADMIN — MIRTAZAPINE 7.5 MILLIGRAM(S): 45 TABLET, ORALLY DISINTEGRATING ORAL at 05:37

## 2017-07-24 RX ADMIN — AMLODIPINE BESYLATE 10 MILLIGRAM(S): 2.5 TABLET ORAL at 05:37

## 2017-07-24 RX ADMIN — ATORVASTATIN CALCIUM 20 MILLIGRAM(S): 80 TABLET, FILM COATED ORAL at 21:32

## 2017-07-24 RX ADMIN — INSULIN GLARGINE 37 UNIT(S): 100 INJECTION, SOLUTION SUBCUTANEOUS at 21:32

## 2017-07-24 RX ADMIN — Medication 6 UNIT(S): at 08:29

## 2017-07-24 NOTE — PROGRESS NOTE ADULT - ATTENDING COMMENTS
Attending Medicine Covering Dr. Francisco Stein      Patient seen and examined; Agree with PGY1 A/P above with editing as needed. d/w PGY1 Dr. Thomson    Patient seen with PGY1; Eating well including solid food and liquids without any Dysphagia. Drank entire can of supplement and requested another one    Vitals: reviewed; stable      P/E: as above    labs: stable; 7/24/17    Plan:  Dementia  Failure to thrive with poor oral intake, Significantly improved  Protein calorie Malnutrition Previous note was wrongly edited;  See PGY1 Note from today for updates

## 2017-07-24 NOTE — PROGRESS NOTE ADULT - SUBJECTIVE AND OBJECTIVE BOX
PGY 1 Note discussed with supervising resident and primary attending    Patient is a 72y old  Female who presents with a chief complaint of worsening dementia, weakness and not eating and drinking (20 Jul 2017 11:40)      INTERVAL HPI/OVERNIGHT EVENTS: Patient was seen and examined at bed side. no new complaints. Patient is still deciding for PEG placemen for patient.  and  approached and working on case to send patient in long term placement.     MEDICATIONS  (STANDING):  enoxaparin Injectable 40 milliGRAM(s) SubCutaneous daily  aspirin  chewable 81 milliGRAM(s) Oral daily  lisinopril 20 milliGRAM(s) Oral daily  atorvastatin 20 milliGRAM(s) Oral at bedtime  ticagrelor 90 milliGRAM(s) Oral two times a day  metoprolol succinate ER 25 milliGRAM(s) Oral daily  amLODIPine   Tablet 10 milliGRAM(s) Oral daily  memantine 5 milliGRAM(s) Oral at bedtime  cholecalciferol 1000 Unit(s) Oral daily  insulin lispro (HumaLOG) corrective regimen sliding scale   SubCutaneous three times a day before meals  mirtazapine 7.5 milliGRAM(s) Oral three times a day  insulin glargine Injectable (LANTUS) 37 Unit(s) SubCutaneous at bedtime  insulin lispro Injectable (HumaLOG) 6 Unit(s) SubCutaneous three times a day before meals  predniSONE   Tablet 5 milliGRAM(s) Oral daily    MEDICATIONS  (PRN):  haloperidol    Injectable 1 milliGRAM(s) IntraMuscular once PRN Agitation      __________________________________________________  REVIEW OF SYSTEMS:    CONSTITUTIONAL: No fever,   EYES: no acute visual disturbances  NECK: No pain or stiffness  RESPIRATORY: No cough; No shortness of breath  CARDIOVASCULAR: No chest pain, no palpitations  GASTROINTESTINAL: No pain. No nausea or vomiting; No diarrhea   NEUROLOGICAL: No headache or numbness, no tremors  MUSCULOSKELETAL: No joint pain, no muscle pain  GENITOURINARY: no dysuria, no frequency, no hesitancy  PSYCHIATRY: no depression , no anxiety  ALL OTHER  ROS negative        Vital Signs Last 24 Hrs  T(C): 36.4 (24 Jul 2017 16:05), Max: 37.3 (23 Jul 2017 23:48)  T(F): 97.6 (24 Jul 2017 16:05), Max: 99.2 (23 Jul 2017 23:48)  HR: 80 (24 Jul 2017 16:05) (73 - 93)  BP: 104/45 (24 Jul 2017 16:05) (104/45 - 144/55)  BP(mean): --  RR: 18 (24 Jul 2017 16:05) (16 - 18)  SpO2: 100% (24 Jul 2017 16:05) (99% - 100%)    ________________________________________________  PHYSICAL EXAM:  GENERAL: NAD  HEENT: Normocephalic;  conjunctivae and sclerae clear; moist mucous membranes;   NECK : supple  CHEST/LUNG: Clear to auscultation bilaterally with good air entry   HEART: S1 S2  regular; no murmurs, gallops or rubs  ABDOMEN: Soft, Nontender, Nondistended; Bowel sounds present  EXTREMITIES: no cyanosis; no edema; no calf tenderness  SKIN: warm and dry; no rash  NERVOUS SYSTEM:  AAO x 1    _________________________________________________  LABS:    07-24    140  |  107  |  38<H>  ----------------------------<  131<H>  3.9   |  29  |  1.00    Ca    8.7      24 Jul 2017 08:24          CAPILLARY BLOOD GLUCOSE  148 (24 Jul 2017 16:05)  206 (24 Jul 2017 11:27)  116 (24 Jul 2017 08:11)  276 (23 Jul 2017 21:00)            RADIOLOGY & ADDITIONAL TESTS:    Imaging Personally Reviewed:  YES/NO    Consultant(s) Notes Reviewed:   YES/ No    Care Discussed with Consultants :     Plan of care was discussed with patient and /or primary care giver; all questions and concerns were addressed and care was aligned with patient's wishes. PGY 1 Note discussed with supervising resident and primary attending    Patient is a 72y old  Female who presents with a chief complaint of worsening dementia, weakness and not eating and drinking (20 Jul 2017 11:40)      INTERVAL HPI/OVERNIGHT EVENTS: Patient was seen and examined at bed side. no new complaints.   and  approached and working on case to send patient in long term placement.     MEDICATIONS  (STANDING):  enoxaparin Injectable 40 milliGRAM(s) SubCutaneous daily  aspirin  chewable 81 milliGRAM(s) Oral daily  lisinopril 20 milliGRAM(s) Oral daily  atorvastatin 20 milliGRAM(s) Oral at bedtime  ticagrelor 90 milliGRAM(s) Oral two times a day  metoprolol succinate ER 25 milliGRAM(s) Oral daily  amLODIPine   Tablet 10 milliGRAM(s) Oral daily  memantine 5 milliGRAM(s) Oral at bedtime  cholecalciferol 1000 Unit(s) Oral daily  insulin lispro (HumaLOG) corrective regimen sliding scale   SubCutaneous three times a day before meals  mirtazapine 7.5 milliGRAM(s) Oral three times a day  insulin glargine Injectable (LANTUS) 37 Unit(s) SubCutaneous at bedtime  insulin lispro Injectable (HumaLOG) 6 Unit(s) SubCutaneous three times a day before meals  predniSONE   Tablet 5 milliGRAM(s) Oral daily    MEDICATIONS  (PRN):  haloperidol    Injectable 1 milliGRAM(s) IntraMuscular once PRN Agitation      __________________________________________________  REVIEW OF SYSTEMS:    CONSTITUTIONAL: No fever,   EYES: no acute visual disturbances  NECK: No pain or stiffness  RESPIRATORY: No cough; No shortness of breath  CARDIOVASCULAR: No chest pain, no palpitations  GASTROINTESTINAL: No pain. No nausea or vomiting; No diarrhea   NEUROLOGICAL: No headache or numbness, no tremors  MUSCULOSKELETAL: No joint pain, no muscle pain  GENITOURINARY: no dysuria, no frequency, no hesitancy  PSYCHIATRY: no depression , no anxiety  ALL OTHER  ROS negative        Vital Signs Last 24 Hrs  T(C): 36.4 (24 Jul 2017 16:05), Max: 37.3 (23 Jul 2017 23:48)  T(F): 97.6 (24 Jul 2017 16:05), Max: 99.2 (23 Jul 2017 23:48)  HR: 80 (24 Jul 2017 16:05) (73 - 93)  BP: 104/45 (24 Jul 2017 16:05) (104/45 - 144/55)  RR: 18 (24 Jul 2017 16:05) (16 - 18)  SpO2: 100% (24 Jul 2017 16:05) (99% - 100%)    ________________________________________________  PHYSICAL EXAM:  GENERAL: NAD  HEENT: Normocephalic;  conjunctivae and sclerae clear; moist mucous membranes;   NECK : supple  CHEST/LUNG: Clear to auscultation bilaterally with good air entry   HEART: S1 S2  regular; no murmurs, gallops or rubs  ABDOMEN: Soft, Nontender, Nondistended; Bowel sounds present  EXTREMITIES: no cyanosis; no edema; no calf tenderness  SKIN: warm and dry; no rash  NERVOUS SYSTEM:  AAO x 2    _________________________________________________  LABS:    07-24    140  |  107  |  38<H>  ----------------------------<  131<H>  3.9   |  29  |  1.00    Ca    8.7      24 Jul 2017 08:24      CAPILLARY BLOOD GLUCOSE  148 (24 Jul 2017 16:05)  206 (24 Jul 2017 11:27)  116 (24 Jul 2017 08:11)  276 (23 Jul 2017 21:00)            RADIOLOGY & ADDITIONAL TESTS:    Imaging Personally Reviewed:  YES/NO    Consultant(s) Notes Reviewed:   YES/ No    Care Discussed with Consultants :     Plan of care was discussed with patient and /or primary care giver; all questions and concerns were addressed and care was aligned with patient's wishes.

## 2017-07-24 NOTE — PROGRESS NOTE ADULT - SUBJECTIVE AND OBJECTIVE BOX
Patient is a 72y old  Female who presents with a chief complaint of worsening dementia, weakness and not eating and drinking (20 Jul 2017 11:40)      INTERVAL HPI/OVERNIGHT EVENTS:  patient seen and examined at bedside, stable and awaiting long term placement    Allergies    No Known Allergies    Intolerances        REVIEW OF SYSTEMS:  CONSTITUTIONAL: No fever, weight loss, or fatigue  EYES: No eye pain, visual disturbances, or discharge  RESPIRATORY: No cough, wheezing or shortness of breath  CARDIOVASCULAR: No chest pain, feeling of heart beats  GASTROINTESTINAL: No abdominal or epigastric pain. No nausea, vomiting; No diarrhea or constipation.  GENITOURINARY: No dysuria, frequency, hematuria  NEUROLOGICAL: No headaches  MUSCULOSKELETAL: No joint pain  PSYCHIATRIC: No depression or anxiety  HEME/LYMPH: No easy bruising, or bleeding gums  ALLERGY AND IMMUNOLOGIC: No hives or eczema    Medications:  enoxaparin Injectable 40 milliGRAM(s) SubCutaneous daily  aspirin  chewable 81 milliGRAM(s) Oral daily  lisinopril 20 milliGRAM(s) Oral daily  atorvastatin 20 milliGRAM(s) Oral at bedtime  ticagrelor 90 milliGRAM(s) Oral two times a day  metoprolol succinate ER 25 milliGRAM(s) Oral daily  amLODIPine   Tablet 10 milliGRAM(s) Oral daily  memantine 5 milliGRAM(s) Oral at bedtime  cholecalciferol 1000 Unit(s) Oral daily  insulin lispro (HumaLOG) corrective regimen sliding scale   SubCutaneous three times a day before meals  mirtazapine 7.5 milliGRAM(s) Oral three times a day  insulin glargine Injectable (LANTUS) 37 Unit(s) SubCutaneous at bedtime  insulin lispro Injectable (HumaLOG) 6 Unit(s) SubCutaneous three times a day before meals  predniSONE   Tablet 5 milliGRAM(s) Oral daily  haloperidol    Injectable 1 milliGRAM(s) IntraMuscular once PRN Agitation      PHYSICAL EXAM:  Vital Signs Last 24 Hrs  T(C): 37.3 (23 Jul 2017 23:48), Max: 37.3 (23 Jul 2017 23:48)  T(F): 99.2 (23 Jul 2017 23:48), Max: 99.2 (23 Jul 2017 23:48)  HR: 73 (24 Jul 2017 05:31) (73 - 93)  BP: 144/55 (24 Jul 2017 05:31) (114/48 - 144/55)  BP(mean): --  RR: 16 (23 Jul 2017 23:48) (16 - 16)  SpO2: 99% (23 Jul 2017 23:48) (99% - 100%)    GENERAL: NAD  HEAD:  Atraumatic, Normocephalic  EYES: EOMI, PERRLA, conjunctiva and sclera clear  ENT: moist mucous membranes  NECK: Supple, No JVD, Normal thyroid  NERVOUS SYSTEM:  Alert & Oriented X3, Good concentration; Motor Strength 5/5 B/L upper and lower extremities; DTRs 2+ intact and symmetric  CHEST/LUNG: No rales, rhonchi, wheezing, or rubs  HEART: Regular rate and rhythm; No murmurs, rubs, or gallops  ABDOMEN: Soft, Nontender, Nondistended; Bowel sounds present  EXTREMITIES:  2+ Peripheral Pulses, No clubbing, cyanosis, or edema  SKIN: No rashes or lesions    LABS:    07-24    140  |  107  |  38<H>  ----------------------------<  131<H>  3.9   |  29  |  1.00    Ca    8.7      24 Jul 2017 08:24    Culture & Sensitivity: CAPILLARY BLOOD GLUCOSE  206 (24 Jul 2017 11:27)  116 (24 Jul 2017 08:11)  276 (23 Jul 2017 21:00)  296 (23 Jul 2017 16:34)    RADIOLOGY & ADDITIONAL TESTS:  Radiology testing independently reviewed:     Consultant(s) Notes Reviewed:  [ x] YES  [ ] NO    Care Discussed with Consultants/Other Providers [ x] YES  [ ] NO

## 2017-07-25 LAB
ANION GAP SERPL CALC-SCNC: 7 MMOL/L — SIGNIFICANT CHANGE UP (ref 5–17)
BUN SERPL-MCNC: 32 MG/DL — HIGH (ref 7–18)
CALCIUM SERPL-MCNC: 8.7 MG/DL — SIGNIFICANT CHANGE UP (ref 8.4–10.5)
CHLORIDE SERPL-SCNC: 109 MMOL/L — HIGH (ref 96–108)
CO2 SERPL-SCNC: 25 MMOL/L — SIGNIFICANT CHANGE UP (ref 22–31)
CREAT SERPL-MCNC: 0.98 MG/DL — SIGNIFICANT CHANGE UP (ref 0.5–1.3)
GLUCOSE SERPL-MCNC: 98 MG/DL — SIGNIFICANT CHANGE UP (ref 70–99)
POTASSIUM SERPL-MCNC: 3.8 MMOL/L — SIGNIFICANT CHANGE UP (ref 3.5–5.3)
POTASSIUM SERPL-SCNC: 3.8 MMOL/L — SIGNIFICANT CHANGE UP (ref 3.5–5.3)
SODIUM SERPL-SCNC: 141 MMOL/L — SIGNIFICANT CHANGE UP (ref 135–145)

## 2017-07-25 PROCEDURE — 99232 SBSQ HOSP IP/OBS MODERATE 35: CPT | Mod: GC

## 2017-07-25 RX ORDER — MIRTAZAPINE 45 MG/1
7.5 TABLET, ORALLY DISINTEGRATING ORAL ONCE
Qty: 0 | Refills: 0 | Status: DISCONTINUED | OUTPATIENT
Start: 2017-07-25 | End: 2017-07-26

## 2017-07-25 RX ADMIN — Medication 6 UNIT(S): at 18:09

## 2017-07-25 RX ADMIN — Medication 5 MILLIGRAM(S): at 06:10

## 2017-07-25 RX ADMIN — LISINOPRIL 20 MILLIGRAM(S): 2.5 TABLET ORAL at 06:10

## 2017-07-25 RX ADMIN — ATORVASTATIN CALCIUM 20 MILLIGRAM(S): 80 TABLET, FILM COATED ORAL at 21:29

## 2017-07-25 RX ADMIN — Medication 81 MILLIGRAM(S): at 11:54

## 2017-07-25 RX ADMIN — Medication 25 MILLIGRAM(S): at 06:10

## 2017-07-25 RX ADMIN — Medication 1000 UNIT(S): at 11:54

## 2017-07-25 RX ADMIN — TICAGRELOR 90 MILLIGRAM(S): 90 TABLET ORAL at 18:08

## 2017-07-25 RX ADMIN — AMLODIPINE BESYLATE 10 MILLIGRAM(S): 2.5 TABLET ORAL at 06:10

## 2017-07-25 RX ADMIN — MEMANTINE HYDROCHLORIDE 5 MILLIGRAM(S): 10 TABLET ORAL at 21:29

## 2017-07-25 RX ADMIN — Medication 6 UNIT(S): at 09:03

## 2017-07-25 RX ADMIN — Medication 1: at 09:03

## 2017-07-25 RX ADMIN — MIRTAZAPINE 7.5 MILLIGRAM(S): 45 TABLET, ORALLY DISINTEGRATING ORAL at 06:10

## 2017-07-25 RX ADMIN — Medication 5: at 18:08

## 2017-07-25 RX ADMIN — TICAGRELOR 90 MILLIGRAM(S): 90 TABLET ORAL at 06:10

## 2017-07-25 RX ADMIN — INSULIN GLARGINE 37 UNIT(S): 100 INJECTION, SOLUTION SUBCUTANEOUS at 21:29

## 2017-07-25 RX ADMIN — ENOXAPARIN SODIUM 40 MILLIGRAM(S): 100 INJECTION SUBCUTANEOUS at 11:54

## 2017-07-25 RX ADMIN — MIRTAZAPINE 7.5 MILLIGRAM(S): 45 TABLET, ORALLY DISINTEGRATING ORAL at 14:50

## 2017-07-25 NOTE — PROGRESS NOTE ADULT - PROBLEM SELECTOR PLAN 2
continue statin, hyperlipidemia likely secondary to medication non compliance from underlying dementia and medication refusal. continue statin, hyperlipidemia likely secondary to medication non compliance from underlying dementia.

## 2017-07-25 NOTE — PROGRESS NOTE ADULT - ATTENDING COMMENTS
Attending Medicine Covering Dr. Francisco Stein      Patient seen and examined with PGY1 Dr. Thomson;  Agree with PGY1 A/P above with editing as needed. d/w PGY1 plan of care  Patient seen with PGY1; Eating well including solid food and liquids without any Dysphagia. Drank entire can of supplement and requested another one    Vitals: reviewed; stable      P/E: as above    labs: stable; 7/24/17    D/D:  Dementia  Failure to thrive with poor oral intake, Significantly improved  Protein calorie Malnutrition  Hx CAD, ?? Stent  Diabetes Mellitus    Plan:  Continue supportive Care  case management/ SW follow up for Long term placement  Will get updated medication list from her outpatient pharmacy about correct dosage of Namenda  Continue Brilinta, Insulin Lantus plus Humalog  Continue Amlodipine, Lisinopril, Toprol XL    d/w CM/ RN; d/w PGY1 Dr. Thomson

## 2017-07-25 NOTE — PROGRESS NOTE ADULT - SUBJECTIVE AND OBJECTIVE BOX
PGY 1 Note discussed with supervising resident and primary attending    Patient is a 72y old  Female who presents with a chief complaint of worsening dementia, weakness and not eating and drinking (20 Jul 2017 11:40)      INTERVAL HPI/OVERNIGHT EVENTS: patient was seen and examined at the bed side. Patient was eating her break fast. She said he wants more food. She had no pain and now new complaints. Waiting for long term placement.     MEDICATIONS  (STANDING):  enoxaparin Injectable 40 milliGRAM(s) SubCutaneous daily  aspirin  chewable 81 milliGRAM(s) Oral daily  lisinopril 20 milliGRAM(s) Oral daily  atorvastatin 20 milliGRAM(s) Oral at bedtime  ticagrelor 90 milliGRAM(s) Oral two times a day  metoprolol succinate ER 25 milliGRAM(s) Oral daily  amLODIPine   Tablet 10 milliGRAM(s) Oral daily  memantine 5 milliGRAM(s) Oral at bedtime  cholecalciferol 1000 Unit(s) Oral daily  insulin lispro (HumaLOG) corrective regimen sliding scale   SubCutaneous three times a day before meals  insulin glargine Injectable (LANTUS) 37 Unit(s) SubCutaneous at bedtime  insulin lispro Injectable (HumaLOG) 6 Unit(s) SubCutaneous three times a day before meals  predniSONE   Tablet 5 milliGRAM(s) Oral daily    MEDICATIONS  (PRN):  haloperidol    Injectable 1 milliGRAM(s) IntraMuscular once PRN Agitation      __________________________________________________  REVIEW OF SYSTEMS:    CONSTITUTIONAL: No fever,   EYES: no acute visual disturbances  NECK: No pain or stiffness  RESPIRATORY: No cough; No shortness of breath  CARDIOVASCULAR: No chest pain, no palpitations  GASTROINTESTINAL: No pain. No nausea or vomiting; No diarrhea   NEUROLOGICAL: No headache or numbness, no tremors  MUSCULOSKELETAL: No joint pain, no muscle pain  GENITOURINARY: no dysuria, no frequency, no hesitancy  PSYCHIATRY: no depression , no anxiety  ALL OTHER  ROS negative        Vital Signs Last 24 Hrs  T(C): 36.7 (25 Jul 2017 15:57), Max: 36.7 (25 Jul 2017 15:57)  T(F): 98.1 (25 Jul 2017 15:57), Max: 98.1 (25 Jul 2017 15:57)  HR: 92 (25 Jul 2017 15:57) (86 - 92)  BP: 103/43 (25 Jul 2017 15:57) (103/43 - 169/57)  BP(mean): --  RR: 16 (25 Jul 2017 15:57) (16 - 16)  SpO2: 98% (25 Jul 2017 15:57) (98% - 100%)    ________________________________________________  PHYSICAL EXAM:  GENERAL: NAD  HEENT: Normocephalic;  conjunctivae and sclerae clear; moist mucous membranes;   NECK : supple  CHEST/LUNG: Clear to auscultation bilaterally with good air entry   HEART: S1 S2  regular; no murmurs, gallops or rubs  ABDOMEN: Soft, Nontender, Nondistended; Bowel sounds present  EXTREMITIES: no cyanosis; no edema; no calf tenderness  SKIN: warm and dry; no rash  NERVOUS SYSTEM: AAOx 2.     _________________________________________________  LABS:    07-25    141  |  109<H>  |  32<H>  ----------------------------<  98  3.8   |  25  |  0.98    Ca    8.7      25 Jul 2017 06:26          CAPILLARY BLOOD GLUCOSE  376 (25 Jul 2017 15:57)  118 (25 Jul 2017 11:16)  160 (25 Jul 2017 08:16)  202 (24 Jul 2017 21:02)            RADIOLOGY & ADDITIONAL TESTS:    Imaging Personally Reviewed:  YES/NO    Consultant(s) Notes Reviewed:   YES/ No    Care Discussed with Consultants :     Plan of care was discussed with patient and /or primary care giver; all questions and concerns were addressed and care was aligned with patient's wishes. PGY 1 Note discussed with supervising resident and primary attending    Patient is a 72y old  Female who presents with a chief complaint of worsening dementia, weakness and not eating and drinking (20 Jul 2017 11:40)      INTERVAL HPI/OVERNIGHT EVENTS: patient was seen and examined at the bed side. Patient was eating her break fast. She said he wants more food. She had no pain and now new complaints. Waiting for long term placement.     MEDICATIONS  (STANDING):  enoxaparin Injectable 40 milliGRAM(s) SubCutaneous daily  aspirin  chewable 81 milliGRAM(s) Oral daily  lisinopril 20 milliGRAM(s) Oral daily  atorvastatin 20 milliGRAM(s) Oral at bedtime  ticagrelor 90 milliGRAM(s) Oral two times a day  metoprolol succinate ER 25 milliGRAM(s) Oral daily  amLODIPine   Tablet 10 milliGRAM(s) Oral daily  memantine 5 milliGRAM(s) Oral at bedtime  cholecalciferol 1000 Unit(s) Oral daily  insulin lispro (HumaLOG) corrective regimen sliding scale   SubCutaneous three times a day before meals  insulin glargine Injectable (LANTUS) 37 Unit(s) SubCutaneous at bedtime  insulin lispro Injectable (HumaLOG) 6 Unit(s) SubCutaneous three times a day before meals  predniSONE   Tablet 5 milliGRAM(s) Oral daily    MEDICATIONS  (PRN):  haloperidol    Injectable 1 milliGRAM(s) IntraMuscular once PRN Agitation      __________________________________________________  REVIEW OF SYSTEMS:    CONSTITUTIONAL: No fever,   EYES: no acute visual disturbances  NECK: No pain or stiffness  RESPIRATORY: No cough; No shortness of breath  CARDIOVASCULAR: No chest pain, no palpitations  GASTROINTESTINAL: No pain. No nausea or vomiting; No diarrhea   NEUROLOGICAL: No headache or numbness, no tremors  MUSCULOSKELETAL: No joint pain, no muscle pain  GENITOURINARY: no dysuria, no frequency, no hesitancy  PSYCHIATRY: no depression , no anxiety  ALL OTHER  ROS negative        Vital Signs Last 24 Hrs  T(C): 36.7 (25 Jul 2017 15:57), Max: 36.7 (25 Jul 2017 15:57)  T(F): 98.1 (25 Jul 2017 15:57), Max: 98.1 (25 Jul 2017 15:57)  HR: 92 (25 Jul 2017 15:57) (86 - 92)  BP: 103/43 (25 Jul 2017 15:57) (103/43 - 169/57)  BP(mean): --  RR: 16 (25 Jul 2017 15:57) (16 - 16)  SpO2: 98% (25 Jul 2017 15:57) (98% - 100%)    ________________________________________________  PHYSICAL EXAM:  GENERAL: NAD  CHEST/LUNG: Clear to auscultation bilaterally with good air entry   HEART: S1 S2  regular; no murmurs, gallops or rubs  ABDOMEN: Soft, Nontender, Nondistended; Bowel sounds present  EXTREMITIES: no cyanosis; no edema; no calf tenderness  NERVOUS SYSTEM: AAOx 2.     _________________________________________________  LABS:    07-25    141  |  109<H>  |  32<H>  ----------------------------<  98  3.8   |  25  |  0.98    Ca    8.7      25 Jul 2017 06:26          CAPILLARY BLOOD GLUCOSE  376 (25 Jul 2017 15:57)            Plan of care was discussed with patient and /or primary care giver; all questions and concerns were addressed and care was aligned with patient's wishes.

## 2017-07-25 NOTE — PROGRESS NOTE ADULT - PROBLEM SELECTOR PLAN 3
much improved on meds  will decrease prednisone to 5 mg  Nutritionist consult will follow up much improved on meds  will decrease prednisone to 5 mg.  Nutritionist assessed patient for protein calorie mal nutrition. they suggested   Soft, Consistent CHO, Add Glucerna Shake 1can bid as medically feasible (440kcal, 20g protein)

## 2017-07-25 NOTE — PROGRESS NOTE ADULT - PROBLEM SELECTOR PLAN 1
namenda to 10 mg nightly namenda to 5mg nightly.  called pharmacy to confirm home dose of nemenda.  Patient stable on current dose.   couldn't reach pharmacy. will try again in am

## 2017-07-25 NOTE — PROGRESS NOTE ADULT - PROBLEM SELECTOR PLAN 6
Patients looks better.   Continue remeron Patients looks better.   Continue remeron 7.5mg once a day.

## 2017-07-26 PROCEDURE — 99233 SBSQ HOSP IP/OBS HIGH 50: CPT | Mod: GC

## 2017-07-26 RX ORDER — MIRTAZAPINE 45 MG/1
15 TABLET, ORALLY DISINTEGRATING ORAL AT BEDTIME
Qty: 0 | Refills: 0 | Status: DISCONTINUED | OUTPATIENT
Start: 2017-07-26 | End: 2017-08-02

## 2017-07-26 RX ORDER — MEMANTINE HYDROCHLORIDE 10 MG/1
1 TABLET ORAL
Qty: 60 | Refills: 0
Start: 2017-07-26 | End: 2017-08-25

## 2017-07-26 RX ADMIN — ENOXAPARIN SODIUM 40 MILLIGRAM(S): 100 INJECTION SUBCUTANEOUS at 12:34

## 2017-07-26 RX ADMIN — INSULIN GLARGINE 37 UNIT(S): 100 INJECTION, SOLUTION SUBCUTANEOUS at 22:18

## 2017-07-26 RX ADMIN — MIRTAZAPINE 15 MILLIGRAM(S): 45 TABLET, ORALLY DISINTEGRATING ORAL at 22:18

## 2017-07-26 RX ADMIN — ATORVASTATIN CALCIUM 20 MILLIGRAM(S): 80 TABLET, FILM COATED ORAL at 22:18

## 2017-07-26 RX ADMIN — Medication 25 MILLIGRAM(S): at 05:55

## 2017-07-26 RX ADMIN — Medication 6 UNIT(S): at 12:35

## 2017-07-26 RX ADMIN — Medication 6 UNIT(S): at 08:34

## 2017-07-26 RX ADMIN — Medication 5 MILLIGRAM(S): at 05:55

## 2017-07-26 RX ADMIN — MEMANTINE HYDROCHLORIDE 5 MILLIGRAM(S): 10 TABLET ORAL at 22:18

## 2017-07-26 RX ADMIN — LISINOPRIL 20 MILLIGRAM(S): 2.5 TABLET ORAL at 05:55

## 2017-07-26 RX ADMIN — Medication 81 MILLIGRAM(S): at 12:34

## 2017-07-26 RX ADMIN — TICAGRELOR 90 MILLIGRAM(S): 90 TABLET ORAL at 05:55

## 2017-07-26 RX ADMIN — Medication 1: at 12:35

## 2017-07-26 RX ADMIN — AMLODIPINE BESYLATE 10 MILLIGRAM(S): 2.5 TABLET ORAL at 05:55

## 2017-07-26 RX ADMIN — Medication 1000 UNIT(S): at 12:34

## 2017-07-26 NOTE — PROGRESS NOTE ADULT - SUBJECTIVE AND OBJECTIVE BOX
Patient is a 72y old  Female who presents with a chief complaint of worsening dementia, weakness and not eating and drinking (20 Jul 2017 11:40)    INTERVAL HPI/OVERNIGHT EVENTS:  Patient seen and examined at bedside, feeling ok, nursing reporting patient has high appetite and eats >2 trays for each meal.  Sugars are trending high  Patient states she feels hungry.    Allergies    No Known Allergies    Intolerances    REVIEW OF SYSTEMS:  Unable to obtain due to underlying dementia.    Medications:  enoxaparin Injectable 40 milliGRAM(s) SubCutaneous daily  aspirin  chewable 81 milliGRAM(s) Oral daily  lisinopril 20 milliGRAM(s) Oral daily  atorvastatin 20 milliGRAM(s) Oral at bedtime  ticagrelor 90 milliGRAM(s) Oral two times a day  metoprolol succinate ER 25 milliGRAM(s) Oral daily  amLODIPine   Tablet 10 milliGRAM(s) Oral daily  memantine 5 milliGRAM(s) Oral at bedtime  cholecalciferol 1000 Unit(s) Oral daily  insulin lispro (HumaLOG) corrective regimen sliding scale   SubCutaneous three times a day before meals  insulin glargine Injectable (LANTUS) 37 Unit(s) SubCutaneous at bedtime  insulin lispro Injectable (HumaLOG) 6 Unit(s) SubCutaneous three times a day before meals  haloperidol    Injectable 1 milliGRAM(s) IntraMuscular once PRN Agitation  mirtazapine 15 milliGRAM(s) Oral at bedtime      PHYSICAL EXAM:  Vital Signs Last 24 Hrs  T(C): 36.2 (26 Jul 2017 08:24), Max: 36.7 (25 Jul 2017 15:57)  T(F): 97.2 (26 Jul 2017 08:24), Max: 98.1 (25 Jul 2017 15:57)  HR: 71 (26 Jul 2017 08:24) (71 - 92)  BP: 115/41 (26 Jul 2017 08:24) (103/43 - 135/47)  BP(mean): --  RR: 17 (26 Jul 2017 08:24) (16 - 17)  SpO2: 100% (26 Jul 2017 08:24) (98% - 100%)    GENERAL: NAD  HEAD:  Atraumatic, Normocephalic  EYES: EOMI, PERRLA, conjunctiva and sclera clear  ENT: moist mucous membranes  NECK: Supple, No JVD, Normal thyroid  NERVOUS SYSTEM:  Alert & Oriented X3, Good concentration; Motor Strength 5/5 B/L upper and lower extremities; DTRs 2+ intact and symmetric  CHEST/LUNG: No rales, rhonchi, wheezing, or rubs  HEART: Regular rate and rhythm; No murmurs, rubs, or gallops  ABDOMEN: Soft, Nontender, Nondistended; Bowel sounds present  EXTREMITIES:  2+ Peripheral Pulses, No clubbing, cyanosis, or edema  SKIN: No rashes or lesions    LABS:    07-25    141  |  109<H>  |  32<H>  ----------------------------<  98  3.8   |  25  |  0.98    Ca    8.7      25 Jul 2017 06:26    Culture & Sensitivity:   CAPILLARY BLOOD GLUCOSE  131 (26 Jul 2017 08:24)  235 (25 Jul 2017 21:26)  376 (25 Jul 2017 15:57)      RADIOLOGY & ADDITIONAL TESTS:  Radiology testing independently reviewed:     Consultant(s) Notes Reviewed:  [ x] YES  [ ] NO    Care Discussed with Consultants/Other Providers [ x] YES  [ ] NO

## 2017-07-27 PROCEDURE — 99232 SBSQ HOSP IP/OBS MODERATE 35: CPT | Mod: GC

## 2017-07-27 RX ORDER — INSULIN LISPRO 100/ML
4 VIAL (ML) SUBCUTANEOUS ONCE
Qty: 0 | Refills: 0 | Status: COMPLETED | OUTPATIENT
Start: 2017-07-27 | End: 2017-07-27

## 2017-07-27 RX ADMIN — Medication 6 UNIT(S): at 13:49

## 2017-07-27 RX ADMIN — ENOXAPARIN SODIUM 40 MILLIGRAM(S): 100 INJECTION SUBCUTANEOUS at 13:50

## 2017-07-27 RX ADMIN — INSULIN GLARGINE 37 UNIT(S): 100 INJECTION, SOLUTION SUBCUTANEOUS at 21:37

## 2017-07-27 RX ADMIN — Medication 6 UNIT(S): at 18:14

## 2017-07-27 RX ADMIN — ATORVASTATIN CALCIUM 20 MILLIGRAM(S): 80 TABLET, FILM COATED ORAL at 21:37

## 2017-07-27 RX ADMIN — TICAGRELOR 90 MILLIGRAM(S): 90 TABLET ORAL at 18:13

## 2017-07-27 RX ADMIN — Medication 4 UNIT(S): at 00:11

## 2017-07-27 RX ADMIN — Medication 81 MILLIGRAM(S): at 13:49

## 2017-07-27 RX ADMIN — MIRTAZAPINE 15 MILLIGRAM(S): 45 TABLET, ORALLY DISINTEGRATING ORAL at 21:37

## 2017-07-27 RX ADMIN — Medication 1000 UNIT(S): at 13:49

## 2017-07-27 RX ADMIN — Medication 2: at 13:49

## 2017-07-27 RX ADMIN — HALOPERIDOL DECANOATE 1 MILLIGRAM(S): 100 INJECTION INTRAMUSCULAR at 06:07

## 2017-07-27 RX ADMIN — MEMANTINE HYDROCHLORIDE 5 MILLIGRAM(S): 10 TABLET ORAL at 21:37

## 2017-07-27 NOTE — PROGRESS NOTE ADULT - PROBLEM SELECTOR PLAN 1
namenda to 5mg nightly.  called pharmacy to confirm home dose of nemenda.  Patient stable on current dose.   waiting for long term placement.

## 2017-07-27 NOTE — PROGRESS NOTE ADULT - ASSESSMENT
Patient was seen and examined at bed side. patient doing better today. eating well. Patient waiting for long term placement. Patient has been accepted for long term placement at [facility].  Spoke with patient's daughter, Dora regarding above acceptance who reported that she needed to see facility first before deciding on placement  there. Daughter reported that she will go to [facility] this afternoon for a tour.

## 2017-07-27 NOTE — PROGRESS NOTE ADULT - SUBJECTIVE AND OBJECTIVE BOX
Patient is a 72y old  Female who presents with a chief complaint of worsening dementia, weakness and not eating and drinking (20 Jul 2017 11:40)      INTERVAL HPI/OVERNIGHT EVENTS:  Patient seen and examined at bedside, looks calm,   Yesterday, patient was discharged to Long term rehab facility but she got so agitated when she arrived there, to the point, facility had to return her back.  Patient is known to be demented and had similar episode of agitation when she initially arrived to the hospital.    Allergies    No Known Allergies    Intolerances        REVIEW OF SYSTEMS:  Unable to obtain due to underlying dementia.    ALLERGY AND IMMUNOLOGIC: No hives or eczema    Medications:  enoxaparin Injectable 40 milliGRAM(s) SubCutaneous daily  aspirin  chewable 81 milliGRAM(s) Oral daily  lisinopril 20 milliGRAM(s) Oral daily  atorvastatin 20 milliGRAM(s) Oral at bedtime  ticagrelor 90 milliGRAM(s) Oral two times a day  metoprolol succinate ER 25 milliGRAM(s) Oral daily  amLODIPine   Tablet 10 milliGRAM(s) Oral daily  memantine 5 milliGRAM(s) Oral at bedtime  cholecalciferol 1000 Unit(s) Oral daily  insulin lispro (HumaLOG) corrective regimen sliding scale   SubCutaneous three times a day before meals  insulin glargine Injectable (LANTUS) 37 Unit(s) SubCutaneous at bedtime  insulin lispro Injectable (HumaLOG) 6 Unit(s) SubCutaneous three times a day before meals  predniSONE   Tablet 2.5 milliGRAM(s) Oral daily  mirtazapine 15 milliGRAM(s) Oral at bedtime      PHYSICAL EXAM:  Vital Signs Last 24 Hrs  T(C): 36.7 (27 Jul 2017 15:21), Max: 37.1 (27 Jul 2017 00:08)  T(F): 98 (27 Jul 2017 15:21), Max: 98.7 (27 Jul 2017 00:08)  HR: 92 (27 Jul 2017 15:21) (84 - 99)  BP: 113/63 (27 Jul 2017 15:21) (113/63 - 166/58)  BP(mean): --  RR: 16 (27 Jul 2017 15:21) (16 - 16)  SpO2: 100% (27 Jul 2017 15:21) (99% - 100%)    GENERAL: NAD  HEAD:  Atraumatic, Normocephalic  EYES: EOMI, PERRLA, conjunctiva and sclera clear  ENT: moist mucous membranes  NECK: Supple, No JVD, Normal thyroid  NERVOUS SYSTEM:  Alert & Oriented X3, Good concentration; Motor Strength 5/5 B/L upper and lower extremities; DTRs 2+ intact and symmetric  CHEST/LUNG: No rales, rhonchi, wheezing, or rubs  HEART: Regular rate and rhythm; No murmurs, rubs, or gallops  ABDOMEN: Soft, Nontender, Nondistended; Bowel sounds present  EXTREMITIES:  2+ Peripheral Pulses, No clubbing, cyanosis, or edema  SKIN: No rashes or lesions    LABS:      Culture & Sensitivity:   CAPILLARY BLOOD GLUCOSE  217 (27 Jul 2017 13:43)  163 (27 Jul 2017 11:48)  77 (27 Jul 2017 08:37)  353 (26 Jul 2017 20:50)            RADIOLOGY & ADDITIONAL TESTS:  Radiology testing independently reviewed:     Consultant(s) Notes Reviewed:  [ x] YES  [ ] NO    Care Discussed with Consultants/Other Providers [ x] YES  [ ] NO

## 2017-07-27 NOTE — PROGRESS NOTE ADULT - PROBLEM SELECTOR PLAN 3
much improved on meds  will decrease prednisone to 5 mg.  Nutritionist assessed patient for protein calorie mal nutrition. they suggested   Soft, Consistent CHO, Add Glucerna Shake 1can bid as medically feasible (440kcal, 20g protein)

## 2017-07-27 NOTE — PROGRESS NOTE ADULT - SUBJECTIVE AND OBJECTIVE BOX
PGY 1 Note discussed with supervising resident and primary attending    Patient is a 72y old  Female who presents with a chief complaint of worsening dementia, weakness and not eating and drinking (20 Jul 2017 11:40)      INTERVAL HPI/OVERNIGHT EVENTS: offers no new complaints; current symptoms resolving. Patient wasn't accepted at the facility and returned back as per facility patient was agitated when reached facility. Patient comfortable here. Patient was evaluated by Psychiatry who once again declared that patient is demented.  Will try to arrange long term placement .  to follow up.     MEDICATIONS  (STANDING):  enoxaparin Injectable 40 milliGRAM(s) SubCutaneous daily  aspirin  chewable 81 milliGRAM(s) Oral daily  lisinopril 20 milliGRAM(s) Oral daily  atorvastatin 20 milliGRAM(s) Oral at bedtime  ticagrelor 90 milliGRAM(s) Oral two times a day  metoprolol succinate ER 25 milliGRAM(s) Oral daily  amLODIPine   Tablet 10 milliGRAM(s) Oral daily  memantine 5 milliGRAM(s) Oral at bedtime  cholecalciferol 1000 Unit(s) Oral daily  insulin lispro (HumaLOG) corrective regimen sliding scale   SubCutaneous three times a day before meals  insulin glargine Injectable (LANTUS) 37 Unit(s) SubCutaneous at bedtime  insulin lispro Injectable (HumaLOG) 6 Unit(s) SubCutaneous three times a day before meals  predniSONE   Tablet 2.5 milliGRAM(s) Oral daily  mirtazapine 15 milliGRAM(s) Oral at bedtime    MEDICATIONS  (PRN):      __________________________________________________  REVIEW OF SYSTEMS:    CONSTITUTIONAL: No fever,   EYES: no acute visual disturbances  NECK: No pain or stiffness  RESPIRATORY: No cough; No shortness of breath  CARDIOVASCULAR: No chest pain, no palpitations  GASTROINTESTINAL: No pain. No nausea or vomiting; No diarrhea   NEUROLOGICAL: No headache or numbness, no tremors  MUSCULOSKELETAL: No joint pain, no muscle pain  GENITOURINARY: no dysuria, no frequency, no hesitancy  PSYCHIATRY: no depression , no anxiety  ALL OTHER  ROS negative        Vital Signs Last 24 Hrs  T(C): 36.7 (27 Jul 2017 15:21), Max: 37.1 (27 Jul 2017 00:08)  T(F): 98 (27 Jul 2017 15:21), Max: 98.7 (27 Jul 2017 00:08)  HR: 92 (27 Jul 2017 15:21) (84 - 99)  BP: 113/63 (27 Jul 2017 15:21) (113/63 - 166/58)  BP(mean): --  RR: 16 (27 Jul 2017 15:21) (16 - 16)  SpO2: 100% (27 Jul 2017 15:21) (99% - 100%)    ________________________________________________  PHYSICAL EXAM:  GENERAL: NAD  HEENT: Normocephalic;  conjunctivae and sclerae clear; moist mucous membranes;   NECK : supple  CHEST/LUNG: Clear to auscultation bilaterally with good air entry   HEART: S1 S2  regular; no murmurs, gallops or rubs  ABDOMEN: Soft, Nontender, Nondistended; Bowel sounds present  EXTREMITIES: no cyanosis; no edema; no calf tenderness  SKIN: warm and dry; no rash  NERVOUS SYSTEM:  AAO x 1     _________________________________________________  LABS:              CAPILLARY BLOOD GLUCOSE  217 (27 Jul 2017 13:43)  163 (27 Jul 2017 11:48)  77 (27 Jul 2017 08:37)  353 (26 Jul 2017 20:50)            RADIOLOGY & ADDITIONAL TESTS:    Imaging Personally Reviewed:  YES/NO    Consultant(s) Notes Reviewed:   YES/ No    Care Discussed with Consultants :     Plan of care was discussed with patient and /or primary care giver; all questions and concerns were addressed and care was aligned with patient's wishes.

## 2017-07-27 NOTE — PROGRESS NOTE ADULT - PROBLEM SELECTOR PLAN 2
continue statin, hyperlipidemia likely secondary to medication non compliance from underlying dementia.

## 2017-07-28 PROCEDURE — 99232 SBSQ HOSP IP/OBS MODERATE 35: CPT | Mod: GC

## 2017-07-28 RX ORDER — INSULIN GLARGINE 100 [IU]/ML
33 INJECTION, SOLUTION SUBCUTANEOUS AT BEDTIME
Qty: 0 | Refills: 0 | Status: DISCONTINUED | OUTPATIENT
Start: 2017-07-28 | End: 2017-07-28

## 2017-07-28 RX ORDER — INSULIN GLARGINE 100 [IU]/ML
30 INJECTION, SOLUTION SUBCUTANEOUS AT BEDTIME
Qty: 0 | Refills: 0 | Status: DISCONTINUED | OUTPATIENT
Start: 2017-07-28 | End: 2017-08-01

## 2017-07-28 RX ADMIN — TICAGRELOR 90 MILLIGRAM(S): 90 TABLET ORAL at 18:07

## 2017-07-28 RX ADMIN — Medication 6 UNIT(S): at 18:07

## 2017-07-28 RX ADMIN — LISINOPRIL 20 MILLIGRAM(S): 2.5 TABLET ORAL at 05:45

## 2017-07-28 RX ADMIN — TICAGRELOR 90 MILLIGRAM(S): 90 TABLET ORAL at 05:44

## 2017-07-28 RX ADMIN — MIRTAZAPINE 15 MILLIGRAM(S): 45 TABLET, ORALLY DISINTEGRATING ORAL at 22:00

## 2017-07-28 RX ADMIN — AMLODIPINE BESYLATE 10 MILLIGRAM(S): 2.5 TABLET ORAL at 05:45

## 2017-07-28 RX ADMIN — INSULIN GLARGINE 30 UNIT(S): 100 INJECTION, SOLUTION SUBCUTANEOUS at 22:00

## 2017-07-28 RX ADMIN — Medication 1000 UNIT(S): at 12:58

## 2017-07-28 RX ADMIN — Medication 81 MILLIGRAM(S): at 12:58

## 2017-07-28 RX ADMIN — Medication 6 UNIT(S): at 12:58

## 2017-07-28 RX ADMIN — ATORVASTATIN CALCIUM 20 MILLIGRAM(S): 80 TABLET, FILM COATED ORAL at 22:00

## 2017-07-28 RX ADMIN — MEMANTINE HYDROCHLORIDE 5 MILLIGRAM(S): 10 TABLET ORAL at 22:00

## 2017-07-28 RX ADMIN — Medication 2.5 MILLIGRAM(S): at 05:44

## 2017-07-28 RX ADMIN — Medication 2: at 12:58

## 2017-07-28 RX ADMIN — Medication 25 MILLIGRAM(S): at 05:45

## 2017-07-28 NOTE — PROGRESS NOTE ADULT - SUBJECTIVE AND OBJECTIVE BOX
PGY 1 Note discussed with supervising resident and primary attending    Patient is a 72y old  Female who presents with a chief complaint of worsening dementia, weakness and not eating and drinking (20 Jul 2017 11:40)      INTERVAL HPI/OVERNIGHT EVENTS: Patient was seen and examined at bed side. patient had low blood sugars this morning. blood sugar raised to 210 around noon. Patient asking for food. patient's insulin requirement adjusted to lantus 30.     MEDICATIONS  (STANDING):  enoxaparin Injectable 40 milliGRAM(s) SubCutaneous daily  aspirin  chewable 81 milliGRAM(s) Oral daily  lisinopril 20 milliGRAM(s) Oral daily  atorvastatin 20 milliGRAM(s) Oral at bedtime  ticagrelor 90 milliGRAM(s) Oral two times a day  metoprolol succinate ER 25 milliGRAM(s) Oral daily  amLODIPine   Tablet 10 milliGRAM(s) Oral daily  memantine 5 milliGRAM(s) Oral at bedtime  cholecalciferol 1000 Unit(s) Oral daily  insulin lispro (HumaLOG) corrective regimen sliding scale   SubCutaneous three times a day before meals  insulin lispro Injectable (HumaLOG) 6 Unit(s) SubCutaneous three times a day before meals  predniSONE   Tablet 2.5 milliGRAM(s) Oral daily  mirtazapine 15 milliGRAM(s) Oral at bedtime  insulin glargine Injectable (LANTUS) 30 Unit(s) SubCutaneous at bedtime    MEDICATIONS  (PRN):      __________________________________________________  REVIEW OF SYSTEMS:    CONSTITUTIONAL: No fever,   EYES: no acute visual disturbances  NECK: No pain or stiffness  RESPIRATORY: No cough; No shortness of breath  CARDIOVASCULAR: No chest pain, no palpitations  GASTROINTESTINAL: No pain. No nausea or vomiting; No diarrhea   NEUROLOGICAL: No headache or numbness, no tremors  MUSCULOSKELETAL: No joint pain, no muscle pain  GENITOURINARY: no dysuria, no frequency, no hesitancy  PSYCHIATRY: no depression , no anxiety  ALL OTHER  ROS negative        Vital Signs Last 24 Hrs  T(C): 36.6 (28 Jul 2017 16:08), Max: 36.6 (28 Jul 2017 16:08)  T(F): 97.9 (28 Jul 2017 16:08), Max: 97.9 (28 Jul 2017 16:08)  HR: 85 (28 Jul 2017 16:08) (79 - 88)  BP: 148/93 (28 Jul 2017 16:08) (122/66 - 148/93)  BP(mean): --  RR: 16 (28 Jul 2017 16:08) (14 - 16)  SpO2: 100% (28 Jul 2017 16:08) (100% - 100%)    ________________________________________________  PHYSICAL EXAM:  GENERAL: NAD  HEENT: Normocephalic;  conjunctivae and sclerae clear; moist mucous membranes;   NECK : supple  CHEST/LUNG: Clear to auscultation bilaterally with good air entry   HEART: S1 S2  regular; no murmurs, gallops or rubs  ABDOMEN: Soft, Nontender, Nondistended; Bowel sounds present  EXTREMITIES: no cyanosis; no edema; no calf tenderness  SKIN: warm and dry; no rash  NERVOUS SYSTEM:  AAO x 1    _________________________________________________  LABS:              CAPILLARY BLOOD GLUCOSE  219 (28 Jul 2017 11:53)  61 (28 Jul 2017 08:34)  131 (27 Jul 2017 20:27)  97 (27 Jul 2017 17:15)            RADIOLOGY & ADDITIONAL TESTS:    Imaging Personally Reviewed:  YES/NO    Consultant(s) Notes Reviewed:   YES/ No    Care Discussed with Consultants :     Plan of care was discussed with patient and /or primary care giver; all questions and concerns were addressed and care was aligned with patient's wishes.

## 2017-07-28 NOTE — PROGRESS NOTE ADULT - SUBJECTIVE AND OBJECTIVE BOX
Patient is a 72y old  Female who presents with a chief complaint of worsening dementia, weakness and not eating and drinking (20 Jul 2017 11:40)    INTERVAL HPI/OVERNIGHT EVENTS:  Patient seen and examined at bedside, feeling ok and asking for food.  In am her sugar level was low.  She is awaiting long term rehab placement.  Allergies    No Known Allergies    Intolerances    REVIEW OF SYSTEMS:  Unable to obtain due to underlying dementia status    Medications:  enoxaparin Injectable 40 milliGRAM(s) SubCutaneous daily  aspirin  chewable 81 milliGRAM(s) Oral daily  lisinopril 20 milliGRAM(s) Oral daily  atorvastatin 20 milliGRAM(s) Oral at bedtime  ticagrelor 90 milliGRAM(s) Oral two times a day  metoprolol succinate ER 25 milliGRAM(s) Oral daily  amLODIPine   Tablet 10 milliGRAM(s) Oral daily  memantine 5 milliGRAM(s) Oral at bedtime  cholecalciferol 1000 Unit(s) Oral daily  insulin lispro (HumaLOG) corrective regimen sliding scale   SubCutaneous three times a day before meals  insulin lispro Injectable (HumaLOG) 6 Unit(s) SubCutaneous three times a day before meals  predniSONE   Tablet 2.5 milliGRAM(s) Oral daily  mirtazapine 15 milliGRAM(s) Oral at bedtime  insulin glargine Injectable (LANTUS) 33 Unit(s) SubCutaneous at bedtime      PHYSICAL EXAM:  Vital Signs Last 24 Hrs  T(C): 36.4 (28 Jul 2017 08:01), Max: 36.7 (27 Jul 2017 15:21)  T(F): 97.6 (28 Jul 2017 08:01), Max: 98 (27 Jul 2017 15:21)  HR: 79 (28 Jul 2017 08:01) (79 - 92)  BP: 134/51 (28 Jul 2017 08:01) (113/63 - 134/51)  BP(mean): --  RR: 16 (28 Jul 2017 08:01) (14 - 16)  SpO2: 100% (28 Jul 2017 08:01) (100% - 100%)    GENERAL: NAD  HEAD:  Atraumatic, Normocephalic  EYES: EOMI, PERRLA, conjunctiva and sclera clear  ENT: moist mucous membranes  NECK: Supple, No JVD, Normal thyroid  NERVOUS SYSTEM:  Alert & Oriented X3, Good concentration; Motor Strength 5/5 B/L upper and lower extremities; DTRs 2+ intact and symmetric  CHEST/LUNG: No rales, rhonchi, wheezing, or rubs  HEART: Regular rate and rhythm; No murmurs, rubs, or gallops  ABDOMEN: Soft, Nontender, Nondistended; Bowel sounds present  EXTREMITIES:  2+ Peripheral Pulses, No clubbing, cyanosis, or edema  SKIN: No rashes or lesions    LABS:    Culture & Sensitivity:   CAPILLARY BLOOD GLUCOSE  219 (28 Jul 2017 11:53)  61 (28 Jul 2017 08:34)  131 (27 Jul 2017 20:27)  97 (27 Jul 2017 17:15)  217 (27 Jul 2017 13:43)            RADIOLOGY & ADDITIONAL TESTS:  Radiology testing independently reviewed:     Consultant(s) Notes Reviewed:  [ x] YES  [ ] NO    Care Discussed with Consultants/Other Providers [ x] YES  [ ] NO

## 2017-07-28 NOTE — PROGRESS NOTE ADULT - ASSESSMENT
Patient was seen and examined at bed side. patient doing better today. eating well. Patient waiting for long term placement. Patient has been accepted for long term placement at [facility], [facility].  Spoke with patient's daughter, Dora regarding above acceptance who reported that she needed to see facility first before deciding on placement  there. Daughter reported that she will go to [facility] this afternoon for a tour. patient went to facility and returned back and she was agitated to be placed and they returned her back. waiting on her acceptance to a long term facility.

## 2017-07-28 NOTE — PROGRESS NOTE ADULT - PROBLEM SELECTOR PLAN 3
much improved on meds  Nutritionist assessed patient for protein calorie mal nutrition. they suggested   Soft, Consistent CHO, Add Glucerna Shake 1can bid as medically feasible (440kcal, 20g protein)

## 2017-07-29 LAB
ANION GAP SERPL CALC-SCNC: 10 MMOL/L — SIGNIFICANT CHANGE UP (ref 5–17)
BUN SERPL-MCNC: 22 MG/DL — HIGH (ref 7–18)
CALCIUM SERPL-MCNC: 8.3 MG/DL — LOW (ref 8.4–10.5)
CHLORIDE SERPL-SCNC: 108 MMOL/L — SIGNIFICANT CHANGE UP (ref 96–108)
CO2 SERPL-SCNC: 28 MMOL/L — SIGNIFICANT CHANGE UP (ref 22–31)
CREAT SERPL-MCNC: 0.87 MG/DL — SIGNIFICANT CHANGE UP (ref 0.5–1.3)
GLUCOSE SERPL-MCNC: 67 MG/DL — LOW (ref 70–99)
HCT VFR BLD CALC: 32.1 % — LOW (ref 34.5–45)
HGB BLD-MCNC: 10.4 G/DL — LOW (ref 11.5–15.5)
MAGNESIUM SERPL-MCNC: 2.6 MG/DL — SIGNIFICANT CHANGE UP (ref 1.6–2.6)
MCHC RBC-ENTMCNC: 29.2 PG — SIGNIFICANT CHANGE UP (ref 27–34)
MCHC RBC-ENTMCNC: 32.4 GM/DL — SIGNIFICANT CHANGE UP (ref 32–36)
MCV RBC AUTO: 90.1 FL — SIGNIFICANT CHANGE UP (ref 80–100)
PHOSPHATE SERPL-MCNC: 5 MG/DL — HIGH (ref 2.5–4.5)
PLATELET # BLD AUTO: 339 K/UL — SIGNIFICANT CHANGE UP (ref 150–400)
POTASSIUM SERPL-MCNC: 4.5 MMOL/L — SIGNIFICANT CHANGE UP (ref 3.5–5.3)
POTASSIUM SERPL-SCNC: 4.5 MMOL/L — SIGNIFICANT CHANGE UP (ref 3.5–5.3)
RBC # BLD: 3.56 M/UL — LOW (ref 3.8–5.2)
RBC # FLD: 15 % — HIGH (ref 10.3–14.5)
SODIUM SERPL-SCNC: 146 MMOL/L — HIGH (ref 135–145)
WBC # BLD: 7.1 K/UL — SIGNIFICANT CHANGE UP (ref 3.8–10.5)
WBC # FLD AUTO: 7.1 K/UL — SIGNIFICANT CHANGE UP (ref 3.8–10.5)

## 2017-07-29 RX ADMIN — Medication 25 MILLIGRAM(S): at 05:43

## 2017-07-29 RX ADMIN — MEMANTINE HYDROCHLORIDE 5 MILLIGRAM(S): 10 TABLET ORAL at 22:21

## 2017-07-29 RX ADMIN — ENOXAPARIN SODIUM 40 MILLIGRAM(S): 100 INJECTION SUBCUTANEOUS at 11:52

## 2017-07-29 RX ADMIN — INSULIN GLARGINE 30 UNIT(S): 100 INJECTION, SOLUTION SUBCUTANEOUS at 22:21

## 2017-07-29 RX ADMIN — ATORVASTATIN CALCIUM 20 MILLIGRAM(S): 80 TABLET, FILM COATED ORAL at 22:21

## 2017-07-29 RX ADMIN — TICAGRELOR 90 MILLIGRAM(S): 90 TABLET ORAL at 05:43

## 2017-07-29 RX ADMIN — Medication 1000 UNIT(S): at 11:52

## 2017-07-29 RX ADMIN — Medication 81 MILLIGRAM(S): at 11:52

## 2017-07-29 RX ADMIN — TICAGRELOR 90 MILLIGRAM(S): 90 TABLET ORAL at 17:04

## 2017-07-29 RX ADMIN — Medication 2.5 MILLIGRAM(S): at 05:43

## 2017-07-29 RX ADMIN — Medication 3: at 11:51

## 2017-07-29 RX ADMIN — AMLODIPINE BESYLATE 10 MILLIGRAM(S): 2.5 TABLET ORAL at 05:43

## 2017-07-29 RX ADMIN — Medication 6 UNIT(S): at 11:51

## 2017-07-29 RX ADMIN — Medication 6 UNIT(S): at 17:04

## 2017-07-29 RX ADMIN — LISINOPRIL 20 MILLIGRAM(S): 2.5 TABLET ORAL at 05:43

## 2017-07-29 RX ADMIN — MIRTAZAPINE 15 MILLIGRAM(S): 45 TABLET, ORALLY DISINTEGRATING ORAL at 22:21

## 2017-07-29 NOTE — PROGRESS NOTE ADULT - SUBJECTIVE AND OBJECTIVE BOX
Patient is a 72y old  Female who presents with a chief complaint of worsening dementia, weakness and not eating and drinking (20 Jul 2017 11:40)      INTERVAL HPI/OVERNIGHT EVENTS: no new complaints    MEDICATIONS  (STANDING):  enoxaparin Injectable 40 milliGRAM(s) SubCutaneous daily  aspirin  chewable 81 milliGRAM(s) Oral daily  lisinopril 20 milliGRAM(s) Oral daily  atorvastatin 20 milliGRAM(s) Oral at bedtime  ticagrelor 90 milliGRAM(s) Oral two times a day  metoprolol succinate ER 25 milliGRAM(s) Oral daily  amLODIPine   Tablet 10 milliGRAM(s) Oral daily  memantine 5 milliGRAM(s) Oral at bedtime  cholecalciferol 1000 Unit(s) Oral daily  insulin lispro (HumaLOG) corrective regimen sliding scale   SubCutaneous three times a day before meals  insulin lispro Injectable (HumaLOG) 6 Unit(s) SubCutaneous three times a day before meals  predniSONE   Tablet 2.5 milliGRAM(s) Oral daily  mirtazapine 15 milliGRAM(s) Oral at bedtime  insulin glargine Injectable (LANTUS) 30 Unit(s) SubCutaneous at bedtime    MEDICATIONS  (PRN):      __________________________________________________  REVIEW OF SYSTEMS:    CONSTITUTIONAL: No fever,   EYES: no acute visual disturbances  NECK: No pain or stiffness  RESPIRATORY: No cough; No shortness of breath  CARDIOVASCULAR: No chest pain, no palpitations  GASTROINTESTINAL: No pain. No nausea or vomiting; No diarrhea   NEUROLOGICAL: No headache or numbness, no tremors  MUSCULOSKELETAL: No joint pain, no muscle pain  GENITOURINARY: no dysuria, no frequency, no hesitancy  PSYCHIATRY: no depression , no anxiety  ALL OTHER  ROS negative        Vital Signs Last 24 Hrs  T(C): 36.1 (29 Jul 2017 15:23), Max: 37 (29 Jul 2017 08:09)  T(F): 97 (29 Jul 2017 15:23), Max: 98.6 (29 Jul 2017 08:09)  HR: 91 (29 Jul 2017 15:23) (76 - 98)  BP: 120/47 (29 Jul 2017 15:23) (119/43 - 135/56)  BP(mean): --  RR: 16 (29 Jul 2017 15:23) (16 - 16)  SpO2: 100% (29 Jul 2017 15:23) (98% - 100%)    ________________________________________________  PHYSICAL EXAM:  GENERAL: NAD  HEENT: Normocephalic;  conjunctivae and sclerae clear; moist mucous membranes;   NECK : supple  CHEST/LUNG: Clear to auscultation bilaterally with good air entry   HEART: S1 S2  regular; no murmurs, gallops or rubs  ABDOMEN: Soft, Nontender, Nondistended; Bowel sounds present  EXTREMITIES: no cyanosis; no edema; no calf tenderness  SKIN: warm and dry; no rash  NERVOUS SYSTEM:  Awake and alert; Oriented  to place, person and time ; no new deficits    _________________________________________________  LABS:                        10.4   7.1   )-----------( 339      ( 29 Jul 2017 06:40 )             32.1     07-29    146<H>  |  108  |  22<H>  ----------------------------<  67<L>  4.5   |  28  |  0.87    Ca    8.3<L>      29 Jul 2017 06:40  Phos  5.0     07-29  Mg     2.6     07-29          CAPILLARY BLOOD GLUCOSE  140 (29 Jul 2017 16:33)  259 (29 Jul 2017 11:40)  101 (29 Jul 2017 08:30)  215 (28 Jul 2017 20:50)            RADIOLOGY & ADDITIONAL TESTS:    Imaging Personally Reviewed:  YES/NO    Consultant(s) Notes Reviewed:   YES/ No    Care Discussed with Consultants :     Plan of care was discussed with patient and /or primary care giver; all questions and concerns were addressed and care was aligned with patient's wishes. Patient is a 72y old  Female who presents with a chief complaint of worsening dementia, weakness and not eating and drinking (20 Jul 2017 11:40)      INTERVAL HPI/OVERNIGHT EVENTS: Patient seen and evaluated at bedside. no new complaints. Blood sugars much better today. Appears comfortable.      MEDICATIONS  (STANDING):  enoxaparin Injectable 40 milliGRAM(s) SubCutaneous daily  aspirin  chewable 81 milliGRAM(s) Oral daily  lisinopril 20 milliGRAM(s) Oral daily  atorvastatin 20 milliGRAM(s) Oral at bedtime  ticagrelor 90 milliGRAM(s) Oral two times a day  metoprolol succinate ER 25 milliGRAM(s) Oral daily  amLODIPine   Tablet 10 milliGRAM(s) Oral daily  memantine 5 milliGRAM(s) Oral at bedtime  cholecalciferol 1000 Unit(s) Oral daily  insulin lispro (HumaLOG) corrective regimen sliding scale   SubCutaneous three times a day before meals  insulin lispro Injectable (HumaLOG) 6 Unit(s) SubCutaneous three times a day before meals  predniSONE   Tablet 2.5 milliGRAM(s) Oral daily  mirtazapine 15 milliGRAM(s) Oral at bedtime  insulin glargine Injectable (LANTUS) 30 Unit(s) SubCutaneous at bedtime    MEDICATIONS  (PRN):      __________________________________________________  REVIEW OF SYSTEMS:    CONSTITUTIONAL: No fever,   EYES: no acute visual disturbances  NECK: No pain or stiffness  RESPIRATORY: No cough; No shortness of breath  CARDIOVASCULAR: No chest pain, no palpitations  GASTROINTESTINAL: No pain. No nausea or vomiting; No diarrhea   NEUROLOGICAL: No headache or numbness, no tremors  MUSCULOSKELETAL: No joint pain, no muscle pain  GENITOURINARY: no dysuria, no frequency, no hesitancy  PSYCHIATRY: no depression , no anxiety  ALL OTHER  ROS negative        Vital Signs Last 24 Hrs  T(C): 36.1 (29 Jul 2017 15:23), Max: 37 (29 Jul 2017 08:09)  T(F): 97 (29 Jul 2017 15:23), Max: 98.6 (29 Jul 2017 08:09)  HR: 91 (29 Jul 2017 15:23) (76 - 98)  BP: 120/47 (29 Jul 2017 15:23) (119/43 - 135/56)  BP(mean): --  RR: 16 (29 Jul 2017 15:23) (16 - 16)  SpO2: 100% (29 Jul 2017 15:23) (98% - 100%)    ________________________________________________  PHYSICAL EXAM:  GENERAL: NAD, thin  HEENT: Normocephalic;  conjunctivae and sclerae clear; moist mucous membranes;   NECK : supple  CHEST/LUNG: Clear to auscultation bilaterally with good air entry   HEART: S1 S2  regular; no murmurs, gallops or rubs  ABDOMEN: Soft, Nontender, Nondistended; Bowel sounds present  EXTREMITIES: extremity muscle wasting, no cyanosis; no edema; no calf tenderness  SKIN: warm and dry; no rash  NERVOUS SYSTEM:  Awake and alert; Oriented  to place, person and time ; no new deficits    _________________________________________________  LABS:                        10.4   7.1   )-----------( 339      ( 29 Jul 2017 06:40 )             32.1     07-29    146<H>  |  108  |  22<H>  ----------------------------<  67<L>  4.5   |  28  |  0.87    Ca    8.3<L>      29 Jul 2017 06:40  Phos  5.0     07-29  Mg     2.6     07-29          CAPILLARY BLOOD GLUCOSE  140 (29 Jul 2017 16:33)  259 (29 Jul 2017 11:40)  101 (29 Jul 2017 08:30)  215 (28 Jul 2017 20:50)            RADIOLOGY & ADDITIONAL TESTS:    Imaging Personally Reviewed:  YES/NO    Consultant(s) Notes Reviewed:   YES/ No    Care Discussed with Consultants :     Plan of care was discussed with patient and /or primary care giver; all questions and concerns were addressed and care was aligned with patient's wishes.

## 2017-07-30 LAB
ANION GAP SERPL CALC-SCNC: 8 MMOL/L — SIGNIFICANT CHANGE UP (ref 5–17)
BUN SERPL-MCNC: 21 MG/DL — HIGH (ref 7–18)
CALCIUM SERPL-MCNC: 8.5 MG/DL — SIGNIFICANT CHANGE UP (ref 8.4–10.5)
CHLORIDE SERPL-SCNC: 107 MMOL/L — SIGNIFICANT CHANGE UP (ref 96–108)
CO2 SERPL-SCNC: 30 MMOL/L — SIGNIFICANT CHANGE UP (ref 22–31)
CREAT SERPL-MCNC: 1.07 MG/DL — SIGNIFICANT CHANGE UP (ref 0.5–1.3)
GLUCOSE SERPL-MCNC: 72 MG/DL — SIGNIFICANT CHANGE UP (ref 70–99)
HCT VFR BLD CALC: 31.5 % — LOW (ref 34.5–45)
HGB BLD-MCNC: 10 G/DL — LOW (ref 11.5–15.5)
MAGNESIUM SERPL-MCNC: 2.5 MG/DL — SIGNIFICANT CHANGE UP (ref 1.6–2.6)
MCHC RBC-ENTMCNC: 29 PG — SIGNIFICANT CHANGE UP (ref 27–34)
MCHC RBC-ENTMCNC: 31.9 GM/DL — LOW (ref 32–36)
MCV RBC AUTO: 91 FL — SIGNIFICANT CHANGE UP (ref 80–100)
PHOSPHATE SERPL-MCNC: 4.9 MG/DL — HIGH (ref 2.5–4.5)
PLATELET # BLD AUTO: 408 K/UL — HIGH (ref 150–400)
POTASSIUM SERPL-MCNC: 4.4 MMOL/L — SIGNIFICANT CHANGE UP (ref 3.5–5.3)
POTASSIUM SERPL-SCNC: 4.4 MMOL/L — SIGNIFICANT CHANGE UP (ref 3.5–5.3)
RBC # BLD: 3.46 M/UL — LOW (ref 3.8–5.2)
RBC # FLD: 15 % — HIGH (ref 10.3–14.5)
SODIUM SERPL-SCNC: 145 MMOL/L — SIGNIFICANT CHANGE UP (ref 135–145)
WBC # BLD: 7.8 K/UL — SIGNIFICANT CHANGE UP (ref 3.8–10.5)
WBC # FLD AUTO: 7.8 K/UL — SIGNIFICANT CHANGE UP (ref 3.8–10.5)

## 2017-07-30 RX ORDER — CALCIUM ACETATE 667 MG
667 TABLET ORAL
Qty: 0 | Refills: 0 | Status: COMPLETED | OUTPATIENT
Start: 2017-07-30 | End: 2017-07-31

## 2017-07-30 RX ORDER — MIRTAZAPINE 45 MG/1
7.5 TABLET, ORALLY DISINTEGRATING ORAL DAILY
Qty: 0 | Refills: 0 | Status: DISCONTINUED | OUTPATIENT
Start: 2017-07-30 | End: 2017-08-02

## 2017-07-30 RX ADMIN — Medication 81 MILLIGRAM(S): at 11:35

## 2017-07-30 RX ADMIN — Medication 6 UNIT(S): at 17:56

## 2017-07-30 RX ADMIN — TICAGRELOR 90 MILLIGRAM(S): 90 TABLET ORAL at 17:56

## 2017-07-30 RX ADMIN — Medication 3: at 17:56

## 2017-07-30 RX ADMIN — AMLODIPINE BESYLATE 10 MILLIGRAM(S): 2.5 TABLET ORAL at 06:42

## 2017-07-30 RX ADMIN — Medication 6 UNIT(S): at 08:06

## 2017-07-30 RX ADMIN — Medication 6 UNIT(S): at 11:37

## 2017-07-30 RX ADMIN — Medication 25 MILLIGRAM(S): at 06:42

## 2017-07-30 RX ADMIN — TICAGRELOR 90 MILLIGRAM(S): 90 TABLET ORAL at 06:42

## 2017-07-30 RX ADMIN — Medication 1000 UNIT(S): at 11:35

## 2017-07-30 RX ADMIN — LISINOPRIL 20 MILLIGRAM(S): 2.5 TABLET ORAL at 06:42

## 2017-07-30 RX ADMIN — Medication 1: at 08:06

## 2017-07-30 RX ADMIN — Medication 667 MILLIGRAM(S): at 17:56

## 2017-07-30 NOTE — PROGRESS NOTE ADULT - SUBJECTIVE AND OBJECTIVE BOX
PGY 1 Note discussed with supervising resident and primary attending    Patient is a 72y old  Female who presents with a chief complaint of worsening dementia, weakness and not eating and drinking (20 Jul 2017 11:40)      INTERVAL HPI/OVERNIGHT EVENTS: Patient looked agitated and tried to walk around and jump out of bed. Put patient on 1:1 observation    MEDICATIONS  (STANDING):  enoxaparin Injectable 40 milliGRAM(s) SubCutaneous daily  aspirin  chewable 81 milliGRAM(s) Oral daily  lisinopril 20 milliGRAM(s) Oral daily  atorvastatin 20 milliGRAM(s) Oral at bedtime  ticagrelor 90 milliGRAM(s) Oral two times a day  metoprolol succinate ER 25 milliGRAM(s) Oral daily  amLODIPine   Tablet 10 milliGRAM(s) Oral daily  memantine 5 milliGRAM(s) Oral at bedtime  cholecalciferol 1000 Unit(s) Oral daily  insulin lispro (HumaLOG) corrective regimen sliding scale   SubCutaneous three times a day before meals  insulin lispro Injectable (HumaLOG) 6 Unit(s) SubCutaneous three times a day before meals  mirtazapine 15 milliGRAM(s) Oral at bedtime  insulin glargine Injectable (LANTUS) 30 Unit(s) SubCutaneous at bedtime    MEDICATIONS  (PRN):      __________________________________________________  REVIEW OF SYSTEMS:    CONSTITUTIONAL: No fever,   EYES: no acute visual disturbances  NECK: No pain or stiffness  RESPIRATORY: No cough; No shortness of breath  CARDIOVASCULAR: No chest pain, no palpitations  GASTROINTESTINAL: No pain. No nausea or vomiting; No diarrhea   NEUROLOGICAL: No headache or numbness, no tremors  MUSCULOSKELETAL: No joint pain, no muscle pain  GENITOURINARY: no dysuria, no frequency, no hesitancy  PSYCHIATRY: no depression , no anxiety  ALL OTHER  ROS negative        Vital Signs Last 24 Hrs  T(C): 36.4 (30 Jul 2017 15:16), Max: 36.4 (29 Jul 2017 23:18)  T(F): 97.5 (30 Jul 2017 15:16), Max: 97.5 (29 Jul 2017 23:18)  HR: 100 (30 Jul 2017 15:16) (91 - 100)  BP: 125/48 (30 Jul 2017 15:16) (123/59 - 125/48)  BP(mean): --  RR: 16 (30 Jul 2017 15:16) (16 - 17)  SpO2: 99% (30 Jul 2017 15:16) (99% - 100%)    ________________________________________________  PHYSICAL EXAM:  GENERAL: NAD  HEENT: Normocephalic;  conjunctivae and sclerae clear; moist mucous membranes;   NECK : supple  CHEST/LUNG: Clear to auscultation bilaterally with good air entry   HEART: S1 S2  regular; no murmurs, gallops or rubs  ABDOMEN: Soft, Nontender, Nondistended; Bowel sounds present  EXTREMITIES: no cyanosis; no edema; no calf tenderness  SKIN: warm and dry; no rash  NERVOUS SYSTEM:  AAO x 1    _________________________________________________  LABS:                        10.0   7.8   )-----------( 408      ( 30 Jul 2017 10:57 )             31.5     07-30    145  |  107  |  21<H>  ----------------------------<  72  4.4   |  30  |  1.07    Ca    8.5      30 Jul 2017 10:55  Phos  4.9     07-30  Mg     2.5     07-30          CAPILLARY BLOOD GLUCOSE  159 (30 Jul 2017 08:03)  143 (29 Jul 2017 21:26)  140 (29 Jul 2017 16:33)            RADIOLOGY & ADDITIONAL TESTS:    Imaging Personally Reviewed:  YES/NO    Consultant(s) Notes Reviewed:   YES/ No    Care Discussed with Consultants :     Plan of care was discussed with patient and /or primary care giver; all questions and concerns were addressed and care was aligned with patient's wishes. PGY 1 Note discussed with supervising resident and primary attending    Patient is a 72y old  Female who presents with a chief complaint of worsening dementia, weakness and not eating and drinking (20 Jul 2017 11:40)      INTERVAL HPI/OVERNIGHT EVENTS: Patient looked agitated and tried to walk around and jump out of bed. Put patient on 1:1 observation. Patient had elevated serum phosphate today. started on phos lo 3 doses for a day.     MEDICATIONS  (STANDING):  enoxaparin Injectable 40 milliGRAM(s) SubCutaneous daily  aspirin  chewable 81 milliGRAM(s) Oral daily  lisinopril 20 milliGRAM(s) Oral daily  atorvastatin 20 milliGRAM(s) Oral at bedtime  ticagrelor 90 milliGRAM(s) Oral two times a day  metoprolol succinate ER 25 milliGRAM(s) Oral daily  amLODIPine   Tablet 10 milliGRAM(s) Oral daily  memantine 5 milliGRAM(s) Oral at bedtime  cholecalciferol 1000 Unit(s) Oral daily  insulin lispro (HumaLOG) corrective regimen sliding scale   SubCutaneous three times a day before meals  insulin lispro Injectable (HumaLOG) 6 Unit(s) SubCutaneous three times a day before meals  mirtazapine 15 milliGRAM(s) Oral at bedtime  insulin glargine Injectable (LANTUS) 30 Unit(s) SubCutaneous at bedtime    MEDICATIONS  (PRN):      __________________________________________________  REVIEW OF SYSTEMS:    CONSTITUTIONAL: No fever,   EYES: no acute visual disturbances  NECK: No pain or stiffness  RESPIRATORY: No cough; No shortness of breath  CARDIOVASCULAR: No chest pain, no palpitations  GASTROINTESTINAL: No pain. No nausea or vomiting; No diarrhea   NEUROLOGICAL: No headache or numbness, no tremors  MUSCULOSKELETAL: No joint pain, no muscle pain  GENITOURINARY: no dysuria, no frequency, no hesitancy  PSYCHIATRY: no depression , no anxiety  ALL OTHER  ROS negative        Vital Signs Last 24 Hrs  T(C): 36.4 (30 Jul 2017 15:16), Max: 36.4 (29 Jul 2017 23:18)  T(F): 97.5 (30 Jul 2017 15:16), Max: 97.5 (29 Jul 2017 23:18)  HR: 100 (30 Jul 2017 15:16) (91 - 100)  BP: 125/48 (30 Jul 2017 15:16) (123/59 - 125/48)  BP(mean): --  RR: 16 (30 Jul 2017 15:16) (16 - 17)  SpO2: 99% (30 Jul 2017 15:16) (99% - 100%)    ________________________________________________  PHYSICAL EXAM:  GENERAL: NAD  HEENT: Normocephalic;  conjunctivae and sclerae clear; moist mucous membranes;   NECK : supple  CHEST/LUNG: Clear to auscultation bilaterally with good air entry   HEART: S1 S2  regular; no murmurs, gallops or rubs  ABDOMEN: Soft, Nontender, Nondistended; Bowel sounds present  EXTREMITIES: no cyanosis; no edema; no calf tenderness  SKIN: warm and dry; no rash  NERVOUS SYSTEM:  AAO x 1    _________________________________________________  LABS:                        10.0   7.8   )-----------( 408      ( 30 Jul 2017 10:57 )             31.5     07-30    145  |  107  |  21<H>  ----------------------------<  72  4.4   |  30  |  1.07    Ca    8.5      30 Jul 2017 10:55  Phos  4.9     07-30  Mg     2.5     07-30          CAPILLARY BLOOD GLUCOSE  159 (30 Jul 2017 08:03)  143 (29 Jul 2017 21:26)  140 (29 Jul 2017 16:33)            RADIOLOGY & ADDITIONAL TESTS:    Imaging Personally Reviewed:  YES/NO    Consultant(s) Notes Reviewed:   YES/ No    Care Discussed with Consultants :     Plan of care was discussed with patient and /or primary care giver; all questions and concerns were addressed and care was aligned with patient's wishes.

## 2017-07-30 NOTE — PROGRESS NOTE ADULT - ATTENDING COMMENTS
Patient seen/evaluated at bedside 7/30. I agree with the resident progress note/outlined plan of care. My independent findings and conclusions are documented.    Patient was agitated this am, climbing out of bed x several hours and was placed on 1:1 by staff.    -agitation: may consider am remeron or low dose seroquel 12.5 as behavioral issues would be barrier to discharge  -review w/ psychiatry tomorrow am

## 2017-07-31 DIAGNOSIS — R45.1 RESTLESSNESS AND AGITATION: ICD-10-CM

## 2017-07-31 PROCEDURE — 99233 SBSQ HOSP IP/OBS HIGH 50: CPT | Mod: GC

## 2017-07-31 RX ORDER — QUETIAPINE FUMARATE 200 MG/1
12.5 TABLET, FILM COATED ORAL
Qty: 0 | Refills: 0 | Status: DISCONTINUED | OUTPATIENT
Start: 2017-07-31 | End: 2017-08-02

## 2017-07-31 RX ORDER — MEMANTINE HYDROCHLORIDE 10 MG/1
10 TABLET ORAL AT BEDTIME
Qty: 0 | Refills: 0 | Status: DISCONTINUED | OUTPATIENT
Start: 2017-07-31 | End: 2017-08-02

## 2017-07-31 RX ADMIN — MIRTAZAPINE 15 MILLIGRAM(S): 45 TABLET, ORALLY DISINTEGRATING ORAL at 22:14

## 2017-07-31 RX ADMIN — Medication 2: at 12:22

## 2017-07-31 RX ADMIN — QUETIAPINE FUMARATE 12.5 MILLIGRAM(S): 200 TABLET, FILM COATED ORAL at 17:29

## 2017-07-31 RX ADMIN — TICAGRELOR 90 MILLIGRAM(S): 90 TABLET ORAL at 17:28

## 2017-07-31 RX ADMIN — Medication 6 UNIT(S): at 17:28

## 2017-07-31 RX ADMIN — Medication 6 UNIT(S): at 12:21

## 2017-07-31 RX ADMIN — Medication 81 MILLIGRAM(S): at 12:20

## 2017-07-31 RX ADMIN — Medication 1000 UNIT(S): at 12:20

## 2017-07-31 RX ADMIN — INSULIN GLARGINE 30 UNIT(S): 100 INJECTION, SOLUTION SUBCUTANEOUS at 22:14

## 2017-07-31 RX ADMIN — MIRTAZAPINE 7.5 MILLIGRAM(S): 45 TABLET, ORALLY DISINTEGRATING ORAL at 12:20

## 2017-07-31 RX ADMIN — MEMANTINE HYDROCHLORIDE 10 MILLIGRAM(S): 10 TABLET ORAL at 22:14

## 2017-07-31 RX ADMIN — Medication 2: at 17:28

## 2017-07-31 RX ADMIN — Medication 6 UNIT(S): at 09:14

## 2017-07-31 RX ADMIN — Medication 667 MILLIGRAM(S): at 12:20

## 2017-07-31 RX ADMIN — ATORVASTATIN CALCIUM 20 MILLIGRAM(S): 80 TABLET, FILM COATED ORAL at 22:14

## 2017-07-31 RX ADMIN — ENOXAPARIN SODIUM 40 MILLIGRAM(S): 100 INJECTION SUBCUTANEOUS at 12:20

## 2017-07-31 RX ADMIN — Medication 667 MILLIGRAM(S): at 09:14

## 2017-07-31 NOTE — PROGRESS NOTE BEHAVIORAL HEALTH - NSBHCHARTREVIEWINVESTIGATE_PSY_A_CORE FT
Ventricular Rate 59 BPM    Atrial Rate 59 BPM    P-R Interval 126 ms    QRS Duration 86 ms     ms    QTc 426 ms    P Axis 66 degrees    R Axis 27 degrees    T Axis 104 degrees    Diagnosis Line Sinus bradycardia  Possible Left atrial enlargement  Nonspecific ST and T wave abnormality  Abnormal ECG

## 2017-07-31 NOTE — PROGRESS NOTE ADULT - PROBLEM SELECTOR PLAN 3
Stable  c/w statin, hyperlipidemia likely secondary to medication non compliance from underlying dementia and medication refusal.

## 2017-07-31 NOTE — PROGRESS NOTE ADULT - ATTENDING COMMENTS
DC planning to Long term rehab, family says they may consider to take her home if no accepting facility.

## 2017-07-31 NOTE — PROGRESS NOTE BEHAVIORAL HEALTH - NSBHFUPINTERVALCCFT_PSY_A_CORE
pt is restless, eating, opening everything from tray, taking a bite and leaving eat and wants something else like Glucerna. pt doesn't know where she is. says she lived with her daughter but now lives "elsewhere". pt is vague. knows her name and age. she says she has DM. doesn't know why she is in the hospital. doesn't know date. says she feels down because she is lonely. as per 1:1 pt is restless. sleeps for a little bit and then wakes up and is restless.

## 2017-07-31 NOTE — PROGRESS NOTE ADULT - PROBLEM SELECTOR PLAN 1
Psychiatry service Dr. Garcia was consulted and saw pt today. Appreciate advice.  Pt was agitated over the weekend and 1 to 1 constant observation was enforced.   Rec. adding seroquel 12.5mg BID for agitation

## 2017-07-31 NOTE — PROGRESS NOTE BEHAVIORAL HEALTH - SUMMARY
Patient was evaluated,chart was reviewed,case was discussed with MD.  Patients daughter seen.  Patient appeared irritable angry,unccoperative at times.  Patient has been having periodic anger outburst.  She is a/o x2,verbal.Speech is goal directed,illogical at times.has paranoid ideation.Denied a/v hallucinations.denied s/h thought.memory and cognitioon-limited.I&J-limited.
72 F from home, lives with daughter, with HHA 5hours per day 5 days a week PMH of  Dementia, Depression, DM, HTN, HLD, CAD x3 stents last stent in Aug 2016, Glaucoma, legally blind, brought in by Daughter  with worsening dementia, weakness and not eating and drinking sufficiently since May/2017.  current meds: mirtazapine 7.5 mg am and 15 mg hs. namenda 5 mg hs.  pt is confused and restless. was agitated during the weekend.  will recommend seroquel 12.5 mgh bid to help with agitation.  continue 1:1 observation.

## 2017-07-31 NOTE — PROGRESS NOTE ADULT - SUBJECTIVE AND OBJECTIVE BOX
PGY 1 Note discussed with supervising resident and primary attending    Patient is a 72y old  Female who presents with a chief complaint of worsening dementia, weakness and not eating and drinking (20 Jul 2017 11:40)      INTERVAL HPI/OVERNIGHT EVENTS: Pt was seen and examined at bedside. Pt was calm when I saw her in the AM. On 1 to 1 observation.    MEDICATIONS  (STANDING):  enoxaparin Injectable 40 milliGRAM(s) SubCutaneous daily  aspirin  chewable 81 milliGRAM(s) Oral daily  lisinopril 20 milliGRAM(s) Oral daily  atorvastatin 20 milliGRAM(s) Oral at bedtime  ticagrelor 90 milliGRAM(s) Oral two times a day  metoprolol succinate ER 25 milliGRAM(s) Oral daily  amLODIPine   Tablet 10 milliGRAM(s) Oral daily  cholecalciferol 1000 Unit(s) Oral daily  insulin lispro (HumaLOG) corrective regimen sliding scale   SubCutaneous three times a day before meals  insulin lispro Injectable (HumaLOG) 6 Unit(s) SubCutaneous three times a day before meals  mirtazapine 15 milliGRAM(s) Oral at bedtime  insulin glargine Injectable (LANTUS) 30 Unit(s) SubCutaneous at bedtime  mirtazapine 7.5 milliGRAM(s) Oral daily  memantine 10 milliGRAM(s) Oral at bedtime  QUEtiapine 12.5 milliGRAM(s) Oral two times a day    MEDICATIONS  (PRN):      __________________________________________________  REVIEW OF SYSTEMS: Unable to elicit as pt is AAOx1       Vital Signs Last 24 Hrs  T(C): 36.7 (31 Jul 2017 15:35), Max: 36.7 (31 Jul 2017 15:35)  T(F): 98.1 (31 Jul 2017 15:35), Max: 98.1 (31 Jul 2017 15:35)  HR: 98 (31 Jul 2017 15:35) (91 - 98)  BP: 116/47 (31 Jul 2017 15:35) (116/47 - 149/48)  BP(mean): --  RR: 16 (31 Jul 2017 15:35) (16 - 16)  SpO2: 99% (31 Jul 2017 15:35) (99% - 100%)    ________________________________________________  PHYSICAL EXAM:  GENERAL: NAD  HEENT: Normocephalic;  conjunctivae and sclerae clear; moist mucous membranes;   NECK : supple  CHEST/LUNG: Clear to auscultation bilaterally with good air entry   HEART: S1 S2  regular; no murmurs, gallops or rubs  ABDOMEN: Soft, Nontender, Nondistended; Bowel sounds present  EXTREMITIES: no cyanosis; no edema; no calf tenderness  SKIN: warm and dry; no rash  NERVOUS SYSTEM:  AAO x 1    _________________________________________________  LABS:                        10.0   7.8   )-----------( 408      ( 30 Jul 2017 10:57 )             31.5     07-30    145  |  107  |  21<H>  ----------------------------<  72  4.4   |  30  |  1.07    Ca    8.5      30 Jul 2017 10:55  Phos  4.9     07-30  Mg     2.5     07-30          CAPILLARY BLOOD GLUCOSE  249 (31 Jul 2017 16:45)  206 (31 Jul 2017 11:36)  138 (31 Jul 2017 08:20)  104 (30 Jul 2017 21:01)        Consultant(s) Notes Reviewed:   YES    Care Discussed with PGY-2/Attending/Consultants :  YES    Plan of care was discussed with patient and /or primary care giver; all questions and concerns were addressed and care was aligned with patient's wishes.

## 2017-07-31 NOTE — PROGRESS NOTE BEHAVIORAL HEALTH - NSBHCONSFOLLOWNEEDS_PSY_A_CORE
no psychiatric contraindications to discharge
Patient needs further psychiatric safety assessment prior to discharge

## 2017-07-31 NOTE — PROGRESS NOTE BEHAVIORAL HEALTH - NSBHCHARTREVIEWLAB_PSY_A_CORE FT
10.0   7.8   )-----------( 408      ( 30 Jul 2017 10:57 )             31.5     07-30    145  |  107  |  21<H>  ----------------------------<  72  4.4   |  30  |  1.07    Ca    8.5      30 Jul 2017 10:55  Phos  4.9     07-30  Mg     2.5     07-30        TSH    B12  Vitamin B12, Serum: 456 pg/mL (07-18-17 @ 09:34)  Vitamin B12, Serum: 709 pg/mL (05-04-17 @ 10:10)    Folate  Folate, Serum: 11.4 ng/mL (07-18-17 @ 09:34)  Folate, Serum: 17.2 ng/mL (05-04-17 @ 10:10)

## 2017-07-31 NOTE — PROGRESS NOTE ADULT - SUBJECTIVE AND OBJECTIVE BOX
Patient is a 72y old  Female who presents with a chief complaint of worsening dementia, weakness and not eating and drinking (20 Jul 2017 11:40)    INTERVAL HPI/OVERNIGHT EVENTS:  Patient seen and examined at bedside,   She was agitated in am and currently is on 1:1 .  Seen by Psych who recommended seroquel for her. Her appetite remains high.  She told me in am that she is hearing voices, before that she used to see people around her. I asked if the voices tell her to do something specific,   she answered no. I asked her if she has any intentions to hurt self, she initially mentioned, she is tired of life and wants to throw herself in water, then she says she loves her life!!    Allergies    No Known Allergies    Intolerances    REVIEW OF SYSTEMS:  unable to obtain due to underlying dementia    Medications:  enoxaparin Injectable 40 milliGRAM(s) SubCutaneous daily  aspirin  chewable 81 milliGRAM(s) Oral daily  lisinopril 20 milliGRAM(s) Oral daily  atorvastatin 20 milliGRAM(s) Oral at bedtime  ticagrelor 90 milliGRAM(s) Oral two times a day  metoprolol succinate ER 25 milliGRAM(s) Oral daily  amLODIPine   Tablet 10 milliGRAM(s) Oral daily  cholecalciferol 1000 Unit(s) Oral daily  insulin lispro (HumaLOG) corrective regimen sliding scale   SubCutaneous three times a day before meals  insulin lispro Injectable (HumaLOG) 6 Unit(s) SubCutaneous three times a day before meals  mirtazapine 15 milliGRAM(s) Oral at bedtime  insulin glargine Injectable (LANTUS) 30 Unit(s) SubCutaneous at bedtime  mirtazapine 7.5 milliGRAM(s) Oral daily  memantine 10 milliGRAM(s) Oral at bedtime  QUEtiapine 12.5 milliGRAM(s) Oral two times a day      PHYSICAL EXAM:  Vital Signs Last 24 Hrs  T(C): 36.7 (31 Jul 2017 15:35), Max: 36.7 (31 Jul 2017 15:35)  T(F): 98.1 (31 Jul 2017 15:35), Max: 98.1 (31 Jul 2017 15:35)  HR: 98 (31 Jul 2017 15:35) (91 - 98)  BP: 116/47 (31 Jul 2017 15:35) (116/47 - 149/48)  BP(mean): --  RR: 16 (31 Jul 2017 15:35) (16 - 16)  SpO2: 99% (31 Jul 2017 15:35) (99% - 100%)    GENERAL: NAD  HEAD:  Atraumatic, Normocephalic  EYES: EOMI, PERRLA, conjunctiva and sclera clear  ENT: moist mucous membranes  NECK: Supple, No JVD, Normal thyroid  NERVOUS SYSTEM: disorganized thinking, Motor Strength 5/5 B/L upper and lower extremities; DTRs 2+ intact and symmetric  CHEST/LUNG: No rales, rhonchi, wheezing, or rubs  HEART: Regular rate and rhythm; No murmurs, rubs, or gallops  ABDOMEN: Soft, Nontender, Nondistended; Bowel sounds present  EXTREMITIES:  2+ Peripheral Pulses, No clubbing, cyanosis, or edema  SKIN: No rashes or lesions    LABS:                        10.0   7.8   )-----------( 408      ( 30 Jul 2017 10:57 )             31.5     07-30    145  |  107  |  21<H>  ----------------------------<  72  4.4   |  30  |  1.07    Ca    8.5      30 Jul 2017 10:55  Phos  4.9     07-30  Mg     2.5     07-30    Culture & Sensitivity:   CAPILLARY BLOOD GLUCOSE  206 (31 Jul 2017 11:36)  138 (31 Jul 2017 08:20)  104 (30 Jul 2017 21:01)  295 (30 Jul 2017 16:48)    07-30 @ 07:01  -  07-31 @ 07:00  --------------------------------------------------------  IN: 0 mL / OUT: 1 mL / NET: -1 mL    RADIOLOGY & ADDITIONAL TESTS:  Radiology testing independently reviewed:     Consultant(s) Notes Reviewed:  [ x] YES  [ ] NO    Care Discussed with Consultants/Other Providers [ x] YES  [ ] NO

## 2017-07-31 NOTE — PROGRESS NOTE ADULT - ASSESSMENT
HPI:  72 F from home, lives with daughter, with HHA 5hours per day 5 days a week PMH of  Dementia, Depression, DM, HTN, HLD, CAD x3 stents last stent in Aug 2016, Glaucoma, legally blind, brought in by Daughter  with worsening dementia, weakness and not eating and drinking sufficiently since May/2017. Pt is sedated due to agitation and poor co-operation currently, not oriented. As per daughter pt is refusing to take any of her meds since 2 months  as she is not eating or drinking much, Patient recent admission in May with the same compliant and this time daughter was asking for long term care placement. Daughter stated that patient has been mostly bedbound for last 6 months as she keep having recurrent falls and unsteady gait. She occasionally went into kitchen but stays in bed most of the time. Daughter stated she does not want PEG tube or any other kind of artificial nutrition. Patient was DNR/DNI on previous admission and daughter wanted to wait until morning until her sister came in to sign the form.   In ED, vitals were stable and pt is hypokalemic to 2.5 without no acute EKG changes and NO U wave. Refusing medication. Patient was sedated and potassium riders were given. (17 Jul 2017 22:51) 72 F from home, lives with daughter, with HHA 5hours per day 5 days a week w/ PMH of  Dementia, Depression, DM, HTN, HLD, CAD x3 stents last stent in Aug 2016, Glaucoma, legally blind c/o worsening dementia, weakness and not eating and drinking sufficiently x 3 months. Pt was admitted for failure to thrive and inability to tolerate PO.

## 2017-07-31 NOTE — PROGRESS NOTE BEHAVIORAL HEALTH - NSBHCHARTREVIEWVS_PSY_A_CORE FT
Vital Signs Last 24 Hrs  T(C): 36.5 (31 Jul 2017 07:46), Max: 36.5 (31 Jul 2017 07:46)  T(F): 97.7 (31 Jul 2017 07:46), Max: 97.7 (31 Jul 2017 07:46)  HR: 91 (31 Jul 2017 07:46) (91 - 100)  BP: 149/48 (31 Jul 2017 07:46) (125/48 - 149/48)  BP(mean): --  RR: 16 (31 Jul 2017 07:46) (16 - 16)  SpO2: 100% (31 Jul 2017 07:46) (99% - 100%)

## 2017-08-01 PROCEDURE — 99233 SBSQ HOSP IP/OBS HIGH 50: CPT | Mod: GC

## 2017-08-01 RX ORDER — INSULIN GLARGINE 100 [IU]/ML
25 INJECTION, SOLUTION SUBCUTANEOUS AT BEDTIME
Qty: 0 | Refills: 0 | Status: DISCONTINUED | OUTPATIENT
Start: 2017-08-01 | End: 2017-08-02

## 2017-08-01 RX ADMIN — MIRTAZAPINE 15 MILLIGRAM(S): 45 TABLET, ORALLY DISINTEGRATING ORAL at 22:07

## 2017-08-01 RX ADMIN — Medication 1000 UNIT(S): at 11:55

## 2017-08-01 RX ADMIN — Medication 81 MILLIGRAM(S): at 11:55

## 2017-08-01 RX ADMIN — QUETIAPINE FUMARATE 12.5 MILLIGRAM(S): 200 TABLET, FILM COATED ORAL at 18:11

## 2017-08-01 RX ADMIN — QUETIAPINE FUMARATE 12.5 MILLIGRAM(S): 200 TABLET, FILM COATED ORAL at 06:10

## 2017-08-01 RX ADMIN — TICAGRELOR 90 MILLIGRAM(S): 90 TABLET ORAL at 18:11

## 2017-08-01 RX ADMIN — INSULIN GLARGINE 25 UNIT(S): 100 INJECTION, SOLUTION SUBCUTANEOUS at 22:09

## 2017-08-01 RX ADMIN — Medication 10 MILLIGRAM(S): at 18:11

## 2017-08-01 RX ADMIN — ATORVASTATIN CALCIUM 20 MILLIGRAM(S): 80 TABLET, FILM COATED ORAL at 22:07

## 2017-08-01 RX ADMIN — AMLODIPINE BESYLATE 10 MILLIGRAM(S): 2.5 TABLET ORAL at 06:10

## 2017-08-01 RX ADMIN — MEMANTINE HYDROCHLORIDE 10 MILLIGRAM(S): 10 TABLET ORAL at 22:07

## 2017-08-01 RX ADMIN — LISINOPRIL 20 MILLIGRAM(S): 2.5 TABLET ORAL at 06:10

## 2017-08-01 RX ADMIN — Medication 25 MILLIGRAM(S): at 06:11

## 2017-08-01 RX ADMIN — TICAGRELOR 90 MILLIGRAM(S): 90 TABLET ORAL at 06:11

## 2017-08-01 NOTE — PROGRESS NOTE ADULT - SUBJECTIVE AND OBJECTIVE BOX
PGY 1 Note discussed with supervising resident and primary attending    Patient is a 72y old  Female who presents with a chief complaint of worsening dementia, weakness and not eating and drinking (20 Jul 2017 11:40)      INTERVAL HPI/OVERNIGHT EVENTS: Pt was seen and examined at bedside. No acute events overnight. Pt was asleep when I saw her this AM. Per PCA (1 to 1 ob), pt was up and energized over night and fell asleep early AM.    MEDICATIONS  (STANDING):  enoxaparin Injectable 40 milliGRAM(s) SubCutaneous daily  aspirin  chewable 81 milliGRAM(s) Oral daily  lisinopril 20 milliGRAM(s) Oral daily  atorvastatin 20 milliGRAM(s) Oral at bedtime  ticagrelor 90 milliGRAM(s) Oral two times a day  metoprolol succinate ER 25 milliGRAM(s) Oral daily  amLODIPine   Tablet 10 milliGRAM(s) Oral daily  cholecalciferol 1000 Unit(s) Oral daily  insulin lispro (HumaLOG) corrective regimen sliding scale   SubCutaneous three times a day before meals  insulin lispro Injectable (HumaLOG) 6 Unit(s) SubCutaneous three times a day before meals  mirtazapine 15 milliGRAM(s) Oral at bedtime  mirtazapine 7.5 milliGRAM(s) Oral daily  memantine 10 milliGRAM(s) Oral at bedtime  QUEtiapine 12.5 milliGRAM(s) Oral two times a day  insulin glargine Injectable (LANTUS) 25 Unit(s) SubCutaneous at bedtime    MEDICATIONS  (PRN):      __________________________________________________  REVIEW OF SYSTEMS: Unable to elicit as pt is AAOx1         Vital Signs Last 24 Hrs  T(C): 36.7 (01 Aug 2017 01:40), Max: 36.7 (31 Jul 2017 15:35)  T(F): 98 (01 Aug 2017 01:40), Max: 98.1 (31 Jul 2017 15:35)  HR: 84 (01 Aug 2017 05:22) (84 - 98)  BP: 150/47 (01 Aug 2017 05:22) (116/47 - 150/47)  BP(mean): --  RR: 17 (01 Aug 2017 01:40) (16 - 17)  SpO2: 100% (01 Aug 2017 01:40) (99% - 100%)    ________________________________________________  PHYSICAL EXAM:  GENERAL: NAD  HEENT: Normocephalic;  conjunctivae and sclerae clear; moist mucous membranes;   NECK : supple  CHEST/LUNG: Clear to auscultation bilaterally with good air entry   HEART: S1 S2  regular; no murmurs, gallops or rubs  ABDOMEN: Soft, Nontender, Nondistended; Bowel sounds present  EXTREMITIES: no cyanosis; no edema; no calf tenderness  SKIN: warm and dry; no rash  NERVOUS SYSTEM:  AAO x 1    _________________________________________________      CAPILLARY BLOOD GLUCOSE  99 (01 Aug 2017 11:14)  78 (01 Aug 2017 08:12)  210 (31 Jul 2017 21:01)  249 (31 Jul 2017 16:45)        Care Discussed with PGY-2/Attending/Consultants :  YES    Plan of care was discussed with patient and /or primary care giver; all questions and concerns were addressed and care was aligned with patient's wishes. PGY 1 Note discussed with supervising resident and primary attending    Patient is a 72y old  Female who presents with a chief complaint of worsening dementia, weakness and not eating and drinking (20 Jul 2017 11:40)      INTERVAL HPI/OVERNIGHT EVENTS: Pt was seen and examined at bedside. No acute events overnight. Pt was asleep when I saw her this AM. Per PCA (1 to 1 ob), pt was up and energized over night and fell asleep early AM.    MEDICATIONS  (STANDING):  enoxaparin Injectable 40 milliGRAM(s) SubCutaneous daily  aspirin  chewable 81 milliGRAM(s) Oral daily  lisinopril 20 milliGRAM(s) Oral daily  atorvastatin 20 milliGRAM(s) Oral at bedtime  ticagrelor 90 milliGRAM(s) Oral two times a day  metoprolol succinate ER 25 milliGRAM(s) Oral daily  amLODIPine   Tablet 10 milliGRAM(s) Oral daily  cholecalciferol 1000 Unit(s) Oral daily  insulin lispro (HumaLOG) corrective regimen sliding scale   SubCutaneous three times a day before meals  insulin lispro Injectable (HumaLOG) 6 Unit(s) SubCutaneous three times a day before meals  mirtazapine 15 milliGRAM(s) Oral at bedtime  mirtazapine 7.5 milliGRAM(s) Oral daily  memantine 10 milliGRAM(s) Oral at bedtime  QUEtiapine 12.5 milliGRAM(s) Oral two times a day  insulin glargine Injectable (LANTUS) 25 Unit(s) SubCutaneous at bedtime    MEDICATIONS  (PRN):      __________________________________________________  REVIEW OF SYSTEMS: Unable to elicit as pt is AAOx1         Vital Signs Last 24 Hrs  T(C): 36.7 (01 Aug 2017 01:40), Max: 36.7 (31 Jul 2017 15:35)  T(F): 98 (01 Aug 2017 01:40), Max: 98.1 (31 Jul 2017 15:35)  HR: 84 (01 Aug 2017 05:22) (84 - 98)  BP: 150/47 (01 Aug 2017 05:22) (116/47 - 150/47)  BP(mean): --  RR: 17 (01 Aug 2017 01:40) (16 - 17)  SpO2: 100% (01 Aug 2017 01:40) (99% - 100%)    ________________________________________________  PHYSICAL EXAM:  GENERAL: NAD  HEENT: Legally blind. Normocephalic;  conjunctivae and sclerae clear; moist mucous membranes;   NECK : supple  CHEST/LUNG: Clear to auscultation bilaterally with good air entry   HEART: S1 S2  regular; no murmurs, gallops or rubs  ABDOMEN: Soft, Nontender, Nondistended; Bowel sounds present  EXTREMITIES: no cyanosis; no edema; no calf tenderness  SKIN: warm and dry; no rash  NERVOUS SYSTEM:  AAO x 1    _________________________________________________      CAPILLARY BLOOD GLUCOSE  99 (01 Aug 2017 11:14)  78 (01 Aug 2017 08:12)  210 (31 Jul 2017 21:01)  249 (31 Jul 2017 16:45)        Care Discussed with PGY-2/Attending/Consultants :  YES    Plan of care was discussed with patient and /or primary care giver; all questions and concerns were addressed and care was aligned with patient's wishes.

## 2017-08-01 NOTE — PROGRESS NOTE ADULT - PROBLEM SELECTOR PLAN 1
Psychiatry service Dr. Garcia followed pt. Appreciate advice.  Pt was agitated over the weekend and 1 to 1 constant observation was enforced.   c/w seroquel 12.5mg BID for agitation

## 2017-08-01 NOTE — PROGRESS NOTE ADULT - ASSESSMENT
72 F from home, lives with daughter, with HHA 5hours per day 5 days a week w/ PMH of  Dementia, Depression, DM, HTN, HLD, CAD x3 stents last stent in Aug 2016, Glaucoma, legally blind c/o worsening dementia, weakness and not eating and drinking sufficiently x 3 months. Pt was admitted for failure to thrive and inability to tolerate PO. Pt will go back home with HHA.

## 2017-08-01 NOTE — PROGRESS NOTE ADULT - PROBLEM SELECTOR PROBLEM 2
Dyslipidemia
Alzheimer's dementia without behavioral disturbance, unspecified timing of dementia onset
Alzheimer's dementia without behavioral disturbance, unspecified timing of dementia onset
Dyslipidemia
Electrolyte abnormality
Electrolyte abnormality
Dyslipidemia

## 2017-08-01 NOTE — PROGRESS NOTE ADULT - PROBLEM SELECTOR PROBLEM 4
Type 2 diabetes mellitus with hyperglycemia, with long-term current use of insulin
Type 2 diabetes mellitus with hyperglycemia, with long-term current use of insulin
Failure to thrive syndrome, adult
Failure to thrive syndrome, adult
Social problem
Social problem
Type 2 diabetes mellitus with hyperglycemia, with long-term current use of insulin

## 2017-08-01 NOTE — PROGRESS NOTE ADULT - PROBLEM SELECTOR PLAN 5
As pt was asleep and didn't eat much breakfast/lunch, humalog dose was withheld today  decrease lantus 25 units bedtime  c/w humalog 6units TID   HSS  AccuCheck  Diabetic diet

## 2017-08-01 NOTE — PROGRESS NOTE ADULT - PROBLEM SELECTOR PROBLEM 5
Hypokalemia
Hypertension
Hypertension
Hypokalemia
Type 2 diabetes mellitus with hyperglycemia, with long-term current use of insulin
Type 2 diabetes mellitus with hyperglycemia, with long-term current use of insulin
Hypokalemia

## 2017-08-01 NOTE — PROGRESS NOTE ADULT - PROBLEM SELECTOR PROBLEM 3
Failure to thrive syndrome, adult
Dyslipidemia
Dyslipidemia
Failure to thrive syndrome, adult
UTI (urinary tract infection)
UTI (urinary tract infection)
Failure to thrive syndrome, adult

## 2017-08-01 NOTE — PROGRESS NOTE ADULT - PROBLEM SELECTOR PROBLEM 1
Alzheimer's dementia without behavioral disturbance, unspecified timing of dementia onset
Agitation
Agitation
Alzheimer's dementia without behavioral disturbance, unspecified timing of dementia onset
Failure to thrive syndrome, adult
Failure to thrive syndrome, adult
Alzheimer's dementia without behavioral disturbance, unspecified timing of dementia onset

## 2017-08-01 NOTE — PROGRESS NOTE ADULT - PROBLEM SELECTOR PLAN 4
adjust lantus to 37 units in am and lispro to 6 units and continue to monitor
Agree to decrease dose of Lantus to 10-12 units at night if patient continues to refuse to eat  A1c 11.4: most likely attributed to medication non compliance as per family
Family wants long term placement.   - Daughter will sign DNR/DNI.  - Goal of care discussion with no artificial tube feeding.
Family wants long term placement.   - Daughter will sign DNR/DNI. f/u with her.   - Goal of care discussion with no artificial tube feeding.
Improved. Will monitor PO intake.
Resolved.
adjust lantus to 30 units in am and lispro continue to be 6 units,  will monitor blood sugars as patient went hypoglycemic this morning. discontinued prednisone.
adjust lantus to 37 units in am and lispro to 6 units and continue to monitor
adjust lantus to 37 units in am and lispro to 6 units and continue to monitor  296 in evening
continue Lantus but will decrease dose to 28 units.  continue with Lispro TID
continue current dose of lantus and lispro.  finger sticks agreable in am, continue to cover with sliding scale
continue current dose of lantus and lispro.  finger sticks agreable in am, continue to cover with sliding scale
will discontinue prednisone which is contributing to the hyperglycemia, and will decrease the lantus to 30 units in light of morning hypoglycemia.  We will likely need to titrate down the Insulin requirement.  continue lispro with sliding scale
will discontinue prednisone which is contributing to the hyperglycemia, and will decrease the lantus to 30 units in light of morning hypoglycemia.  We will likely need to titrate down the Insulin requirement.  continue lispro with sliding scale
will discontinue prednisone which is contributing to the hyperglycemia, and will decrease the lantus to 30 units in light of morning hypoglycemia.  We will likely need to titrate down the Insulin requirement.  continue lispro with sliding scale  finger tip 159 today
Agree to decrease dose of Lantus to 10-12 units at night if patient continues to refuse to eat  A1c 11.4: most likely attributed to medication non compliance as per family

## 2017-08-01 NOTE — PROGRESS NOTE ADULT - PROBLEM SELECTOR PROBLEM 6
Depression, unspecified depression type
Diabetes
Diabetes
Hypokalemia
Hypokalemia
Depression, unspecified depression type

## 2017-08-01 NOTE — PROGRESS NOTE ADULT - ATTENDING COMMENTS
Patient seen and examined at bedside, feeling little upset, was on 1;1 . Psych evaluated yesterday, patient is relatively better, not agitated, will dc.1:1 .  Today she is not feeling like eating, her finger sticks continues to be relatively low, will decrease Lantus to 25 units at night, however if she continues to refuse to eat, will further go down to 12 units only.  Physical exam:   vitals stable, rest as above  A/P:  1- failure to thrive: again patient starting to refuse to eat, family refusing any peg tube placement.  Restart prednisone 10 mg daily, and will cut down dose of lantus to 25 units in light of morning hypoglycemia, however, if she continues to refuse to eat dinner then we need to cut down Lantus to 12 units.    2- Dementia with depression with behavioral disturbance: seroquel, psych input appreciated,  continue with Remeron.    rest as above    DC planning to Long term rehab

## 2017-08-01 NOTE — PROGRESS NOTE ADULT - PROBLEM SELECTOR PROBLEM 7
Severe protein-calorie malnutrition
Depression, unspecified depression type
Depression, unspecified depression type
Need for prophylactic measure
Need for prophylactic measure
Severe protein-calorie malnutrition

## 2017-08-02 VITALS
SYSTOLIC BLOOD PRESSURE: 131 MMHG | OXYGEN SATURATION: 99 % | HEART RATE: 73 BPM | RESPIRATION RATE: 16 BRPM | TEMPERATURE: 98 F | DIASTOLIC BLOOD PRESSURE: 55 MMHG

## 2017-08-02 PROCEDURE — 83550 IRON BINDING TEST: CPT

## 2017-08-02 PROCEDURE — 80048 BASIC METABOLIC PNL TOTAL CA: CPT

## 2017-08-02 PROCEDURE — 84100 ASSAY OF PHOSPHORUS: CPT

## 2017-08-02 PROCEDURE — 85045 AUTOMATED RETICULOCYTE COUNT: CPT

## 2017-08-02 PROCEDURE — 93005 ELECTROCARDIOGRAM TRACING: CPT

## 2017-08-02 PROCEDURE — 96372 THER/PROPH/DIAG INJ SC/IM: CPT

## 2017-08-02 PROCEDURE — 87086 URINE CULTURE/COLONY COUNT: CPT

## 2017-08-02 PROCEDURE — 85027 COMPLETE CBC AUTOMATED: CPT

## 2017-08-02 PROCEDURE — 71045 X-RAY EXAM CHEST 1 VIEW: CPT

## 2017-08-02 PROCEDURE — 80061 LIPID PANEL: CPT

## 2017-08-02 PROCEDURE — 83615 LACTATE (LD) (LDH) ENZYME: CPT

## 2017-08-02 PROCEDURE — 80053 COMPREHEN METABOLIC PANEL: CPT

## 2017-08-02 PROCEDURE — 97530 THERAPEUTIC ACTIVITIES: CPT

## 2017-08-02 PROCEDURE — 82746 ASSAY OF FOLIC ACID SERUM: CPT

## 2017-08-02 PROCEDURE — 36415 COLL VENOUS BLD VENIPUNCTURE: CPT

## 2017-08-02 PROCEDURE — 83036 HEMOGLOBIN GLYCOSYLATED A1C: CPT

## 2017-08-02 PROCEDURE — 82607 VITAMIN B-12: CPT

## 2017-08-02 PROCEDURE — 87040 BLOOD CULTURE FOR BACTERIA: CPT

## 2017-08-02 PROCEDURE — 99285 EMERGENCY DEPT VISIT HI MDM: CPT | Mod: 25

## 2017-08-02 PROCEDURE — 81001 URINALYSIS AUTO W/SCOPE: CPT

## 2017-08-02 PROCEDURE — 83735 ASSAY OF MAGNESIUM: CPT

## 2017-08-02 PROCEDURE — 97110 THERAPEUTIC EXERCISES: CPT

## 2017-08-02 PROCEDURE — 99239 HOSP IP/OBS DSCHRG MGMT >30: CPT

## 2017-08-02 PROCEDURE — 97162 PT EVAL MOD COMPLEX 30 MIN: CPT

## 2017-08-02 RX ORDER — QUETIAPINE FUMARATE 200 MG/1
12.5 TABLET, FILM COATED ORAL
Qty: 0 | Refills: 0 | DISCHARGE
Start: 2017-08-02

## 2017-08-02 RX ORDER — QUETIAPINE FUMARATE 200 MG/1
5 TABLET, FILM COATED ORAL
Qty: 0 | Refills: 0 | DISCHARGE
Start: 2017-08-02

## 2017-08-02 RX ADMIN — Medication 1: at 11:38

## 2017-08-02 RX ADMIN — Medication 1000 UNIT(S): at 11:38

## 2017-08-02 RX ADMIN — AMLODIPINE BESYLATE 10 MILLIGRAM(S): 2.5 TABLET ORAL at 06:41

## 2017-08-02 RX ADMIN — TICAGRELOR 90 MILLIGRAM(S): 90 TABLET ORAL at 06:41

## 2017-08-02 RX ADMIN — Medication 1: at 08:43

## 2017-08-02 RX ADMIN — LISINOPRIL 20 MILLIGRAM(S): 2.5 TABLET ORAL at 06:41

## 2017-08-02 RX ADMIN — Medication 6 UNIT(S): at 11:38

## 2017-08-02 RX ADMIN — Medication 81 MILLIGRAM(S): at 11:38

## 2017-08-02 RX ADMIN — Medication 10 MILLIGRAM(S): at 06:41

## 2017-08-02 RX ADMIN — Medication 25 MILLIGRAM(S): at 06:41

## 2017-08-02 RX ADMIN — QUETIAPINE FUMARATE 12.5 MILLIGRAM(S): 200 TABLET, FILM COATED ORAL at 06:41

## 2017-08-02 RX ADMIN — Medication 6 UNIT(S): at 08:43

## 2017-08-07 DIAGNOSIS — F32.9 MAJOR DEPRESSIVE DISORDER, SINGLE EPISODE, UNSPECIFIED: ICD-10-CM

## 2017-08-07 DIAGNOSIS — Z66 DO NOT RESUSCITATE: ICD-10-CM

## 2017-08-07 DIAGNOSIS — E83.39 OTHER DISORDERS OF PHOSPHORUS METABOLISM: ICD-10-CM

## 2017-08-07 DIAGNOSIS — Z79.84 LONG TERM (CURRENT) USE OF ORAL HYPOGLYCEMIC DRUGS: ICD-10-CM

## 2017-08-07 DIAGNOSIS — E86.0 DEHYDRATION: ICD-10-CM

## 2017-08-07 DIAGNOSIS — Z79.4 LONG TERM (CURRENT) USE OF INSULIN: ICD-10-CM

## 2017-08-07 DIAGNOSIS — F02.80 DEMENTIA IN OTHER DISEASES CLASSIFIED ELSEWHERE, UNSPECIFIED SEVERITY, WITHOUT BEHAVIORAL DISTURBANCE, PSYCHOTIC DISTURBANCE, MOOD DISTURBANCE, AND ANXIETY: ICD-10-CM

## 2017-08-07 DIAGNOSIS — I25.10 ATHEROSCLEROTIC HEART DISEASE OF NATIVE CORONARY ARTERY WITHOUT ANGINA PECTORIS: ICD-10-CM

## 2017-08-07 DIAGNOSIS — Z95.5 PRESENCE OF CORONARY ANGIOPLASTY IMPLANT AND GRAFT: ICD-10-CM

## 2017-08-07 DIAGNOSIS — Z91.14 PATIENT'S OTHER NONCOMPLIANCE WITH MEDICATION REGIMEN: ICD-10-CM

## 2017-08-07 DIAGNOSIS — I10 ESSENTIAL (PRIMARY) HYPERTENSION: ICD-10-CM

## 2017-08-07 DIAGNOSIS — G30.8 OTHER ALZHEIMER'S DISEASE: ICD-10-CM

## 2017-08-07 DIAGNOSIS — E87.6 HYPOKALEMIA: ICD-10-CM

## 2017-08-07 DIAGNOSIS — N39.0 URINARY TRACT INFECTION, SITE NOT SPECIFIED: ICD-10-CM

## 2017-08-07 DIAGNOSIS — E78.5 HYPERLIPIDEMIA, UNSPECIFIED: ICD-10-CM

## 2017-08-07 DIAGNOSIS — Z91.81 HISTORY OF FALLING: ICD-10-CM

## 2017-08-07 DIAGNOSIS — R62.7 ADULT FAILURE TO THRIVE: ICD-10-CM

## 2017-08-07 DIAGNOSIS — E43 UNSPECIFIED SEVERE PROTEIN-CALORIE MALNUTRITION: ICD-10-CM

## 2017-08-07 DIAGNOSIS — Z79.82 LONG TERM (CURRENT) USE OF ASPIRIN: ICD-10-CM

## 2017-08-07 DIAGNOSIS — H54.8 LEGAL BLINDNESS, AS DEFINED IN USA: ICD-10-CM

## 2017-08-07 DIAGNOSIS — E87.8 OTHER DISORDERS OF ELECTROLYTE AND FLUID BALANCE, NOT ELSEWHERE CLASSIFIED: ICD-10-CM

## 2017-08-07 DIAGNOSIS — E11.65 TYPE 2 DIABETES MELLITUS WITH HYPERGLYCEMIA: ICD-10-CM

## 2019-09-30 NOTE — ED PROVIDER NOTE - NS_ATTENDINGSCRIBE_ED_ALL_ED
Size Of Lesion In Cm (Optional): 0
Body Location Override (Optional - Billing Will Still Be Based On Selected Body Map Location If Applicable): left upper arm
Detail Level: Detailed
Body Location Override (Optional - Billing Will Still Be Based On Selected Body Map Location If Applicable): left forearm
I personally performed the service described in the documentation recorded by the scribe in my presence, and it accurately and completely records my words and actions.

## 2019-11-13 ENCOUNTER — INPATIENT (INPATIENT)
Facility: HOSPITAL | Age: 74
LOS: 4 days | Discharge: HOME | DRG: 637 | End: 2019-11-18
Attending: INTERNAL MEDICINE | Admitting: INTERNAL MEDICINE
Payer: COMMERCIAL

## 2019-11-13 VITALS
OXYGEN SATURATION: 95 % | SYSTOLIC BLOOD PRESSURE: 187 MMHG | DIASTOLIC BLOOD PRESSURE: 70 MMHG | RESPIRATION RATE: 19 BRPM | HEART RATE: 100 BPM

## 2019-11-13 DIAGNOSIS — Z86.79 PERSONAL HISTORY OF OTHER DISEASES OF THE CIRCULATORY SYSTEM: ICD-10-CM

## 2019-11-13 DIAGNOSIS — F03.90 UNSPECIFIED DEMENTIA WITHOUT BEHAVIORAL DISTURBANCE: ICD-10-CM

## 2019-11-13 DIAGNOSIS — E11.9 TYPE 2 DIABETES MELLITUS WITHOUT COMPLICATIONS: ICD-10-CM

## 2019-11-13 DIAGNOSIS — Z29.9 ENCOUNTER FOR PROPHYLACTIC MEASURES, UNSPECIFIED: ICD-10-CM

## 2019-11-13 DIAGNOSIS — E11.65 TYPE 2 DIABETES MELLITUS WITH HYPERGLYCEMIA: ICD-10-CM

## 2019-11-13 DIAGNOSIS — I25.10 ATHEROSCLEROTIC HEART DISEASE OF NATIVE CORONARY ARTERY WITHOUT ANGINA PECTORIS: ICD-10-CM

## 2019-11-13 DIAGNOSIS — R73.9 HYPERGLYCEMIA, UNSPECIFIED: ICD-10-CM

## 2019-11-13 LAB
ACETONE SERPL-MCNC: NEGATIVE — SIGNIFICANT CHANGE UP
ALBUMIN SERPL ELPH-MCNC: 3.5 G/DL — SIGNIFICANT CHANGE UP (ref 3.5–5)
ALP SERPL-CCNC: 121 U/L — HIGH (ref 40–120)
ALT FLD-CCNC: 18 U/L DA — SIGNIFICANT CHANGE UP (ref 10–60)
ANION GAP SERPL CALC-SCNC: 7 MMOL/L — SIGNIFICANT CHANGE UP (ref 5–17)
APPEARANCE UR: CLEAR — SIGNIFICANT CHANGE UP
APTT BLD: 27.6 SEC — SIGNIFICANT CHANGE UP (ref 27.5–36.3)
AST SERPL-CCNC: 11 U/L — SIGNIFICANT CHANGE UP (ref 10–40)
BILIRUB SERPL-MCNC: 0.4 MG/DL — SIGNIFICANT CHANGE UP (ref 0.2–1.2)
BILIRUB UR-MCNC: NEGATIVE — SIGNIFICANT CHANGE UP
BUN SERPL-MCNC: 23 MG/DL — HIGH (ref 7–18)
CALCIUM SERPL-MCNC: 9.4 MG/DL — SIGNIFICANT CHANGE UP (ref 8.4–10.5)
CHLORIDE SERPL-SCNC: 95 MMOL/L — LOW (ref 96–108)
CO2 SERPL-SCNC: 31 MMOL/L — SIGNIFICANT CHANGE UP (ref 22–31)
COLOR SPEC: YELLOW — SIGNIFICANT CHANGE UP
CREAT SERPL-MCNC: 1.2 MG/DL — SIGNIFICANT CHANGE UP (ref 0.5–1.3)
DIFF PNL FLD: NEGATIVE — SIGNIFICANT CHANGE UP
GLUCOSE BLDC GLUCOMTR-MCNC: 119 MG/DL — HIGH (ref 70–99)
GLUCOSE BLDC GLUCOMTR-MCNC: 138 MG/DL — HIGH (ref 70–99)
GLUCOSE BLDC GLUCOMTR-MCNC: 168 MG/DL — HIGH (ref 70–99)
GLUCOSE BLDC GLUCOMTR-MCNC: 197 MG/DL — HIGH (ref 70–99)
GLUCOSE BLDC GLUCOMTR-MCNC: 214 MG/DL — HIGH (ref 70–99)
GLUCOSE BLDC GLUCOMTR-MCNC: 390 MG/DL — HIGH (ref 70–99)
GLUCOSE SERPL-MCNC: 548 MG/DL — CRITICAL HIGH (ref 70–99)
GLUCOSE UR QL: 1000 MG/DL
HBA1C BLD-MCNC: >15.5 % — HIGH (ref 4–5.6)
HCT VFR BLD CALC: 41.6 % — SIGNIFICANT CHANGE UP (ref 34.5–45)
HGB BLD-MCNC: 13.9 G/DL — SIGNIFICANT CHANGE UP (ref 11.5–15.5)
INR BLD: 0.84 RATIO — LOW (ref 0.88–1.16)
KETONES UR-MCNC: NEGATIVE — SIGNIFICANT CHANGE UP
LEUKOCYTE ESTERASE UR-ACNC: NEGATIVE — SIGNIFICANT CHANGE UP
MCHC RBC-ENTMCNC: 29.2 PG — SIGNIFICANT CHANGE UP (ref 27–34)
MCHC RBC-ENTMCNC: 33.4 GM/DL — SIGNIFICANT CHANGE UP (ref 32–36)
MCV RBC AUTO: 87.4 FL — SIGNIFICANT CHANGE UP (ref 80–100)
NITRITE UR-MCNC: NEGATIVE — SIGNIFICANT CHANGE UP
NRBC # BLD: 0 /100 WBCS — SIGNIFICANT CHANGE UP (ref 0–0)
PH UR: 6 — SIGNIFICANT CHANGE UP (ref 5–8)
PLATELET # BLD AUTO: 277 K/UL — SIGNIFICANT CHANGE UP (ref 150–400)
POTASSIUM SERPL-MCNC: 5 MMOL/L — SIGNIFICANT CHANGE UP (ref 3.5–5.3)
POTASSIUM SERPL-SCNC: 5 MMOL/L — SIGNIFICANT CHANGE UP (ref 3.5–5.3)
PROT SERPL-MCNC: 7.2 G/DL — SIGNIFICANT CHANGE UP (ref 6–8.3)
PROT UR-MCNC: 15
PROTHROM AB SERPL-ACNC: 9.3 SEC — LOW (ref 10–12.9)
RBC # BLD: 4.76 M/UL — SIGNIFICANT CHANGE UP (ref 3.8–5.2)
RBC # FLD: 12.4 % — SIGNIFICANT CHANGE UP (ref 10.3–14.5)
SODIUM SERPL-SCNC: 133 MMOL/L — LOW (ref 135–145)
SP GR SPEC: 1.01 — SIGNIFICANT CHANGE UP (ref 1.01–1.02)
TROPONIN I SERPL-MCNC: <0.015 NG/ML — SIGNIFICANT CHANGE UP (ref 0–0.04)
UROBILINOGEN FLD QL: NEGATIVE — SIGNIFICANT CHANGE UP
WBC # BLD: 5.08 K/UL — SIGNIFICANT CHANGE UP (ref 3.8–10.5)
WBC # FLD AUTO: 5.08 K/UL — SIGNIFICANT CHANGE UP (ref 3.8–10.5)

## 2019-11-13 PROCEDURE — 71045 X-RAY EXAM CHEST 1 VIEW: CPT | Mod: 26

## 2019-11-13 PROCEDURE — 99223 1ST HOSP IP/OBS HIGH 75: CPT

## 2019-11-13 PROCEDURE — 99285 EMERGENCY DEPT VISIT HI MDM: CPT

## 2019-11-13 RX ORDER — QUETIAPINE FUMARATE 200 MG/1
50 TABLET, FILM COATED ORAL ONCE
Refills: 0 | Status: COMPLETED | OUTPATIENT
Start: 2019-11-13 | End: 2019-11-13

## 2019-11-13 RX ORDER — DEXTROSE 50 % IN WATER 50 %
25 SYRINGE (ML) INTRAVENOUS ONCE
Refills: 0 | Status: DISCONTINUED | OUTPATIENT
Start: 2019-11-13 | End: 2019-11-13

## 2019-11-13 RX ORDER — SODIUM CHLORIDE 9 MG/ML
1000 INJECTION INTRAMUSCULAR; INTRAVENOUS; SUBCUTANEOUS
Refills: 0 | Status: DISCONTINUED | OUTPATIENT
Start: 2019-11-13 | End: 2019-11-16

## 2019-11-13 RX ORDER — SODIUM CHLORIDE 9 MG/ML
2000 INJECTION INTRAMUSCULAR; INTRAVENOUS; SUBCUTANEOUS ONCE
Refills: 0 | Status: COMPLETED | OUTPATIENT
Start: 2019-11-13 | End: 2019-11-13

## 2019-11-13 RX ORDER — ASPIRIN/CALCIUM CARB/MAGNESIUM 324 MG
81 TABLET ORAL DAILY
Refills: 0 | Status: DISCONTINUED | OUTPATIENT
Start: 2019-11-13 | End: 2019-11-18

## 2019-11-13 RX ORDER — DEXTROSE 50 % IN WATER 50 %
15 SYRINGE (ML) INTRAVENOUS ONCE
Refills: 0 | Status: DISCONTINUED | OUTPATIENT
Start: 2019-11-13 | End: 2019-11-13

## 2019-11-13 RX ORDER — ACETAMINOPHEN 500 MG
650 TABLET ORAL EVERY 6 HOURS
Refills: 0 | Status: DISCONTINUED | OUTPATIENT
Start: 2019-11-13 | End: 2019-11-18

## 2019-11-13 RX ORDER — SODIUM CHLORIDE 9 MG/ML
1000 INJECTION, SOLUTION INTRAVENOUS
Refills: 0 | Status: DISCONTINUED | OUTPATIENT
Start: 2019-11-13 | End: 2019-11-13

## 2019-11-13 RX ORDER — INSULIN HUMAN 100 [IU]/ML
8 INJECTION, SOLUTION SUBCUTANEOUS ONCE
Refills: 0 | Status: COMPLETED | OUTPATIENT
Start: 2019-11-13 | End: 2019-11-13

## 2019-11-13 RX ORDER — DEXTROSE 50 % IN WATER 50 %
12.5 SYRINGE (ML) INTRAVENOUS ONCE
Refills: 0 | Status: DISCONTINUED | OUTPATIENT
Start: 2019-11-13 | End: 2019-11-13

## 2019-11-13 RX ORDER — SODIUM CHLORIDE 9 MG/ML
3 INJECTION INTRAMUSCULAR; INTRAVENOUS; SUBCUTANEOUS ONCE
Refills: 0 | Status: COMPLETED | OUTPATIENT
Start: 2019-11-13 | End: 2019-11-13

## 2019-11-13 RX ORDER — GLUCAGON INJECTION, SOLUTION 0.5 MG/.1ML
1 INJECTION, SOLUTION SUBCUTANEOUS ONCE
Refills: 0 | Status: DISCONTINUED | OUTPATIENT
Start: 2019-11-13 | End: 2019-11-13

## 2019-11-13 RX ORDER — MEMANTINE HYDROCHLORIDE 10 MG/1
5 TABLET ORAL
Refills: 0 | Status: DISCONTINUED | OUTPATIENT
Start: 2019-11-13 | End: 2019-11-18

## 2019-11-13 RX ORDER — QUETIAPINE FUMARATE 200 MG/1
50 TABLET, FILM COATED ORAL DAILY
Refills: 0 | Status: DISCONTINUED | OUTPATIENT
Start: 2019-11-13 | End: 2019-11-14

## 2019-11-13 RX ORDER — INSULIN LISPRO 100/ML
VIAL (ML) SUBCUTANEOUS EVERY 6 HOURS
Refills: 0 | Status: DISCONTINUED | OUTPATIENT
Start: 2019-11-13 | End: 2019-11-13

## 2019-11-13 RX ORDER — INSULIN LISPRO 100/ML
VIAL (ML) SUBCUTANEOUS
Refills: 0 | Status: DISCONTINUED | OUTPATIENT
Start: 2019-11-13 | End: 2019-11-18

## 2019-11-13 RX ORDER — HEPARIN SODIUM 5000 [USP'U]/ML
5000 INJECTION INTRAVENOUS; SUBCUTANEOUS EVERY 12 HOURS
Refills: 0 | Status: DISCONTINUED | OUTPATIENT
Start: 2019-11-13 | End: 2019-11-18

## 2019-11-13 RX ORDER — INSULIN GLARGINE 100 [IU]/ML
24 INJECTION, SOLUTION SUBCUTANEOUS
Qty: 0 | Refills: 0 | DISCHARGE

## 2019-11-13 RX ORDER — INSULIN LISPRO 100/ML
VIAL (ML) SUBCUTANEOUS AT BEDTIME
Refills: 0 | Status: DISCONTINUED | OUTPATIENT
Start: 2019-11-13 | End: 2019-11-18

## 2019-11-13 RX ORDER — ATORVASTATIN CALCIUM 80 MG/1
40 TABLET, FILM COATED ORAL AT BEDTIME
Refills: 0 | Status: DISCONTINUED | OUTPATIENT
Start: 2019-11-13 | End: 2019-11-18

## 2019-11-13 RX ADMIN — HEPARIN SODIUM 5000 UNIT(S): 5000 INJECTION INTRAVENOUS; SUBCUTANEOUS at 17:41

## 2019-11-13 RX ADMIN — SODIUM CHLORIDE 75 MILLILITER(S): 9 INJECTION INTRAMUSCULAR; INTRAVENOUS; SUBCUTANEOUS at 09:57

## 2019-11-13 RX ADMIN — QUETIAPINE FUMARATE 50 MILLIGRAM(S): 200 TABLET, FILM COATED ORAL at 09:51

## 2019-11-13 RX ADMIN — SODIUM CHLORIDE 3 MILLILITER(S): 9 INJECTION INTRAMUSCULAR; INTRAVENOUS; SUBCUTANEOUS at 05:16

## 2019-11-13 RX ADMIN — Medication 2: at 18:28

## 2019-11-13 RX ADMIN — SODIUM CHLORIDE 2000 MILLILITER(S): 9 INJECTION INTRAMUSCULAR; INTRAVENOUS; SUBCUTANEOUS at 06:16

## 2019-11-13 RX ADMIN — INSULIN HUMAN 8 UNIT(S): 100 INJECTION, SOLUTION SUBCUTANEOUS at 06:58

## 2019-11-13 RX ADMIN — SODIUM CHLORIDE 2000 MILLILITER(S): 9 INJECTION INTRAMUSCULAR; INTRAVENOUS; SUBCUTANEOUS at 05:15

## 2019-11-13 NOTE — H&P ADULT - PROBLEM SELECTOR PLAN 4
worsening dementia with agitation  on memantine and seroquel at home  c/w same meds  Psych. Dr. Caldera consulted  Palliative consulted to help in goals of care and dispo not taking any meds at home  will start asa and statin

## 2019-11-13 NOTE — ED ADULT NURSE NOTE - CHIEF COMPLAINT QUOTE
high blood sugar at home ,as per daughter pt non compliant with her medications and insulin,has been combative for the past 8 weeks. as per daughter it has some peaks become combative then sleep a lot for 2 days then come back again

## 2019-11-13 NOTE — ED ADULT NURSE NOTE - CHPI ED NUR SYMPTOMS NEG
no tingling/no weakness/no vomiting/no chills/no decreased eating/drinking/no pain/no dizziness/no fever/no nausea

## 2019-11-13 NOTE — ED ADULT TRIAGE NOTE - CHIEF COMPLAINT QUOTE
high blood sugar at home ,as per daughter pt non compliant with her medications and insulin,has combative for the past 8 weeks. as per daughter it has some peaks become combative then sleep a lot for 2 days then come back again high blood sugar at home ,as per daughter pt non compliant with her medications and insulin,has been combative for the past 8 weeks. as per daughter it has some peaks become combative then sleep a lot for 2 days then come back again

## 2019-11-13 NOTE — H&P ADULT - HISTORY OF PRESENT ILLNESS
74F from home, lives with daughter and , PMHx of dementia, CAD s/p 3 stent, HTN, DM, legally blind was brought in by family due to severe agitation and high blood glucose. On my eval, pt. was AAO X2, states her daughter did not give her any food when she was hungry and send her to hospital. Spoke to daughter, Dora Graf over phone. She states that pt. had last stent placed 4 yrs ago and since then, she has dementia, which is getting worst, she is mostly agitated, does not sleep well whole night and does not let other people sleep too. She refuses to take any meds, however family crushes seroquel and memantine and gives it with juice. Her blood glucose has been always uncontrolled and she refuses to take insulin as prescribed. Denies any fever, N/V, urinary or bowel complaint, cough, pain anywhere.

## 2019-11-13 NOTE — H&P ADULT - PROBLEM SELECTOR PLAN 5
IMPROVE VTE Individual Risk Assessment  RISK                                                                Points  [  ] Previous VTE                                                  3  [  ] Thrombophilia                                               2  [  ] Lower limb paralysis                                      2        (unable to hold up >15 seconds)    [  ] Current Cancer                                              2         (within 6 months)  [x  ] Immobilization > 24 hrs                                1  [  ] ICU/CCU stay > 24 hours                              1  [x  ] Age > 60                                                      1  IMPROVE VTE Score _________2,   heparin for DVT proph worsening dementia with agitation  on memantine and seroquel at home  c/w same meds  Psych. Dr. Caldera consulted  Palliative consulted to help in goals of care and dispo

## 2019-11-13 NOTE — H&P ADULT - NSHPPHYSICALEXAM_GEN_ALL_CORE
PHYSICAL EXAM:  GENERAL: NAD,   HEENT: Normocephalic;  conjunctivae and sclerae clear; moist mucous membranes;   NECK : supple  CHEST/LUNG: Clear to auscultation bilaterally with good air entry   HEART: S1 S2  regular; no murmurs, gallops or rubs  ABDOMEN: Soft, Nontender, Nondistended; Bowel sounds present  EXTREMITIES: no cyanosis; no edema; no calf tenderness  SKIN: warm and dry; no rash  NERVOUS SYSTEM:  AAO X2 ; no new deficits

## 2019-11-13 NOTE — H&P ADULT - PROBLEM SELECTOR PLAN 1
p/w blood glucose 592 with no anion gap  As per daughter, pt. refused taking insulin since 3-4 yrs  s/p regular insulin 8 units in ED  c/w HSS   blood gluocse well controlled now, will add lantus if blood gucose persisitently >200  FU A1C  Monitor fingerstick and adjust meds

## 2019-11-13 NOTE — ED PROVIDER NOTE - CLINICAL SUMMARY MEDICAL DECISION MAKING FREE TEXT BOX
Pt noncompliant with medications with uncontrolled DM, AG, and ketones wnl -not in DKA.   MAR and Dr. Nunes endorsed. Pt agrees with admission. I had a detailed discussion with the patient's  regarding the historical points, exam findings, and any diagnostic results supporting the admit diagnosis.

## 2019-11-13 NOTE — ED PROVIDER NOTE - OBJECTIVE STATEMENT
Pt with hx of dementia and diabetes, as per  pt is noncompliant with all medications and has been ,mor agitated tonight. No fever, no vomiting.   Pt with elevated FSG >500 on triage. Pt with hx of dementia and diabetes, as per  pt is noncompliant with all medications and has been more agitated tonight. No fever, no vomiting.   Pt with elevated FSG >500 on triage.

## 2019-11-13 NOTE — H&P ADULT - ATTENDING COMMENTS
Patient seen and examined ; case was discussed with the admitting resident    ROS: as in the HPI; all other ROS negative    SH and family history as above    Vital Signs Last 24 Hrs  T(C): 36.5 (13 Nov 2019 06:17), Max: 36.5 (13 Nov 2019 06:17)  T(F): 97.7 (13 Nov 2019 06:17), Max: 97.7 (13 Nov 2019 06:17)  HR: 89 (13 Nov 2019 06:17) (89 - 100)  BP: 162/89 (13 Nov 2019 06:17) (162/89 - 187/70)  BP(mean): --  RR: 18 (13 Nov 2019 06:17) (18 - 19)  SpO2: 99% (13 Nov 2019 06:17) (95% - 99%)    GEN: NAD  HEENT- normocephalic; mouth dry, bitemporal wasting   CVS- S1S2+  LUNGS- clear to auscultation; no wheezing  ABD: Soft , nontender, nondistended, Bowel sounds are present  EXTREMITY: no calf tenderness, no cyanosis, no edema  NEURO: AAOx1 to self, poor attention span  PSYCH: agitated, speech coherent but illogical   BACK: no swelling or mass;   VASCULAR: ++ distal peripheral pulses  SKIN: warm and dry.       Labs Reviewed:                         13.9   5.08  )-----------( 277      ( 13 Nov 2019 05:07 )             41.6     11-13    133<L>  |  95<L>  |  23<H>  ----------------------------<  548<HH>  5.0   |  31  |  1.20    Ca    9.4      13 Nov 2019 05:07    TPro  7.2  /  Alb  3.5  /  TBili  0.4  /  DBili  x   /  AST  11  /  ALT  18  /  AlkPhos  121<H>  11-13    CARDIAC MARKERS ( 13 Nov 2019 05:07 )  <0.015 ng/mL / x     / x     / x     / x        MEDICATIONS  (PRN):  QUEtiapine 50 milliGRAM(s) Oral once PRN agitation, aggressive behavior      CXR reviewed  EKG Reviewed        75 y/o F with dementia with behavioral disturbance, CAD s/p PCI x3, DMII, failure to thrive, glaucoma and legal blindness, admitted with agitation and hyperglycemia. Hx taken from previous documentation and  at bedside 2/2 patient condition, agitated, screaming in ED, minimally redirectable. Patient does not take any medications or insulin for some time.    1. Hyperglycemia- no evidence of DKA, resume basal dose lantus, resume diet once glucose<300, continue IVF for now, check A1C, consider endocrine consultation if labile or not responding to initial tx   2. Encephaloapthy- toxic metabolic due to dehydration, hyperglycemia, can check UA but suspect worsening dementia contributing, check b12, rpr, tsh.  denies acute sx except one episode of diarrhea.  3. Dementia with behavioral disturbance- recommended seroquel and haldol prn severe agitation/ imminent harm to self or others, will try Seroquel now and reassess. Avoid anticholinergics, benzodiazepines, limit lines/tubes/tethers/restraints, encourage family presence and frequent reorientation/reassurance by staff, encourage good sleep hygiene, adequate lighting. Would consult palliative care, SW, known to service from previous admissions.   4. Dehydration   5. CAD s/p PCI- asa and statin if patient amenable, no known recent stents will defer dapt as a result   6. Severe protein calorie malnutrition, failure to thrive     Plan of care discussed with patient ;  all questions and concerns were addressed.  Discussed with Patient's family,  at bedside Patient seen and examined ; case was discussed with the admitting resident    ROS: as in the HPI; all other ROS negative    SH and family history as above, family/social hx unable to obtain 2/2 patient condition     Vital Signs Last 24 Hrs  T(C): 36.5 (13 Nov 2019 06:17), Max: 36.5 (13 Nov 2019 06:17)  T(F): 97.7 (13 Nov 2019 06:17), Max: 97.7 (13 Nov 2019 06:17)  HR: 89 (13 Nov 2019 06:17) (89 - 100)  BP: 162/89 (13 Nov 2019 06:17) (162/89 - 187/70)  BP(mean): --  RR: 18 (13 Nov 2019 06:17) (18 - 19)  SpO2: 99% (13 Nov 2019 06:17) (95% - 99%)    GEN: NAD  HEENT- normocephalic; mouth dry, bitemporal wasting   CVS- S1S2+  LUNGS- clear to auscultation; no wheezing  ABD: Soft , nontender, nondistended, Bowel sounds are present  EXTREMITY: no calf tenderness, no cyanosis, no edema  NEURO: AAOx1 to self, poor attention span  PSYCH: agitated, speech coherent but illogical   BACK: no swelling or mass;   VASCULAR: ++ distal peripheral pulses  SKIN: warm and dry.       Labs Reviewed:                         13.9   5.08  )-----------( 277      ( 13 Nov 2019 05:07 )             41.6     11-13    133<L>  |  95<L>  |  23<H>  ----------------------------<  548<HH>  5.0   |  31  |  1.20    Ca    9.4      13 Nov 2019 05:07    TPro  7.2  /  Alb  3.5  /  TBili  0.4  /  DBili  x   /  AST  11  /  ALT  18  /  AlkPhos  121<H>  11-13    CARDIAC MARKERS ( 13 Nov 2019 05:07 )  <0.015 ng/mL / x     / x     / x     / x        MEDICATIONS  (PRN):  QUEtiapine 50 milliGRAM(s) Oral once PRN agitation, aggressive behavior      CXR reviewed  EKG Reviewed  73 y/o F with dementia with behavioral disturbance, CAD s/p PCI x3, DMII, failure to thrive, glaucoma and legal blindness, admitted with agitation and hyperglycemia. Hx taken from previous documentation and  at bedside 2/2 patient condition, agitated, screaming in ED, minimally redirectable. Patient does not take any medications or insulin for some time. Patient's  states she has been agitated chronically for some time.     1.  Encephalopathy- toxic metabolic due to dehydration, hyperglycemia, can check UA but suspect worsening dementia contributing, check b12, rpr, tsh.  denies acute sx except one episode of diarrhea.  2. Hyperglycemia- no evidence of DKA, resume basal dose lantus, resume diet once glucose<300, continue IVF for now, check A1C, consider endocrine consultation if labile or not responding to initial tx   3. Dementia with behavioral disturbance- recommended seroquel and haldol prn severe agitation/ imminent harm to self or others, will try Seroquel now and reassess. Avoid anticholinergics, benzodiazepines, limit lines/tubes/tethers/restraints, encourage family presence and frequent reorientation/reassurance by staff, encourage good sleep hygiene, adequate lighting. Would consult palliative care, SW, known to service from previous admissions.   4. Dehydration   5. CAD s/p PCI- asa and statin if patient amenable, no known recent stents will defer dapt as a result   6. Severe protein calorie malnutrition, failure to thrive     Plan of care discussed with patient ;  all questions and concerns were addressed.  Discussed with Patient's family,  at bedside

## 2019-11-13 NOTE — H&P ADULT - NSICDXPASTMEDICALHX_GEN_ALL_CORE_FT
PAST MEDICAL HISTORY:  Dementia     Depression     Diabetes     Dyslipidemia     History of hypertension

## 2019-11-13 NOTE — ED ADULT NURSE NOTE - OBJECTIVE STATEMENT
The patient presents with increase agitation.  As per the daughter, the patient has not been compliant with her insulin for a year due to her dementia.  The patient sometimes stays up for about 48 hrs and consequently, sleeps for about 48 hrs.  The patient is noted with increased thirst. The patient presents with increase agitation.  As per the daughter (Dora [phone]), the patient has not been compliant with her insulin for a year due to her dementia.  The patient sometimes stays up for about 48 hrs and consequently, sleeps for about 48 hrs.  The patient is noted with increased thirst.

## 2019-11-13 NOTE — H&P ADULT - ASSESSMENT
74F from home, lives with daughter and , PMHx of dementia, CAD s/p 3 stent, HTN, DM, legally blind was brought in by family due to severe agitation and high blood glucose. Admitted for worsening dementia and hyperglycemia.

## 2019-11-14 DIAGNOSIS — F01.51 VASCULAR DEMENTIA, UNSPECIFIED SEVERITY, WITH BEHAVIORAL DISTURBANCE: ICD-10-CM

## 2019-11-14 DIAGNOSIS — G47.24 CIRCADIAN RHYTHM SLEEP DISORDER, FREE RUNNING TYPE: ICD-10-CM

## 2019-11-14 DIAGNOSIS — G47.20 CIRCADIAN RHYTHM SLEEP DISORDER, UNSPECIFIED TYPE: ICD-10-CM

## 2019-11-14 DIAGNOSIS — Z71.89 OTHER SPECIFIED COUNSELING: ICD-10-CM

## 2019-11-14 DIAGNOSIS — F05 DELIRIUM DUE TO KNOWN PHYSIOLOGICAL CONDITION: ICD-10-CM

## 2019-11-14 LAB
ALBUMIN SERPL ELPH-MCNC: 2.6 G/DL — LOW (ref 3.5–5)
ALP SERPL-CCNC: 89 U/L — SIGNIFICANT CHANGE UP (ref 40–120)
ALT FLD-CCNC: 14 U/L DA — SIGNIFICANT CHANGE UP (ref 10–60)
ANION GAP SERPL CALC-SCNC: 6 MMOL/L — SIGNIFICANT CHANGE UP (ref 5–17)
APPEARANCE UR: CLEAR — SIGNIFICANT CHANGE UP
AST SERPL-CCNC: 13 U/L — SIGNIFICANT CHANGE UP (ref 10–40)
BACTERIA # UR AUTO: ABNORMAL /HPF
BASOPHILS # BLD AUTO: 0.05 K/UL — SIGNIFICANT CHANGE UP (ref 0–0.2)
BASOPHILS NFR BLD AUTO: 1 % — SIGNIFICANT CHANGE UP (ref 0–2)
BILIRUB SERPL-MCNC: 0.3 MG/DL — SIGNIFICANT CHANGE UP (ref 0.2–1.2)
BILIRUB UR-MCNC: NEGATIVE — SIGNIFICANT CHANGE UP
BUN SERPL-MCNC: 10 MG/DL — SIGNIFICANT CHANGE UP (ref 7–18)
CALCIUM SERPL-MCNC: 8.5 MG/DL — SIGNIFICANT CHANGE UP (ref 8.4–10.5)
CHLORIDE SERPL-SCNC: 107 MMOL/L — SIGNIFICANT CHANGE UP (ref 96–108)
CHOLEST SERPL-MCNC: 194 MG/DL — SIGNIFICANT CHANGE UP (ref 10–199)
CO2 SERPL-SCNC: 26 MMOL/L — SIGNIFICANT CHANGE UP (ref 22–31)
COLOR SPEC: YELLOW — SIGNIFICANT CHANGE UP
COMMENT - URINE: SIGNIFICANT CHANGE UP
CREAT SERPL-MCNC: 0.76 MG/DL — SIGNIFICANT CHANGE UP (ref 0.5–1.3)
DIFF PNL FLD: NEGATIVE — SIGNIFICANT CHANGE UP
EOSINOPHIL # BLD AUTO: 0.11 K/UL — SIGNIFICANT CHANGE UP (ref 0–0.5)
EOSINOPHIL NFR BLD AUTO: 2.2 % — SIGNIFICANT CHANGE UP (ref 0–6)
EPI CELLS # UR: SIGNIFICANT CHANGE UP /HPF
GLUCOSE BLDC GLUCOMTR-MCNC: 150 MG/DL — HIGH (ref 70–99)
GLUCOSE BLDC GLUCOMTR-MCNC: 153 MG/DL — HIGH (ref 70–99)
GLUCOSE BLDC GLUCOMTR-MCNC: 186 MG/DL — HIGH (ref 70–99)
GLUCOSE BLDC GLUCOMTR-MCNC: 190 MG/DL — HIGH (ref 70–99)
GLUCOSE BLDC GLUCOMTR-MCNC: 222 MG/DL — HIGH (ref 70–99)
GLUCOSE SERPL-MCNC: 190 MG/DL — HIGH (ref 70–99)
GLUCOSE UR QL: 1000 MG/DL
HBA1C BLD-MCNC: >15.5 % — HIGH (ref 4–5.6)
HCT VFR BLD CALC: 37.7 % — SIGNIFICANT CHANGE UP (ref 34.5–45)
HDLC SERPL-MCNC: 52 MG/DL — SIGNIFICANT CHANGE UP
HGB BLD-MCNC: 12.3 G/DL — SIGNIFICANT CHANGE UP (ref 11.5–15.5)
IMM GRANULOCYTES NFR BLD AUTO: 0.4 % — SIGNIFICANT CHANGE UP (ref 0–1.5)
KETONES UR-MCNC: NEGATIVE — SIGNIFICANT CHANGE UP
LEUKOCYTE ESTERASE UR-ACNC: NEGATIVE — SIGNIFICANT CHANGE UP
LIPID PNL WITH DIRECT LDL SERPL: 106 MG/DL — SIGNIFICANT CHANGE UP
LYMPHOCYTES # BLD AUTO: 1.85 K/UL — SIGNIFICANT CHANGE UP (ref 1–3.3)
LYMPHOCYTES # BLD AUTO: 37.4 % — SIGNIFICANT CHANGE UP (ref 13–44)
MAGNESIUM SERPL-MCNC: 1.9 MG/DL — SIGNIFICANT CHANGE UP (ref 1.6–2.6)
MCHC RBC-ENTMCNC: 29.4 PG — SIGNIFICANT CHANGE UP (ref 27–34)
MCHC RBC-ENTMCNC: 32.6 GM/DL — SIGNIFICANT CHANGE UP (ref 32–36)
MCV RBC AUTO: 90 FL — SIGNIFICANT CHANGE UP (ref 80–100)
MONOCYTES # BLD AUTO: 0.32 K/UL — SIGNIFICANT CHANGE UP (ref 0–0.9)
MONOCYTES NFR BLD AUTO: 6.5 % — SIGNIFICANT CHANGE UP (ref 2–14)
NEUTROPHILS # BLD AUTO: 2.6 K/UL — SIGNIFICANT CHANGE UP (ref 1.8–7.4)
NEUTROPHILS NFR BLD AUTO: 52.5 % — SIGNIFICANT CHANGE UP (ref 43–77)
NITRITE UR-MCNC: NEGATIVE — SIGNIFICANT CHANGE UP
NRBC # BLD: 0 /100 WBCS — SIGNIFICANT CHANGE UP (ref 0–0)
PH UR: 5 — SIGNIFICANT CHANGE UP (ref 5–8)
PHOSPHATE SERPL-MCNC: 2.5 MG/DL — SIGNIFICANT CHANGE UP (ref 2.5–4.5)
PLATELET # BLD AUTO: 277 K/UL — SIGNIFICANT CHANGE UP (ref 150–400)
POTASSIUM SERPL-MCNC: 4.3 MMOL/L — SIGNIFICANT CHANGE UP (ref 3.5–5.3)
POTASSIUM SERPL-SCNC: 4.3 MMOL/L — SIGNIFICANT CHANGE UP (ref 3.5–5.3)
PROT SERPL-MCNC: 5.6 G/DL — LOW (ref 6–8.3)
PROT UR-MCNC: NEGATIVE — SIGNIFICANT CHANGE UP
RBC # BLD: 4.19 M/UL — SIGNIFICANT CHANGE UP (ref 3.8–5.2)
RBC # FLD: 12.5 % — SIGNIFICANT CHANGE UP (ref 10.3–14.5)
RBC CASTS # UR COMP ASSIST: SIGNIFICANT CHANGE UP /HPF (ref 0–2)
SODIUM SERPL-SCNC: 139 MMOL/L — SIGNIFICANT CHANGE UP (ref 135–145)
SP GR SPEC: 1.01 — SIGNIFICANT CHANGE UP (ref 1.01–1.02)
TOTAL CHOLESTEROL/HDL RATIO MEASUREMENT: 3.7 RATIO — SIGNIFICANT CHANGE UP (ref 3.3–7.1)
TRIGL SERPL-MCNC: 182 MG/DL — HIGH (ref 10–149)
TSH SERPL-MCNC: 0.82 UU/ML — SIGNIFICANT CHANGE UP (ref 0.34–4.82)
UROBILINOGEN FLD QL: NEGATIVE — SIGNIFICANT CHANGE UP
VIT B12 SERPL-MCNC: 677 PG/ML — SIGNIFICANT CHANGE UP (ref 232–1245)
WBC # BLD: 4.95 K/UL — SIGNIFICANT CHANGE UP (ref 3.8–10.5)
WBC # FLD AUTO: 4.95 K/UL — SIGNIFICANT CHANGE UP (ref 3.8–10.5)
WBC UR QL: SIGNIFICANT CHANGE UP /HPF (ref 0–5)

## 2019-11-14 PROCEDURE — 99223 1ST HOSP IP/OBS HIGH 75: CPT

## 2019-11-14 PROCEDURE — 99232 SBSQ HOSP IP/OBS MODERATE 35: CPT | Mod: GC

## 2019-11-14 PROCEDURE — 99254 IP/OBS CNSLTJ NEW/EST MOD 60: CPT

## 2019-11-14 PROCEDURE — 99222 1ST HOSP IP/OBS MODERATE 55: CPT

## 2019-11-14 RX ORDER — INSULIN GLARGINE 100 [IU]/ML
10 INJECTION, SOLUTION SUBCUTANEOUS AT BEDTIME
Refills: 0 | Status: DISCONTINUED | OUTPATIENT
Start: 2019-11-14 | End: 2019-11-16

## 2019-11-14 RX ORDER — QUETIAPINE FUMARATE 200 MG/1
25 TABLET, FILM COATED ORAL AT BEDTIME
Refills: 0 | Status: DISCONTINUED | OUTPATIENT
Start: 2019-11-14 | End: 2019-11-18

## 2019-11-14 RX ORDER — QUETIAPINE FUMARATE 200 MG/1
25 TABLET, FILM COATED ORAL AT BEDTIME
Refills: 0 | Status: DISCONTINUED | OUTPATIENT
Start: 2019-11-14 | End: 2019-11-14

## 2019-11-14 RX ORDER — SODIUM CHLORIDE 9 MG/ML
1000 INJECTION INTRAMUSCULAR; INTRAVENOUS; SUBCUTANEOUS
Refills: 0 | Status: DISCONTINUED | OUTPATIENT
Start: 2019-11-14 | End: 2019-11-16

## 2019-11-14 RX ORDER — LANOLIN ALCOHOL/MO/W.PET/CERES
3 CREAM (GRAM) TOPICAL
Refills: 0 | Status: DISCONTINUED | OUTPATIENT
Start: 2019-11-14 | End: 2019-11-15

## 2019-11-14 RX ORDER — INSULIN GLARGINE 100 [IU]/ML
10 INJECTION, SOLUTION SUBCUTANEOUS EVERY MORNING
Refills: 0 | Status: DISCONTINUED | OUTPATIENT
Start: 2019-11-14 | End: 2019-11-14

## 2019-11-14 RX ORDER — BUPROPION HYDROCHLORIDE 150 MG/1
75 TABLET, EXTENDED RELEASE ORAL
Refills: 0 | Status: DISCONTINUED | OUTPATIENT
Start: 2019-11-14 | End: 2019-11-16

## 2019-11-14 RX ORDER — LISINOPRIL 2.5 MG/1
5 TABLET ORAL DAILY
Refills: 0 | Status: DISCONTINUED | OUTPATIENT
Start: 2019-11-14 | End: 2019-11-18

## 2019-11-14 RX ORDER — INSULIN LISPRO 100/ML
4 VIAL (ML) SUBCUTANEOUS
Refills: 0 | Status: DISCONTINUED | OUTPATIENT
Start: 2019-11-14 | End: 2019-11-17

## 2019-11-14 RX ADMIN — Medication 4 UNIT(S): at 17:30

## 2019-11-14 RX ADMIN — SODIUM CHLORIDE 75 MILLILITER(S): 9 INJECTION INTRAMUSCULAR; INTRAVENOUS; SUBCUTANEOUS at 06:51

## 2019-11-14 RX ADMIN — BUPROPION HYDROCHLORIDE 75 MILLIGRAM(S): 150 TABLET, EXTENDED RELEASE ORAL at 14:59

## 2019-11-14 RX ADMIN — SODIUM CHLORIDE 75 MILLILITER(S): 9 INJECTION INTRAMUSCULAR; INTRAVENOUS; SUBCUTANEOUS at 16:07

## 2019-11-14 RX ADMIN — INSULIN GLARGINE 10 UNIT(S): 100 INJECTION, SOLUTION SUBCUTANEOUS at 22:55

## 2019-11-14 RX ADMIN — Medication 4: at 11:52

## 2019-11-14 RX ADMIN — MEMANTINE HYDROCHLORIDE 5 MILLIGRAM(S): 10 TABLET ORAL at 06:51

## 2019-11-14 RX ADMIN — Medication 2: at 17:30

## 2019-11-14 RX ADMIN — Medication 2: at 08:41

## 2019-11-14 RX ADMIN — LISINOPRIL 5 MILLIGRAM(S): 2.5 TABLET ORAL at 07:16

## 2019-11-14 RX ADMIN — HEPARIN SODIUM 5000 UNIT(S): 5000 INJECTION INTRAVENOUS; SUBCUTANEOUS at 06:50

## 2019-11-14 RX ADMIN — HEPARIN SODIUM 5000 UNIT(S): 5000 INJECTION INTRAVENOUS; SUBCUTANEOUS at 17:31

## 2019-11-14 RX ADMIN — Medication 81 MILLIGRAM(S): at 11:52

## 2019-11-14 NOTE — DIETITIAN INITIAL EVALUATION ADULT. - OTHER INFO
Patient from home lives with daughter. Visited pt. alert but refusing to participate with RD's interview, per PCA pt. only wants to sleep, ate breakfast poorly & only requesting to consume bread at mealtime, tried calling pt. daughter presently no response, also noted per chart pt. non-complaint with insulin & "diabetic" diet >2yrs. At last admission pt. dx. with PCM by RD on 7/18/17 &  Lbs noted in [place] on 5/3/17 noted, current wt. 90 Lbs & was able to conduct nutrition focus physical exam & found signs of malnutrition, see below fields, encourage po intake with feeding assistance, skin intact, d/w RN. Patient from home lives with daughter. Visited pt. alert but refusing to participate with RD's interview, per PCA pt. only wants to sleep, ate breakfast poorly & only requesting to consume bread at mealtime, tried calling pt. daughter presently no response, also noted per chart pt. non-complaint with insulin & "diabetic" diet >2yrs. At last admission pt. dx. with PCM by RD on 7/18/17 &  Lbs noted in [place] on 5/3/17 noted, current wt. 90 Lbs & was able to conduct nutrition focus physical exam & found signs of malnutrition, see below fields, encourage po intake with feeding assistance, skin intact, seen by Psych, Endo consulted & followed by Palliative Care team, d/w RN.

## 2019-11-14 NOTE — DIETITIAN INITIAL EVALUATION ADULT. - PHYSICAL APPEARANCE
debilitated/emaciated/other (specify)/BMI 18/contracted Nutrition focused physical exam conducted:  Subcutaneous fat loss: [Severe] Orbital fat pads region, [ ]Buccal fat region, [Moderate] Triceps region,  [ ]Ribs region.  Muscle wasting: [Severe] Temples region, [Moderate ]Clavicle region, [ ]Shoulder region, [Severe] Scapula region, [ ]Interosseous region,  [ ]thigh region, [Severe] Calf region

## 2019-11-14 NOTE — BEHAVIORAL HEALTH ASSESSMENT NOTE - NSBHCONSULTRECOMMENDOTHER_PSY_A_CORE FT
1. Recommend enhanced supervision for safety i/s/o dementia, delirium and vision loss  2. Recommend starting Wellbutrin 75 mg qam standing to improve daytime alertness  3. Recommend starting melatonin 3 mg q7pm standing to help with circadian rhythm disorder  --Please give at EXACT SAME TIME every night to maximize potential efficacy  4. For moderate acute agitation, recommend Seroquel 25 mg q24h PRN  --Recommend using only when agitation is at least moderate, given potential to interfere with efficacy of melatonin  5. Psychiatry will continue to follow  6. Request CM/SW assistance in identifying suitable DC plan for pt  7. Case d/w Louis Nazario and Rosita of primary team    Ada Dubois MD  Director, Consultation-Liaison Psychiatry Service  [id number] 1. Recommend enhanced supervision for safety i/s/o dementia, delirium and vision loss  2. Recommend starting Wellbutrin 75 mg qam standing to improve daytime alertness  3. Recommend starting melatonin 3 mg q7pm standing to help with circadian rhythm disorder  --Please give at EXACT SAME TIME every night to maximize potential efficacy  4. C/w home memantine 5 mg q12h, but do NOT recommend restarting home doxepin 3 mg qhs which was likely being used for sleep  5. For moderate acute agitation, recommend Seroquel 25 mg q24h PRN  --Recommend using only when agitation is at least moderate, given potential to interfere with efficacy of melatonin  6. Psychiatry will continue to follow  7. Request CM/SW assistance in identifying suitable DC plan for pt  8. Case d/w Louis Nazario and Rosita of primary team    Ada Dubois MD  Director, Consultation-Liaison Psychiatry Service  [id number]

## 2019-11-14 NOTE — DIETITIAN INITIAL EVALUATION ADULT. - PERTINENT LABORATORY DATA
11-14 Na139 mmol/L Glu 190 mg/dL<H> K+ 4.3 mmol/L Cr  0.76 mg/dL BUN 10 mg/dL 11-14 Phos 2.5 mg/dL 11-14 Alb 2.6 g/dL<L> 11-13 [id number] >15.5 %<H> 11-14 Chol 194 mg/dL  mg/dL HDL 52 mg/dL Trig 182 mg/dL<H>

## 2019-11-14 NOTE — DIETITIAN INITIAL EVALUATION ADULT. - FACTORS AFF FOOD INTAKE
change in mental status/difficulty with food procurement/preparation/persistent lack of appetite/other (specify)/weakness, hyperglycemia, dementia, HTN, depression, uncontrolled diabetes, CAD

## 2019-11-14 NOTE — BEHAVIORAL HEALTH ASSESSMENT NOTE - SUMMARY
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 Psych consult was requested on 11/13 for assistance in managing agitation. Pt is unable to participate in meaningful interview due to extreme somnolence, but per collateral obtained from daughter as well as recent chart note from [facility], pt has had severe circadian rhythm disturbance for a prolonged period of time, with vision loss 2/2 diabetes as well as vascular dementia as precipitating factors. Pt appears likely to benefit from efforts to re-establish a more normal circadian rhythm with the help of medications (melatonin 3 mg q7pm, Wellbutrin 75 mg qam) as well as consistent daily routines. Use of potentially sedating medications such as Seroquel should be avoided as much as possible in order to give pt opportunity to re-establish healthy sleep patterns. After DC, pt might benefit from attending a structured daytime program (eg adult day care) where she can be supervised and receive her medications, but if it proves impossible to re-establish normal routines, pt may ultimately require placement in a supervised dementia unit for safety. 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 Psych consult was requested on 11/13 for assistance in managing agitation. Pt is unable to participate in meaningful interview due to extreme somnolence, but per collateral obtained from daughter as well as recent chart note from [facility], pt has had severe circadian rhythm disturbance for a prolonged period of time, with vision loss 2/2 diabetes as well as vascular dementia as likely precipitating factors. Pt appears likely to benefit from efforts to re-establish a more normal circadian rhythm with the help of medications (melatonin 3 mg q7pm, Wellbutrin 75 mg qam) as well as consistent daily routines. Use of potentially sedating medications such as Seroquel should be avoided as much as possible in order to give pt opportunity to re-establish healthy sleep patterns. After DC, pt might benefit from attending a structured daytime program (eg adult day care) where she can be supervised and receive her medications, but if it proves impossible to re-establish normal routines, pt may ultimately require placement in a supervised dementia unit for safety.

## 2019-11-14 NOTE — BEHAVIORAL HEALTH ASSESSMENT NOTE - NSBHCHARTREVIEWVS_PSY_A_CORE FT
Vital Signs Last 24 Hrs  T(C): 36.6 (14 Nov 2019 10:10), Max: 36.9 (13 Nov 2019 20:18)  T(F): 97.9 (14 Nov 2019 10:10), Max: 98.4 (13 Nov 2019 20:18)  HR: 88 (14 Nov 2019 10:10) (75 - 93)  BP: 111/67 (14 Nov 2019 10:10) (111/67 - 174/94)  BP(mean): --  RR: 16 (14 Nov 2019 10:10) (16 - 17)  SpO2: 98% (14 Nov 2019 10:10) (98% - 99%)

## 2019-11-14 NOTE — BEHAVIORAL HEALTH ASSESSMENT NOTE - NSBHCHARTREVIEWINVESTIGATE_PSY_A_CORE FT
< from: 12 Lead ECG (11.13.19 @ 05:41) >    QTC Calculation(Bezet) 465 ms    P Axis 70 degrees    R Axis 81 degrees    T Axis 76 degrees    Diagnosis Line *** Poor data quality, interpretation may be adversely affected  Normal sinus rhythm  Normal ECG    < end of copied text >

## 2019-11-14 NOTE — CONSULT NOTE ADULT - SUBJECTIVE AND OBJECTIVE BOX
74 year old female with PMH of dementia, CAD s/p 3 stent, HTN, type 2 DM, and legally blind was brought in by family due to severe agitation and high blood glucose. Endocrinology was consulted for management of uncontrolled type 2 DM, A1c >15.5%.     The patient is a poor historian and unable to provide history. As per chart review, the patient's daughter, Dora Graf, she reported worsening dementia and the patient had refused to take medications. Family was still giving some oral medications with juice. The patient was refusing to take insulin. On arrival, BG was elevated to 592. She received 8 units of regular insulin with improvement in BG but FS remains above goal >200. She has not received basal insulin today. She reports good appetite.     ALLERGIES  NKDA      PAST MEDICAL & SURGICAL HISTORY:  Dyslipidemia  History of hypertension  Dementia  Depression  Diabetes  No significant past surgical history      SOCIAL HISTORY  Alcohol: denies   Tobacco: denies   Illicit substance use: denies       FAMILY HISTORY:  unknown- patient unable to provide    MEDICATIONS  (STANDING):  aspirin  chewable 81 milliGRAM(s) Oral daily  atorvastatin 40 milliGRAM(s) Oral at bedtime  buPROPion XL . 75 milliGRAM(s) Oral <User Schedule>  heparin  Injectable 5000 Unit(s) SubCutaneous every 12 hours  insulin glargine Injectable (LANTUS) 10 Unit(s) SubCutaneous at bedtime  insulin lispro (HumaLOG) corrective regimen sliding scale   SubCutaneous three times a day before meals  insulin lispro (HumaLOG) corrective regimen sliding scale   SubCutaneous at bedtime  insulin lispro Injectable (HumaLOG) 4 Unit(s) SubCutaneous three times a day before meals  lisinopril 5 milliGRAM(s) Oral daily  melatonin 3 milliGRAM(s) Oral <User Schedule>  memantine 5 milliGRAM(s) Oral two times a day  sodium chloride 0.9%. 1000 milliLiter(s) (75 mL/Hr) IV Continuous <Continuous>  sodium chloride 0.9%. 1000 milliLiter(s) (75 mL/Hr) IV Continuous <Continuous>    MEDICATIONS  (PRN):  acetaminophen   Tablet .. 650 milliGRAM(s) Oral every 6 hours PRN Moderate Pain (4 - 6)  QUEtiapine 25 milliGRAM(s) Oral at bedtime PRN severe agitation      REVIEW OF SYSTEMS:  CONSTITUTIONAL:  fatigue, No fever or weight loss  EYES: No eye pain, visual disturbances, or discharge  ENMT:  No difficulty hearing, tinnitus, vertigo; No sinus or throat pain  NECK: No pain or stiffness  RESPIRATORY: No cough, wheezing, chills or hemoptysis; No shortness of breath  CARDIOVASCULAR: No chest pain, palpitations, dizziness, or leg swelling  GASTROINTESTINAL: No abdominal or epigastric pain. No nausea, vomiting, or hematemesis; No diarrhea or constipation. No melena or hematochezia.  GENITOURINARY: No dysuria, frequency, hematuria, or incontinence  NEUROLOGICAL: memory loss, No headaches, loss of strength, numbness, or tremors  SKIN: No itching, burning, rashes, or lesions   LYMPH NODES: No enlarged glands  ENDOCRINE: No heat or cold intolerance; No hair loss  MUSCULOSKELETAL: No joint pain or swelling; No muscle, back, or extremity pain  PSYCHIATRIC: No depression, anxiety, mood swings, or difficulty sleeping  HEME/LYMPH: No easy bruising, or bleeding gums  ALLERGY AND IMMUNOLOGIC: No hives or eczema      PHYSICAL EXAM:    VITAL SIGNS  T(C): 36.4 (19 @ 14:14), Max: 36.9 (19 @ 20:18)  HR: 81 (19 @ 14:14) (75 - 93)  BP: 115/62 (19 @ 14:14) (111/67 - 174/94)  RR: 16 (19 @ 14:14) (16 - 17)  SpO2: 97% (19 @ 14:14) (97% - 99%)    GENERAL: NAD, well-groomed, well-developed  HEAD:  Atraumatic, Normocephalic  EYES: EOMI, PERRLA, conjunctiva and sclera clear  ENMT: No tonsillar erythema, exudates, or enlargement; Moist mucous membranes, No lesions  NECK: Supple, No JVD, Normal thyroid  NERVOUS SYSTEM:  Alert & Oriented X1-2, Good concentration; Motor Strength 5/5 B/L upper and lower extremities;   CHEST/LUNG: Clear to ausculation bilaterally; No rales, rhonchi, wheezing, or rubs  HEART: Regular rate and rhythm; No murmurs, rubs, or gallops  ABDOMEN: Soft, Nontender, Nondistended; Bowel sounds present  EXTREMITIES:  2+ Peripheral Pulses, No clubbing, cyanosis, or edema  LYMPH: No lymphadenopathy noted  SKIN: No rashes or lesions      LABS:                        12.3   4.95  )-----------( 277      ( 2019 07:07 )             37.7     -    139  |  107  |  10  ----------------------------<  190<H>  4.3   |  26  |  0.76    Ca    8.5      2019 07:07  Phos  2.5     -  Mg     1.9         TPro  5.6<L>  /  Alb  2.6<L>  /  TBili  0.3  /  DBili  x   /  AST  13  /  ALT  14  /  AlkPhos  89  11-    PT/INR - ( 2019 05:07 )   PT: 9.3 sec;   INR: 0.84 ratio         PTT - ( 2019 05:07 )  PTT:27.6 sec  Urinalysis Basic - ( 2019 14:52 )    Color: Yellow / Appearance: Clear / S.010 / pH: x  Gluc: x / Ketone: Negative  / Bili: Negative / Urobili: Negative   Blood: x / Protein: 15 / Nitrite: Negative   Leuk Esterase: Negative / RBC: 2-5 /HPF / WBC 0-2 /HPF   Sq Epi: x / Non Sq Epi: Occasional /HPF / Bacteria: Trace /HPF      CAPILLARY BLOOD GLUCOSE      POCT Blood Glucose.: 222 mg/dL (2019 11:25)  POCT Blood Glucose.: 186 mg/dL (2019 08:16)  POCT Blood Glucose.: 138 mg/dL (2019 21:12)  POCT Blood Glucose.: 214 mg/dL (2019 17:48)  POCT Blood Glucose.: 168 mg/dL (2019 16:29)        Urinalysis Basic - ( 2019 14:52 )    Color: Yellow / Appearance: Clear / S.010 / pH: x  Gluc: x / Ketone: Negative  / Bili: Negative / Urobili: Negative   Blood: x / Protein: 15 / Nitrite: Negative   Leuk Esterase: Negative / RBC: 2-5 /HPF / WBC 0-2 /HPF   Sq Epi: x / Non Sq Epi: Occasional /HPF / Bacteria: Trace /HPF    A1c >15.5%

## 2019-11-14 NOTE — PROGRESS NOTE ADULT - PROBLEM SELECTOR PLAN 6
IMPROVE VTE Individual Risk Assessment  RISK                                                                Points  [  ] Previous VTE                                                  3  [  ] Thrombophilia                                               2  [  ] Lower limb paralysis                                      2        (unable to hold up >15 seconds)    [  ] Current Cancer                                              2         (within 6 months)  [x  ] Immobilization > 24 hrs                                1  [  ] ICU/CCU stay > 24 hours                              1  [x  ] Age > 60                                                      1  IMPROVE VTE Score _________2,   heparin for DVT proph Spoke with patient's daughter Dora orellana. She states that she is the health care proxy. Patient has two other childern a daughter and a son but [facility] is the main HCP.  As per daughter patient has expressed her wishes to be DNR/DNI no feeding tubes.  YASMIN filled and signed.

## 2019-11-14 NOTE — CONSULT NOTE ADULT - ASSESSMENT
74 year old female with PMH of dementia, CAD s/p 3 stent, HTN, type 2 DM, and legally blind was brought in by family due to severe agitation and high blood glucose. Endocrinology was consulted for management of uncontrolled type 2 DM, A1c >15.5%.     1. Uncontrolled Type 2 DM, A1c >15.5%  -secondary to medication noncompliance (worsening dementia)  -recommend to start lantus 10 units at bedtime  -recommend to start Humalog 4 units TID with meals  -recommend to continue mealtime rapid acting insulin as below  BG 70-100mg/dL 0 units  -150 mg/dL 0 units  -200 mg/dL 1 units  -250 mg/dL 2 units  -300 mg/dL 3 units  -350 mg/dL 4 units  -400 mg/dL 5 units  BG > 400mg/dL 6 units  -FS AC HS  -ensure consistent carbohydrate diet   -will monitor FS and adjust accordingly   --if patient is NPO for any reason please change FS and sliding scale to NPO lispro scale     2. HTN  -BP at goal  -continue lisinopril    3. Worsening Dementia  -patient with worsening dementia, refusing medications  -continue seroquel and memantine  -psych follow up     Plan discussed with primary team  Thank you for allowing me to participate in the care of this patient  Please  call  w/ any questions or concerns [phone] 74 year old female with PMH of dementia, CAD s/p 3 stent, HTN, type 2 DM, and legally blind was brought in by family due to severe agitation and high blood glucose. Endocrinology was consulted for management of uncontrolled type 2 DM, A1c >15.5%.     1. Uncontrolled Type 2 DM, A1c >15.5%, with hyperglycemia  -secondary to medication noncompliance (worsening dementia)  -recommend to start lantus 10 units at bedtime  -recommend to start Humalog 4 units TID with meals  -recommend to continue mealtime rapid acting insulin as below  BG 70-100mg/dL 0 units  -150 mg/dL 0 units  -200 mg/dL 1 units  -250 mg/dL 2 units  -300 mg/dL 3 units  -350 mg/dL 4 units  -400 mg/dL 5 units  BG > 400mg/dL 6 units  -FS AC HS  -ensure consistent carbohydrate diet   -will monitor FS and adjust accordingly   --if patient is NPO for any reason please change FS and sliding scale to NPO lispro scale     2. HTN  -BP at goal  -continue lisinopril    3. Worsening Dementia  -patient with worsening dementia, refusing medications  -continue seroquel and memantine  -psych follow up     Plan discussed with primary team  Thank you for allowing me to participate in the care of this patient  Please  call  w/ any questions or concerns [phone]

## 2019-11-14 NOTE — BEHAVIORAL HEALTH ASSESSMENT NOTE - OTHER
Sleep disturbance No reported h/o violence Bed-bound "I want to rest" Difficult to assess due to somnolence

## 2019-11-14 NOTE — PROGRESS NOTE ADULT - PROBLEM/PLAN-7
Patient is here for weight check. In one month he has gained 1 lb. 13 oz. This is a gain of about 1 ounce a day which is good weight gain. Patient is still well under the 3rd percentile but is growing along the curve now. I advised aunt to give 22-calorie formula. She says she tried mixing it as 22-calorie formula for a few days but the baby's urine was really yellow so she went back to 20-calorie formula. He is taking 5-6 ounces of similac sensitive every 2-3 hours. Baby is having multiple wet diapers a day. He is also stooling daily.   DISPLAY PLAN FREE TEXT

## 2019-11-14 NOTE — BEHAVIORAL HEALTH ASSESSMENT NOTE - NSBHCHARTREVIEWLAB_PSY_A_CORE FT
11-14    139  |  107  |  10  ----------------------------<  190<H>  4.3   |  26  |  0.76    Ca    8.5      14 Nov 2019 07:07  Phos  2.5     11-14  Mg     1.9     11-14    TPro  5.6<L>  /  Alb  2.6<L>  /  TBili  0.3  /  DBili  x   /  AST  13  /  ALT  14  /  AlkPhos  89  11-14    Complete Blood Count + Automated Diff (11.14.19 @ 07:07)    WBC Count: 4.95 K/uL    RBC Count: 4.19 M/uL    Hemoglobin: 12.3 g/dL    Hematocrit: 37.7 %    Mean Cell Volume: 90.0 fl    Mean Cell Hemoglobin: 29.4 pg    Mean Cell Hemoglobin Conc: 32.6 gm/dL    Red Cell Distrib Width: 12.5 %    Platelet Count - Automated: 277 K/uL    Auto Neutrophil #: 2.60 K/uL    Auto Lymphocyte #: 1.85 K/uL    Auto Monocyte #: 0.32 K/uL    Auto Eosinophil #: 0.11 K/uL    Auto Basophil #: 0.05 K/uL    Auto Neutrophil %: 52.5: Differential percentages must be correlated with absolute numbers for  clinical significance. %    Auto Lymphocyte %: 37.4 %    Auto Monocyte %: 6.5 %    Auto Eosinophil %: 2.2 %    Auto Basophil %: 1.0 %    Auto Immature Granulocyte %: 0.4 %    Nucleated RBC: 0 /100 WBCs

## 2019-11-14 NOTE — DIETITIAN INITIAL EVALUATION ADULT. - PROBLEM SELECTOR PLAN 5
worsening dementia with agitation  on memantine and seroquel at home  c/w same meds  Psych. Dr. Caldera consulted  Palliative consulted to help in goals of care and dispo

## 2019-11-14 NOTE — BEHAVIORAL HEALTH ASSESSMENT NOTE - RISK ASSESSMENT
Low Acute Suicide Risk Risk factors: Older age, cognitive impairment. Protective factors: Stable domicile with supportive family, absent h/o SA/SI/self-injury. Risk level appears low at the time of assessment.

## 2019-11-14 NOTE — BEHAVIORAL HEALTH ASSESSMENT NOTE - HPI (INCLUDE ILLNESS QUALITY, SEVERITY, DURATION, TIMING, CONTEXT, MODIFYING FACTORS, ASSOCIATED SIGNS AND SYMPTOMS)
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 Psych consult was requested on 11/13 for assistance in managing agitation. Initial attempt was made to interview pt on the afternoon of 11/13, but she was very somnolent and totally unarousable s/p PRN dose of Seroquel 50 mg which had been given around 12 noon. Due to pt's inability to participate, collateral was obtained by phone from daughter Dora, who lives with pt. Per daughter, since she developed dementia 4 yrs ago pt has had profoundly disturbed circadian rhythms, and for the past 8-9 months she has had predictable cycles of not sleeping for up to 3 days at a time followed by "crashes" where she sleeps continuously for 2 or more days. Daughter reports that when pt is in one of her sleepless periods, she stays up around the clock leaving the lights on, watching TV and demanding food at all hours, screaming if she does not get it. Daughter says pt refuses to follow recommended diabetic diet and her blood sugars get massively elevated, after which she gets even more combative and disoriented. When pt falls asleep, she does not get up for days at a time and does not attend to ADLs such as eating, washing and toileting herself. Daughter states that family is having a "terrible time" managing pt's behavior and feels pt needs more HHA hours.     Second attempt was made to interview pt today. Pt was found sleeping in bed, minimally arousable to voice and light touch. MT in all extremities was WNL. Pt stated several times, "I don't want to talk, I want to rest," and did not respond to writer's repeated encouragement to sit up and engage with interview. When asked where she is, pt responded, "I don't know," then "En la cama" ["in the bed" in Kiswahili]. When asked if she would like a , pt said no and went back to sleep. 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 Psych consult was requested on 11/13 for assistance in managing agitation. Initial attempt was made to interview pt on the afternoon of 11/13, but she was very somnolent and totally unarousable s/p PRN dose of Seroquel 50 mg which had been given around 12 noon. Due to pt's inability to participate, collateral was obtained by phone from daughter Dora, who lives with pt. Per daughter, since she developed dementia 4 yrs ago pt has had profoundly disturbed circadian rhythms, and for the past 8-9 months she has had predictable cycles of not sleeping for up to 3 days at a time followed by "crashes" where she sleeps continuously for 2 or more days. Daughter reports that when pt is in one of her sleepless periods, she stays up around the clock leaving the lights on, watching TV and demanding food at all hours, screaming if she does not get it. Daughter says pt refuses to follow recommended diabetic diet and her blood sugars get massively elevated, after which she gets even more combative and disoriented. When pt falls asleep, she does not get up for days at a time and does not attend to ADLs such as eating, washing and toileting herself. Daughter states that family is having a "terrible time" managing pt's behavior and feels pt needs more HHA hours.     Second attempt was made to interview pt today. Pt was found sleeping in bed, minimally arousable to voice and light touch. MT in all extremities was WNL. Pt stated several times, "I don't want to talk, I want to rest," and did not respond to writer's repeated encouragement to sit up and engage with interview. When asked where she is, pt responded, "I don't know," then "En la cama" ["in the bed" in Turkish]. When asked if she would like a , pt said no and went back to sleep.

## 2019-11-14 NOTE — PROGRESS NOTE ADULT - ATTENDING COMMENTS
Patient was examined and discussed with Dr. Becker and with Psychiatry.     Will attempt to regularize her sleep-wake cycle.   DM, poor control.   Endocrine consultation, appreciated.

## 2019-11-14 NOTE — BEHAVIORAL HEALTH ASSESSMENT NOTE - NSBHSOCIALHXDETAILSFT_PSY_A_CORE
with 2 daughters and one son. Little formal work history (mostly SAHM). Lives with  and daughter Shell in [place].

## 2019-11-14 NOTE — PROGRESS NOTE ADULT - PROBLEM SELECTOR PLAN 1
on admission she had elevated blood glucose >548  -She is not on any medications at home  -Probably non compliance with insulin at home  Now she is on Humalog sliding scale   FS <200 so far  -She is probably not eating  Hba1c is >15  Will start lantus 10 units in the morning    -Will get endocrinology consult Dr Ronquillo

## 2019-11-14 NOTE — PROGRESS NOTE ADULT - SUBJECTIVE AND OBJECTIVE BOX
PGY 3 Note     Patient is a 74y old  Female who presents with a chief complaint of High blood glucose and agitation (2019 07:27)      INTERVAL HPI/OVERNIGHT EVENTS: Patient seen and examined at bed side.  Patient was in deep sleep. She is woke up with verbal stimuli. mumbled in Bahamian.     MEDICATIONS  (STANDING):  aspirin  chewable 81 milliGRAM(s) Oral daily  atorvastatin 40 milliGRAM(s) Oral at bedtime  heparin  Injectable 5000 Unit(s) SubCutaneous every 12 hours  insulin lispro (HumaLOG) corrective regimen sliding scale   SubCutaneous three times a day before meals  insulin lispro (HumaLOG) corrective regimen sliding scale   SubCutaneous at bedtime  lisinopril 5 milliGRAM(s) Oral daily  memantine 5 milliGRAM(s) Oral two times a day  QUEtiapine 50 milliGRAM(s) Oral daily  sodium chloride 0.9%. 1000 milliLiter(s) (75 mL/Hr) IV Continuous <Continuous>    MEDICATIONS  (PRN):  acetaminophen   Tablet .. 650 milliGRAM(s) Oral every 6 hours PRN Moderate Pain (4 - 6)      __________________________________________________  REVIEW OF SYSTEMS:  limited as patient is in deep sleep.        Vital Signs Last 24 Hrs  T(C): 36.3 (2019 05:04), Max: 36.9 (2019 20:18)  T(F): 97.3 (2019 05:04), Max: 98.4 (2019 20:18)  HR: 93 (2019 06:23) (75 - 93)  BP: 174/94 (2019 06:23) (126/63 - 174/94)  BP(mean): --  RR: 17 (2019 05:04) (16 - 17)  SpO2: 99% (2019 05:04) (99% - 100%)    ________________________________________________  PHYSICAL EXAM:  GENERAL: NAD  HEENT: Normocephalic;  conjunctivae and sclerae clear; dry mucous membranes;   NECK : supple  CHEST/LUNG: Clear to auscultation bilaterally with good air entry   HEART: S1 S2  regular; no murmurs, gallops or rubs  ABDOMEN: Soft, Nontender, Nondistended; Bowel sounds present  EXTREMITIES: no cyanosis; no edema; no calf tenderness  SKIN: warm and dry; no rash  NERVOUS SYSTEM:  arousal to verbal stimuli     _________________________________________________  LABS:                        12.3   4.95  )-----------( 277      ( 2019 07:07 )             37.7         139  |  107  |  10  ----------------------------<  190<H>  4.3   |  26  |  0.76    Ca    8.5      2019 07:07  Phos  2.5       Mg     1.9         TPro  5.6<L>  /  Alb  2.6<L>  /  TBili  0.3  /  DBili  x   /  AST  13  /  ALT  14  /  AlkPhos  89      PT/INR - ( 2019 05:07 )   PT: 9.3 sec;   INR: 0.84 ratio         PTT - ( 2019 05:07 )  PTT:27.6 sec  Urinalysis Basic - ( 2019 14:52 )    Color: Yellow / Appearance: Clear / S.010 / pH: x  Gluc: x / Ketone: Negative  / Bili: Negative / Urobili: Negative   Blood: x / Protein: 15 / Nitrite: Negative   Leuk Esterase: Negative / RBC: 2-5 /HPF / WBC 0-2 /HPF   Sq Epi: x / Non Sq Epi: Occasional /HPF / Bacteria: Trace /HPF      CAPILLARY BLOOD GLUCOSE      POCT Blood Glucose.: 186 mg/dL (2019 08:16)  POCT Blood Glucose.: 138 mg/dL (2019 21:12)  POCT Blood Glucose.: 214 mg/dL (2019 17:48)  POCT Blood Glucose.: 168 mg/dL (2019 16:29)  POCT Blood Glucose.: 119 mg/dL (2019 12:45)  POCT Blood Glucose.: 197 mg/dL (2019 10:07)        RADIOLOGY & ADDITIONAL TESTS:    Imaging Personally Reviewed:  YES    Consultant(s) Notes Reviewed:   YES    Care Discussed with Consultants :     Plan of care was discussed with patient and /or primary care giver; all questions and concerns were addressed and care was aligned with patient's wishes. PGY 3 Note     Patient is a 74y old  Female who presents with a chief complaint of High blood glucose and agitation (2019 07:27)      INTERVAL HPI/OVERNIGHT EVENTS: Patient seen and examined at bed side.  Patient was in deep sleep. She is woke up with verbal stimuli. mumbled in [place].     MEDICATIONS  (STANDING):  aspirin  chewable 81 milliGRAM(s) Oral daily  atorvastatin 40 milliGRAM(s) Oral at bedtime  heparin  Injectable 5000 Unit(s) SubCutaneous every 12 hours  insulin lispro (HumaLOG) corrective regimen sliding scale   SubCutaneous three times a day before meals  insulin lispro (HumaLOG) corrective regimen sliding scale   SubCutaneous at bedtime  lisinopril 5 milliGRAM(s) Oral daily  memantine 5 milliGRAM(s) Oral two times a day  QUEtiapine 50 milliGRAM(s) Oral daily  sodium chloride 0.9%. 1000 milliLiter(s) (75 mL/Hr) IV Continuous <Continuous>    MEDICATIONS  (PRN):  acetaminophen   Tablet .. 650 milliGRAM(s) Oral every 6 hours PRN Moderate Pain (4 - 6)      __________________________________________________  REVIEW OF SYSTEMS:  limited as patient is in deep sleep.        Vital Signs Last 24 Hrs  T(C): 36.3 (2019 05:04), Max: 36.9 (2019 20:18)  T(F): 97.3 (2019 05:04), Max: 98.4 (2019 20:18)  HR: 93 (2019 06:23) (75 - 93)  BP: 174/94 (2019 06:23) (126/63 - 174/94)  BP(mean): --  RR: 17 (2019 05:04) (16 - 17)  SpO2: 99% (2019 05:04) (99% - 100%)    ________________________________________________  PHYSICAL EXAM:  GENERAL: confused, chronically-ill patient, who is arousable  HEENT: Normocephalic;  conjunctivae and sclerae clear; dry mucous membranes;   NECK : supple  CHEST/LUNG: Clear to auscultation bilaterally with good air entry   HEART: S1 S2  regular; no murmurs, gallops or rubs  ABDOMEN: Soft, Nontender, Nondistended; Bowel sounds present  EXTREMITIES: no cyanosis; no edema; no calf tenderness  SKIN: warm and dry; no rash  NERVOUS SYSTEM:  arousal to verbal stimuli     _________________________________________________  LABS:                        12.3   4.95  )-----------( 277      ( 2019 07:07 )             37.7         139  |  107  |  10  ----------------------------<  190<H>  4.3   |  26  |  0.76    Ca    8.5      2019 07:07  Phos  2.5       Mg     1.9         TPro  5.6<L>  /  Alb  2.6<L>  /  TBili  0.3  /  DBili  x   /  AST  13  /  ALT  14  /  AlkPhos  89      PT/INR - ( 2019 05:07 )   PT: 9.3 sec;   INR: 0.84 ratio         PTT - ( 2019 05:07 )  PTT:27.6 sec  Urinalysis Basic - ( 2019 14:52 )    Color: Yellow / Appearance: Clear / S.010 / pH: x  Gluc: x / Ketone: Negative  / Bili: Negative / Urobili: Negative   Blood: x / Protein: 15 / Nitrite: Negative   Leuk Esterase: Negative / RBC: 2-5 /HPF / WBC 0-2 /HPF   Sq Epi: x / Non Sq Epi: Occasional /HPF / Bacteria: Trace /HPF      CAPILLARY BLOOD GLUCOSE      POCT Blood Glucose.: 186 mg/dL (2019 08:16)  POCT Blood Glucose.: 138 mg/dL (2019 21:12)  POCT Blood Glucose.: 214 mg/dL (2019 17:48)  POCT Blood Glucose.: 168 mg/dL (2019 16:29)  POCT Blood Glucose.: 119 mg/dL (2019 12:45)  POCT Blood Glucose.: 197 mg/dL (2019 10:07)        RADIOLOGY & ADDITIONAL TESTS:    Imaging Personally Reviewed:  YES    Consultant(s) Notes Reviewed:   YES    Care Discussed with Consultants :     Plan of care was discussed with patient and /or primary care giver; all questions and concerns were addressed and care was aligned with patient's wishes.

## 2019-11-14 NOTE — CHART NOTE - NSCHARTNOTEFT_GEN_A_CORE
Upon Nutritional Assessment by the Registered Dietitian your patient was determined to meet criteria / has evidence of the following diagnosis/diagnoses:          [ ]  Mild Protein Calorie Malnutrition        [ ]  Moderate Protein Calorie Malnutrition        [X ] Severe Protein Calorie Malnutrition        [ ] Unspecified Protein Calorie Malnutrition        [ X] Underweight / BMI <19        [ ] Morbid Obesity / BMI > 40      Findings as based on:  •  Comprehensive nutrition assessment and consultation  •  Calorie counts (nutrient intake analysis)  •  Food acceptance and intake status from observations by staff  •  Follow up  •  Patient education  •  Intervention secondary to interdisciplinary rounds  •   concerns      Treatment:    The following diet has been recommended:      PROVIDER Section:     By signing this assessment you are acknowledging and agree with the diagnosis/diagnoses assigned by the Registered Dietitian    Comments:  Add Glucerna Shake 1can bid as medically feasible (440kcal, 20g protein) & MVI/minerals daily as medically feasible.

## 2019-11-14 NOTE — BEHAVIORAL HEALTH ASSESSMENT NOTE - OTHER PAST PSYCHIATRIC HISTORY (INCLUDE DETAILS REGARDING ONSET, COURSE OF ILLNESS, INPATIENT/OUTPATIENT TREATMENT)
PHx: Dementia dx around 2015, per daughter  IP: None reported  OP: No psychiatric provider, but has received Rx for dementia and sleep meds (memantine and doxepin) from [facility] Michelle PHx: Dementia dx around 2015, per daughter  IP: None reported  OP: No psychiatric provider, but has received Rx for dementia and sleep meds (memantine and doxepin) from [facility] provider making home visits  Rx: memantine 5 mg q12h, doxepin unknown dose PHx: Dementia dx around 2015, per daughter  IP: None reported  OP: No psychiatric provider, but has received Rx for dementia and sleep meds (memantine and doxepin) in past from [facility] providers (most recent ALBERTO Noyola on 10/10/2019)  Rx: Memantine 5 mg BID, Silenor 3 mg qhs [per chart note, supply has run out--no longer taking]    Rx: memantine 5 mg q12h, doxepin unknown dose PHx: Dementia dx around 2015, per daughter  IP: None reported  OP: No psychiatric provider, but has received Rx for dementia and sleep meds (memantine and doxepin) in past from [facility] providers (most recent ALBERTO Noyola on 10/10/2019)  Rx: Memantine 5 mg BID, Silenor 3 mg qhs [per chart note, supply has run out--no longer taking]

## 2019-11-14 NOTE — DIETITIAN INITIAL EVALUATION ADULT. - PERTINENT MEDS FT
MEDICATIONS  (STANDING):  aspirin  chewable 81 milliGRAM(s) Oral daily  atorvastatin 40 milliGRAM(s) Oral at bedtime  buPROPion XL . 75 milliGRAM(s) Oral <User Schedule>  heparin  Injectable 5000 Unit(s) SubCutaneous every 12 hours  insulin glargine Injectable (LANTUS) 10 Unit(s) SubCutaneous every morning  insulin lispro (HumaLOG) corrective regimen sliding scale   SubCutaneous three times a day before meals  insulin lispro (HumaLOG) corrective regimen sliding scale   SubCutaneous at bedtime  lisinopril 5 milliGRAM(s) Oral daily  memantine 5 milliGRAM(s) Oral two times a day  QUEtiapine 25 milliGRAM(s) Oral at bedtime  sodium chloride 0.9%. 1000 milliLiter(s) (75 mL/Hr) IV Continuous <Continuous>    MEDICATIONS  (PRN):  acetaminophen   Tablet .. 650 milliGRAM(s) Oral every 6 hours PRN Moderate Pain (4 - 6)

## 2019-11-14 NOTE — BEHAVIORAL HEALTH ASSESSMENT NOTE - NSBHCONSULTFOLLOWDETAILS_PSY_A_CORE FT
Does not need IP psych admission, but delirium and behavioral disturbance need to be managed prior to DC

## 2019-11-14 NOTE — BEHAVIORAL HEALTH ASSESSMENT NOTE - SUICIDE PROTECTIVE FACTORS
Supportive social network of family or friends/Identifies reasons for living/Has future plans/Responsibility to family and others/Cultural, spiritual and/or moral attitudes against suicide/Mormonism beliefs

## 2019-11-14 NOTE — PROGRESS NOTE ADULT - PROBLEM SELECTOR PLAN 5
worsening dementia with agitation  on memantine and seroquel at home  c/w same meds  Psych. Dr. Caldera consulted  - consult for placement. possible long term if family agrees  Palliative consulted to help in goals of care and dispo worsening dementia with agitation  on memantine   -Dec seroquel to 25mg at bed time   -Add wellbutrin 75mg in morning as per psychiatry recommendation  Psych. Dr. Caldera consult appreciated   -SW consult for placement. possible long term/increase hours if family agrees  Palliative consulted to help in goals of care and dispo

## 2019-11-15 DIAGNOSIS — R53.81 OTHER MALAISE: ICD-10-CM

## 2019-11-15 DIAGNOSIS — Z51.5 ENCOUNTER FOR PALLIATIVE CARE: ICD-10-CM

## 2019-11-15 DIAGNOSIS — E43 UNSPECIFIED SEVERE PROTEIN-CALORIE MALNUTRITION: ICD-10-CM

## 2019-11-15 LAB
GLUCOSE BLDC GLUCOMTR-MCNC: 126 MG/DL — HIGH (ref 70–99)
GLUCOSE BLDC GLUCOMTR-MCNC: 235 MG/DL — HIGH (ref 70–99)
GLUCOSE BLDC GLUCOMTR-MCNC: 369 MG/DL — HIGH (ref 70–99)
GLUCOSE BLDC GLUCOMTR-MCNC: 94 MG/DL — SIGNIFICANT CHANGE UP (ref 70–99)

## 2019-11-15 PROCEDURE — 99231 SBSQ HOSP IP/OBS SF/LOW 25: CPT

## 2019-11-15 PROCEDURE — 99233 SBSQ HOSP IP/OBS HIGH 50: CPT

## 2019-11-15 PROCEDURE — 99223 1ST HOSP IP/OBS HIGH 75: CPT

## 2019-11-15 PROCEDURE — 99232 SBSQ HOSP IP/OBS MODERATE 35: CPT | Mod: GC

## 2019-11-15 RX ORDER — LANOLIN ALCOHOL/MO/W.PET/CERES
3 CREAM (GRAM) TOPICAL
Refills: 0 | Status: DISCONTINUED | OUTPATIENT
Start: 2019-11-15 | End: 2019-11-18

## 2019-11-15 RX ORDER — LANOLIN ALCOHOL/MO/W.PET/CERES
3 CREAM (GRAM) TOPICAL
Refills: 0 | Status: DISCONTINUED | OUTPATIENT
Start: 2019-11-15 | End: 2019-11-15

## 2019-11-15 RX ADMIN — ATORVASTATIN CALCIUM 40 MILLIGRAM(S): 80 TABLET, FILM COATED ORAL at 21:23

## 2019-11-15 RX ADMIN — Medication 6: at 21:24

## 2019-11-15 RX ADMIN — HEPARIN SODIUM 5000 UNIT(S): 5000 INJECTION INTRAVENOUS; SUBCUTANEOUS at 06:39

## 2019-11-15 RX ADMIN — Medication 4 UNIT(S): at 12:21

## 2019-11-15 RX ADMIN — Medication 4: at 12:21

## 2019-11-15 RX ADMIN — INSULIN GLARGINE 10 UNIT(S): 100 INJECTION, SOLUTION SUBCUTANEOUS at 21:23

## 2019-11-15 RX ADMIN — HEPARIN SODIUM 5000 UNIT(S): 5000 INJECTION INTRAVENOUS; SUBCUTANEOUS at 18:33

## 2019-11-15 RX ADMIN — MEMANTINE HYDROCHLORIDE 5 MILLIGRAM(S): 10 TABLET ORAL at 18:33

## 2019-11-15 RX ADMIN — SODIUM CHLORIDE 75 MILLILITER(S): 9 INJECTION INTRAMUSCULAR; INTRAVENOUS; SUBCUTANEOUS at 06:39

## 2019-11-15 NOTE — CONSULT NOTE ADULT - PROBLEM SELECTOR RECOMMENDATION 2
on wellbutrin, melatonin per psych, patient spitting out meds. Daughter reports meds usually crushed into liquids or food at home

## 2019-11-15 NOTE — PROGRESS NOTE ADULT - ATTENDING COMMENTS
Patient was examined and discussed with Dr. Becker. Son is at the bedside.     She is somnolent, but arousable.  She resists being disturbed.     Vital Signs Last 24 Hrs  T(C): 36.7 (15 Nov 2019 14:24), Max: 36.8 (15 Nov 2019 05:19)  T(F): 98 (15 Nov 2019 14:24), Max: 98.2 (15 Nov 2019 05:19)  HR: 81 (15 Nov 2019 14:24) (66 - 81)  BP: 138/73 (15 Nov 2019 14:24) (110/45 - 154/75)  BP(mean): --  RR: 18 (15 Nov 2019 14:24) (16 - 18)  SpO2: 98% (15 Nov 2019 14:24) (96% - 98%)  Lungs, clear  Cor, RRR  Abdomen, soft  Neurological, non-focal                        12.3   4.95  )-----------( 277      ( 14 Nov 2019 07:07 )             37.7   11-14    139  |  107  |  10  ----------------------------<  190<H>  4.3   |  26  |  0.76    Ca    8.5      14 Nov 2019 07:07  Phos  2.5     11-14  Mg     1.9     11-14    TPro  5.6<L>  /  Alb  2.6<L>  /  TBili  0.3  /  DBili  x   /  AST  13  /  ALT  14  /  AlkPhos  89  11-14  Hemoglobin A1C, Whole Blood: >15.5: Method: Immunoassay    Vitamin B12, Serum: 677 pg/mL (11.14.19 @ 09:54)    IMP:  ASCVD, stable          Dementia with behavioral disturbance and altered sleep/wake cycle          poorly contributed DM has contributed to weight loss-protein calorie malnutrition  Plan: Attempt to give stimulant in AM and sedate in PM.           Control DM with basal/bolus insulin          Nutrition consultation, appreciated          Psychiatry f/u, appreciated.          RPR with next lab draw. Patient was examined and discussed with Dr. Becker. Son is at the bedside.     She is somnolent, but arousable.  She resists being disturbed.     Vital Signs Last 24 Hrs  T(C): 36.7 (15 Nov 2019 14:24), Max: 36.8 (15 Nov 2019 05:19)  T(F): 98 (15 Nov 2019 14:24), Max: 98.2 (15 Nov 2019 05:19)  HR: 81 (15 Nov 2019 14:24) (66 - 81)  BP: 138/73 (15 Nov 2019 14:24) (110/45 - 154/75)  BP(mean): --  RR: 18 (15 Nov 2019 14:24) (16 - 18)  SpO2: 98% (15 Nov 2019 14:24) (96% - 98%)  Lungs, clear  Cor, RRR  Abdomen, soft  Neurological, non-focal                        12.3   4.95  )-----------( 277      ( 14 Nov 2019 07:07 )             37.7   11-14    139  |  107  |  10  ----------------------------<  190<H>  4.3   |  26  |  0.76    Ca    8.5      14 Nov 2019 07:07  Phos  2.5     11-14  Mg     1.9     11-14    TPro  5.6<L>  /  Alb  2.6<L>  /  TBili  0.3  /  DBili  x   /  AST  13  /  ALT  14  /  AlkPhos  89  11-14  Hemoglobin A1C, Whole Blood: >15.5: Method: Immunoassay    Vitamin B12, Serum: 677 pg/mL (11.14.19 @ 09:54)    IMP:  ASCVD, stable          Dementia with behavioral disturbance and altered sleep/wake cycle          poorly controlled DM has contributed to weight loss-protein calorie malnutrition  Plan: Attempt to give stimulant in AM and sedate in PM.           Control DM with basal/bolus insulin          Nutrition consultation, appreciated          Psychiatry f/u, appreciated.          RPR with next lab draw.

## 2019-11-15 NOTE — PROGRESS NOTE ADULT - PROBLEM SELECTOR PLAN 1
Improving   on admission she had elevated blood glucose >548  -She is not on any medications at home  -Probably non compliance with insulin at home  -Added lantus 10 units and Humalog 4 units TID  -FS <200 so far  -She is probably not eating  -Hba1c is >15  -Will start lantus 10 units in the morning    -Will -endo consult Dr Ronquillo

## 2019-11-15 NOTE — CONSULT NOTE ADULT - PROBLEM SELECTOR RECOMMENDATION 4
daughter reports patient ambulatory w assist. Has HHA 9h/7d week. Assist w ADLs prn. Family looking to increase HHA hours.

## 2019-11-15 NOTE — PROGRESS NOTE ADULT - PROBLEM SELECTOR PLAN 4
-Will try to administer medicine today. if she doesn't take it, we have to speak with psychiatry for alternative options.   -Patient's sleep wake cycle needs to be restored  -  -seroquel was decreased to 25mg at bed time prn and added melatonin 7 pm and wellbutrin 75mg in morning as per psychiatry recommendation  However patient did not take any medication yesterday.  Psych. Dr. Caldera consult appreciated   - consult for placement. possible long term/increase hours if family agrees  Palliative consulted to help in goals of care and dispo. -Will try to administer medicine today. if she doesn't take it, we have to speak with psychiatry for alternative options.   -Patient's sleep wake cycle needs to be restored  -  -seroquel was decreased to 25mg at bed time prn and added melatonin 7 pm and wellbutrin 75mg in morning as per psychiatry recommendation  However patient did not take any medication yesterday.  Psych. Dr. Dubois' consult appreciated   - consult for placement. possible long term/increase hours if family agrees  Palliative consulted to help in goals of care and planning for post-hospital care.

## 2019-11-15 NOTE — PROGRESS NOTE ADULT - SUBJECTIVE AND OBJECTIVE BOX
PGY 3 Note     Patient is a 74y old  Female who presents with a chief complaint of High blood glucose and agitation (2019 15:23)      INTERVAL HPI/OVERNIGHT EVENTS: Patient seen and examined at bed side.  Patient is sleeping again but now woke up on my arrival. She said '' I need bread in Mongolian''. Patient is not taking her meds prescribed since yesterday including wellbutrin and melatonin. As per Nurse Cyndi patient spit it despite of putting in juice or apple sauce.     MEDICATIONS  (STANDING):  aspirin  chewable 81 milliGRAM(s) Oral daily  atorvastatin 40 milliGRAM(s) Oral at bedtime  buPROPion XL . 75 milliGRAM(s) Oral <User Schedule>  heparin  Injectable 5000 Unit(s) SubCutaneous every 12 hours  insulin glargine Injectable (LANTUS) 10 Unit(s) SubCutaneous at bedtime  insulin lispro (HumaLOG) corrective regimen sliding scale   SubCutaneous three times a day before meals  insulin lispro (HumaLOG) corrective regimen sliding scale   SubCutaneous at bedtime  insulin lispro Injectable (HumaLOG) 4 Unit(s) SubCutaneous three times a day before meals  lisinopril 5 milliGRAM(s) Oral daily  melatonin 3 milliGRAM(s) Oral <User Schedule>  memantine 5 milliGRAM(s) Oral two times a day  sodium chloride 0.9%. 1000 milliLiter(s) (75 mL/Hr) IV Continuous <Continuous>  sodium chloride 0.9%. 1000 milliLiter(s) (75 mL/Hr) IV Continuous <Continuous>    MEDICATIONS  (PRN):  acetaminophen   Tablet .. 650 milliGRAM(s) Oral every 6 hours PRN Moderate Pain (4 - 6)  QUEtiapine 25 milliGRAM(s) Oral at bedtime PRN severe agitation      __________________________________________________  REVIEW OF SYSTEMS: Limited and unreliable due to cognitive disability     Vital Signs Last 24 Hrs  T(C): 36.8 (15 Nov 2019 05:19), Max: 36.8 (15 Nov 2019 05:19)  T(F): 98.2 (15 Nov 2019 05:19), Max: 98.2 (15 Nov 2019 05:19)  HR: 66 (15 Nov 2019 05:19) (66 - 88)  BP: 110/45 (15 Nov 2019 05:19) (110/45 - 154/75)  BP(mean): --  RR: 17 (15 Nov 2019 05:19) (16 - 17)  SpO2: 96% (15 Nov 2019 05:19) (96% - 98%)    ________________________________________________  PHYSICAL EXAM:  GENERAL: NAD  HEENT: Normocephalic;  conjunctivae and sclerae clear; moist mucous membranes;   NECK : supple  CHEST/LUNG: Clear to auscultation bilaterally with good air entry   HEART: S1 S2  regular; no murmurs, gallops or rubs  ABDOMEN: Soft, Nontender, Nondistended; Bowel sounds present  EXTREMITIES: no cyanosis; no edema; no calf tenderness  SKIN: warm and dry; no rash  NERVOUS SYSTEM:  Sleeping. Arousable to verbal stimuli.     _________________________________________________  LABS:                        12.3   4.95  )-----------( 277      ( 2019 07:07 )             37.7         139  |  107  |  10  ----------------------------<  190<H>  4.3   |  26  |  0.76    Ca    8.5      2019 07:07  Phos  2.5       Mg     1.9         TPro  5.6<L>  /  Alb  2.6<L>  /  TBili  0.3  /  DBili  x   /  AST  13  /  ALT  14  /  AlkPhos  89  14      Urinalysis Basic - ( 2019 16:02 )    Color: Yellow / Appearance: Clear / S.015 / pH: x  Gluc: x / Ketone: Negative  / Bili: Negative / Urobili: Negative   Blood: x / Protein: Negative / Nitrite: Negative   Leuk Esterase: Negative / RBC: 0-2 /HPF / WBC 0-2 /HPF   Sq Epi: x / Non Sq Epi: Few /HPF / Bacteria: Few /HPF      CAPILLARY BLOOD GLUCOSE      POCT Blood Glucose.: 94 mg/dL (15 Nov 2019 07:52)  POCT Blood Glucose.: 153 mg/dL (2019 22:49)  POCT Blood Glucose.: 150 mg/dL (2019 21:24)  POCT Blood Glucose.: 190 mg/dL (2019 16:36)  POCT Blood Glucose.: 222 mg/dL (2019 11:25)        RADIOLOGY & ADDITIONAL TESTS:    Imaging Personally Reviewed:  YES    Consultant(s) Notes Reviewed:   YES    Care Discussed with Consultants :     Plan of care was discussed with patient and /or primary care giver; all questions and concerns were addressed and care was aligned with patient's wishes. PGY 3 Note     Patient is a 74y old  Female who presents with a chief complaint of High blood glucose and agitation (2019 15:23)  74F from home, lives with daughter and , PMHx of dementia, CAD s/p 3 stent, HTN, DM, legally blind was brought in by family due to severe agitation and high blood glucose.      INTERVAL HPI/OVERNIGHT EVENTS: Patient seen and examined at bed side.  Patient is sleeping again but now woke up on my arrival. She said '' I need bread in Persian''. Patient is not taking her meds prescribed since yesterday including wellbutrin and melatonin. As per Nurse Cyndi patient spit it despite of putting in juice or apple sauce.     MEDICATIONS  (STANDING):  aspirin  chewable 81 milliGRAM(s) Oral daily  atorvastatin 40 milliGRAM(s) Oral at bedtime  buPROPion XL . 75 milliGRAM(s) Oral <User Schedule>  heparin  Injectable 5000 Unit(s) SubCutaneous every 12 hours  insulin glargine Injectable (LANTUS) 10 Unit(s) SubCutaneous at bedtime  insulin lispro (HumaLOG) corrective regimen sliding scale   SubCutaneous three times a day before meals  insulin lispro (HumaLOG) corrective regimen sliding scale   SubCutaneous at bedtime  insulin lispro Injectable (HumaLOG) 4 Unit(s) SubCutaneous three times a day before meals  lisinopril 5 milliGRAM(s) Oral daily  melatonin 3 milliGRAM(s) Oral <User Schedule>  memantine 5 milliGRAM(s) Oral two times a day  sodium chloride 0.9%. 1000 milliLiter(s) (75 mL/Hr) IV Continuous <Continuous>  sodium chloride 0.9%. 1000 milliLiter(s) (75 mL/Hr) IV Continuous <Continuous>    MEDICATIONS  (PRN):  acetaminophen   Tablet .. 650 milliGRAM(s) Oral every 6 hours PRN Moderate Pain (4 - 6)  QUEtiapine 25 milliGRAM(s) Oral at bedtime PRN severe agitation      __________________________________________________  REVIEW OF SYSTEMS: Limited and unreliable due to cognitive disability     Vital Signs Last 24 Hrs  T(C): 36.8 (15 Nov 2019 05:19), Max: 36.8 (15 Nov 2019 05:19)  T(F): 98.2 (15 Nov 2019 05:19), Max: 98.2 (15 Nov 2019 05:19)  HR: 66 (15 Nov 2019 05:19) (66 - 88)  BP: 110/45 (15 Nov 2019 05:19) (110/45 - 154/75)  BP(mean): --  RR: 17 (15 Nov 2019 05:19) (16 - 17)  SpO2: 96% (15 Nov 2019 05:19) (96% - 98%)    ________________________________________________  PHYSICAL EXAM:  GENERAL: Somnolent, uncooperative woman, chronically-ill NAD  HEENT: Normocephalic;  conjunctivae and sclerae clear; moist mucous membranes;   NECK : supple  CHEST/LUNG: Clear to auscultation bilaterally with good air entry   HEART: S1 S2  regular; no murmurs, gallops or rubs  ABDOMEN: Soft, Nontender, Nondistended; Bowel sounds present  EXTREMITIES: no cyanosis; no edema; no calf tenderness  SKIN: warm and dry; no rash  NERVOUS SYSTEM:  Sleeping. Arousable to verbal stimuli.     _________________________________________________  LABS:                        12.3   4.95  )-----------( 277      ( 2019 07:07 )             37.7         139  |  107  |  10  ----------------------------<  190<H>  4.3   |  26  |  0.76    Ca    8.5      2019 07:07  Phos  2.5       Mg     1.9         TPro  5.6<L>  /  Alb  2.6<L>  /  TBili  0.3  /  DBili  x   /  AST  13  /  ALT  14  /  AlkPhos  89        Urinalysis Basic - ( 2019 16:02 )    Color: Yellow / Appearance: Clear / S.015 / pH: x  Gluc: x / Ketone: Negative  / Bili: Negative / Urobili: Negative   Blood: x / Protein: Negative / Nitrite: Negative   Leuk Esterase: Negative / RBC: 0-2 /HPF / WBC 0-2 /HPF   Sq Epi: x / Non Sq Epi: Few /HPF / Bacteria: Few /HPF      CAPILLARY BLOOD GLUCOSE      POCT Blood Glucose.: 94 mg/dL (15 Nov 2019 07:52)  POCT Blood Glucose.: 153 mg/dL (2019 22:49)  POCT Blood Glucose.: 150 mg/dL (2019 21:24)  POCT Blood Glucose.: 190 mg/dL (2019 16:36)  POCT Blood Glucose.: 222 mg/dL (2019 11:25)            Care Discussed with Consultants :   Psychiatry    Plan of care was discussed with patient and /or primary care giver; all questions and concerns were addressed and care was aligned with patient's wishes.  Son was at the bedside.

## 2019-11-15 NOTE — PROGRESS NOTE ADULT - PROBLEM/PLAN-2
DISPLAY PLAN FREE TEXT Consent: The risks of atrophy were reviewed with the patient. Medical Necessity Clause: This procedure was medically necessary because the lesions that were treated were: Detail Level: Detailed Concentration Of Solution Injected (Mg/Ml): 40.0 Kenalog Preparation: Kenalog Include Z78.9 (Other Specified Conditions Influencing Health Status) As An Associated Diagnosis?: No Size Of Lesion (Optional): 2 X Size Of Lesion In Cm (Optional): 0 Total Volume Injected (Ccs- Only Use Numbers And Decimals): 0.5

## 2019-11-15 NOTE — CONSULT NOTE ADULT - SUBJECTIVE AND OBJECTIVE BOX
HPI:  74F from home, lives with daughter and , PMHx of dementia, CAD s/p 3 stent, HTN, DM, legally blind was brought in by family due to severe agitation and high blood glucose. On my eval, pt. was AAO X2, states her daughter did not give her any food when she was hungry and send her to hospital. Spoke to daughter, Dora Graf over phone. She states that pt. had last stent placed 4 yrs ago and since then, she has dementia, which is getting worst, she is mostly agitated, does not sleep well whole night and does not let other people sleep too. She refuses to take any meds, however family crushes seroquel and memantine and gives it with juice. Her blood glucose has been always uncontrolled and she refuses to take insulin as prescribed. Denies any fever, N/V, urinary or bowel complaint, cough, pain anywhere. (2019 07:27)    Interval history: patient sleeping, spit out meds this AM per RN.     PAST MEDICAL & SURGICAL HISTORY:  Dyslipidemia  History of hypertension  Dementia  Depression  Diabetes  No significant past surgical history      SOCIAL HISTORY:  , has children  Admitted from:  home      Preferred Language: Brazilian    Surrogate/HCP/Guardian:     Dora Graf       Phone#:    FAMILY HISTORY:  No pertinent family history in first degree relatives    Baseline ADLs (prior to admission): ambulatory, confused    Allergies    No Known Allergies    Intolerances      Present Symptoms:      Review of Systems:   Unable to obtain due to poor mentation      MEDICATIONS  (STANDING):  aspirin  chewable 81 milliGRAM(s) Oral daily  atorvastatin 40 milliGRAM(s) Oral at bedtime  buPROPion XL . 75 milliGRAM(s) Oral <User Schedule>  heparin  Injectable 5000 Unit(s) SubCutaneous every 12 hours  insulin glargine Injectable (LANTUS) 10 Unit(s) SubCutaneous at bedtime  insulin lispro (HumaLOG) corrective regimen sliding scale   SubCutaneous three times a day before meals  insulin lispro (HumaLOG) corrective regimen sliding scale   SubCutaneous at bedtime  insulin lispro Injectable (HumaLOG) 4 Unit(s) SubCutaneous three times a day before meals  lisinopril 5 milliGRAM(s) Oral daily  melatonin 3 milliGRAM(s) Oral <User Schedule>  memantine 5 milliGRAM(s) Oral two times a day  sodium chloride 0.9%. 1000 milliLiter(s) (75 mL/Hr) IV Continuous <Continuous>  sodium chloride 0.9%. 1000 milliLiter(s) (75 mL/Hr) IV Continuous <Continuous>    MEDICATIONS  (PRN):  acetaminophen   Tablet .. 650 milliGRAM(s) Oral every 6 hours PRN Moderate Pain (4 - 6)  QUEtiapine 25 milliGRAM(s) Oral at bedtime PRN severe agitation      PHYSICAL EXAM:    Vital Signs Last 24 Hrs  T(C): 36.7 (15 Nov 2019 14:24), Max: 36.8 (15 Nov 2019 05:19)  T(F): 98 (15 Nov 2019 14:24), Max: 98.2 (15 Nov 2019 05:19)  HR: 81 (15 Nov 2019 14:24) (66 - 81)  BP: 138/73 (15 Nov 2019 14:24) (110/45 - 154/75)  BP(mean): --  RR: 18 (15 Nov 2019 14:24) (16 - 18)  SpO2: 98% (15 Nov 2019 14:24) (96% - 98%)       Karnofsky Performance Score/Palliative Performance Status Version2:   50  %     GENERAL: sleepy, frail, NAD  HEENT: Atraumatic, oropharynx clear, neck supple  CHEST/LUNG: unlabored  HEART: Regular rate and rhythm    ABDOMEN: Soft, Nontender, Nondistended   MUSCULOSKELETAL:  No  edema   NERVOUS SYSTEM:  sleepy, briefly rouses to questioning, saying "I want to sleep"  SKIN: No rashes or lesions noted  Oral intake: poor       LABS:                        12.3   4.95  )-----------( 277      ( 2019 07:07 )             37.7     -    139  |  107  |  10  ----------------------------<  190<H>  4.3   |  26  |  0.76    Ca    8.5      2019 07:07  Phos  2.5     -14  Mg     1.9     -    TPro  5.6<L>  /  Alb  2.6<L>  /  TBili  0.3  /  DBili  x   /  AST  13  /  ALT  14  /  AlkPhos  89  11-14    Urinalysis Basic - ( 2019 16:02 )    Color: Yellow / Appearance: Clear / S.015 / pH: x  Gluc: x / Ketone: Negative  / Bili: Negative / Urobili: Negative   Blood: x / Protein: Negative / Nitrite: Negative   Leuk Esterase: Negative / RBC: 0-2 /HPF / WBC 0-2 /HPF   Sq Epi: x / Non Sq Epi: Few /HPF / Bacteria: Few /HPF        RADIOLOGY & ADDITIONAL STUDIES:    ADVANCE DIRECTIVES: MOLST for DNR/DNI

## 2019-11-15 NOTE — PROGRESS NOTE ADULT - SUBJECTIVE AND OBJECTIVE BOX
74 year old female with PMH of dementia, CAD s/p 3 stent, HTN, type 2 DM, and legally blind was brought in by family due to severe agitation and high blood glucose. Endocrinology was consulted for management of uncontrolled type 2 DM, A1c >15.5%.     Patient seen and examined at bedside. She remains tired and has mostly been waking up for meals. She is mostly consuming the carbohydrates on the plate. Pre-lunch BG above goal >200 as patient did not received insulin with breakfast with BG was 94.       MEDICATIONS  (STANDING):  aspirin  chewable 81 milliGRAM(s) Oral daily  atorvastatin 40 milliGRAM(s) Oral at bedtime  buPROPion XL . 75 milliGRAM(s) Oral <User Schedule>  heparin  Injectable 5000 Unit(s) SubCutaneous every 12 hours  insulin glargine Injectable (LANTUS) 10 Unit(s) SubCutaneous at bedtime  insulin lispro (HumaLOG) corrective regimen sliding scale   SubCutaneous three times a day before meals  insulin lispro (HumaLOG) corrective regimen sliding scale   SubCutaneous at bedtime  insulin lispro Injectable (HumaLOG) 4 Unit(s) SubCutaneous three times a day before meals  lisinopril 5 milliGRAM(s) Oral daily  melatonin 3 milliGRAM(s) Oral <User Schedule>  memantine 5 milliGRAM(s) Oral two times a day  sodium chloride 0.9%. 1000 milliLiter(s) (75 mL/Hr) IV Continuous <Continuous>  sodium chloride 0.9%. 1000 milliLiter(s) (75 mL/Hr) IV Continuous <Continuous>    MEDICATIONS  (PRN):  acetaminophen   Tablet .. 650 milliGRAM(s) Oral every 6 hours PRN Moderate Pain (4 - 6)  QUEtiapine 25 milliGRAM(s) Oral at bedtime PRN severe agitation      REVIEW OF SYSTEMS:  CONSTITUTIONAL: fatigue,  No fever or weight loss   EYES: No eye pain, visual disturbances, or discharge  ENMT:  No difficulty hearing, tinnitus, vertigo; No sinus or throat pain  NECK: No pain or stiffness  RESPIRATORY: No cough, wheezing, chills or hemoptysis; No shortness of breath  CARDIOVASCULAR: No chest pain, palpitations, dizziness, or leg swelling  GASTROINTESTINAL: No abdominal or epigastric pain. No nausea, vomiting, or hematemesis; No diarrhea or constipation. No melena or hematochezia.  GENITOURINARY: No dysuria, frequency, hematuria, or incontinence  NEUROLOGICAL: No headaches, memory loss, loss of strength, numbness, or tremors  SKIN: No itching, burning, rashes, or lesions   LYMPH NODES: No enlarged glands  ENDOCRINE: No heat or cold intolerance; No hair loss  MUSCULOSKELETAL: No joint pain or swelling; No muscle, back, or extremity pain  PSYCHIATRIC: No depression, anxiety, mood swings, or difficulty sleeping  HEME/LYMPH: No easy bruising, or bleeding gums  ALLERGY AND IMMUNOLOGIC: No hives or eczema      PHYSICAL EXAM:      VITAL SIGNS  T(C): 36.7 (11-15-19 @ 14:24), Max: 36.8 (11-15-19 @ 05:19)  HR: 81 (11-15-19 @ 14:24) (66 - 81)  BP: 138/73 (11-15-19 @ 14:24) (110/45 - 154/75)  RR: 18 (11-15-19 @ 14:24) (16 - 18)  SpO2: 98% (11-15-19 @ :24) (96% - 98%)      General: lethargic. No acute distress.   Eye: Extraocular movements are intact.    HENT:  Normocephalic.    Respiratory: Respirations are non-labored, Symmetrical chest wall expansion.    Gastrointestinal:  Non-distended.    Neurologic:  Alert and oriented X 1-2, No focal defects, Cranial Nerves II-XII are grossly intact.    Psychiatric:  Cooperative, Appropriate mood & affect.  SKIN: No rashes or lesions    LABS:                        12.3   4.95  )-----------( 277      ( 2019 07:07 )             37.7     -    139  |  107  |  10  ----------------------------<  190<H>  4.3   |  26  |  0.76    Ca    8.5      2019 07:07  Phos  2.5     -  Mg     1.9         TPro  5.6<L>  /  Alb  2.6<L>  /  TBili  0.3  /  DBili  x   /  AST  13  /  ALT  14  /  AlkPhos  89  -      Urinalysis Basic - ( 2019 16:02 )    Color: Yellow / Appearance: Clear / S.015 / pH: x  Gluc: x / Ketone: Negative  / Bili: Negative / Urobili: Negative   Blood: x / Protein: Negative / Nitrite: Negative   Leuk Esterase: Negative / RBC: 0-2 /HPF / WBC 0-2 /HPF   Sq Epi: x / Non Sq Epi: Few /HPF / Bacteria: Few /HPF      CAPILLARY BLOOD GLUCOSE      POCT Blood Glucose.: 235 mg/dL (15 Nov 2019 11:25)  POCT Blood Glucose.: 94 mg/dL (15 Nov 2019 07:52)  POCT Blood Glucose.: 153 mg/dL (2019 22:49)  POCT Blood Glucose.: 150 mg/dL (2019 21:24)  POCT Blood Glucose.: 190 mg/dL (2019 16:36)        Urinalysis Basic - ( 2019 16:02 )    Color: Yellow / Appearance: Clear / S.015 / pH: x  Gluc: x / Ketone: Negative  / Bili: Negative / Urobili: Negative   Blood: x / Protein: Negative / Nitrite: Negative   Leuk Esterase: Negative / RBC: 0-2 /HPF / WBC 0-2 /HPF   Sq Epi: x / Non Sq Epi: Few /HPF / Bacteria: Few /HPF

## 2019-11-15 NOTE — CONSULT NOTE ADULT - PROBLEM SELECTOR RECOMMENDATION 5
Spoke with patient's daughter Dora, who expressed that the patient lives with her and her father. She reports that she has been having difficulty with sleep and has become increasingly agitated and disruptive in the home. They have HHA during the day but have requested increase in hours. Daughter works during the day as a teacher. The patient has Drs on call home visiting service in place. She expressed that they wish for the patient to return home but will need more hours for assistance. YASMIN on file for DNR/DNI. Support provided.

## 2019-11-15 NOTE — PROGRESS NOTE BEHAVIORAL HEALTH - NSBHFUPINTERVALHXFT_PSY_A_CORE
Per report of resident Dr. Becker, pt has been refusing all her prescribed psych medications (melatonin and Wellbutrin), and spit them out when attempt was made to give them to her crushed in applesauce or juice. Pt was seen in her room for f/u, lying in bed extremely somnolent and unarousable as on the 2 prior occasions when interview was attempted. Pt did not awaken to voice or repeated light touch. Writer spoke to Dr. Becker and recommended that Wellbutrin be switched to a stimulant, methylphenidate, which comes in a 24h patch (Daytrana). Per Dr. Becker, Daytrana patch is non-formulary and will require approval, after which it must be ordered by pharmacy. Writer also recommended changing dosage form of melatonin to liquid in effort to get pt to accept it. Dr. Becker agreed to order the liquid for tonight.

## 2019-11-15 NOTE — PROGRESS NOTE ADULT - ASSESSMENT
74 year old female with PMH of dementia, CAD s/p 3 stent, HTN, type 2 DM, and legally blind was brought in by family due to severe agitation and high blood glucose. Endocrinology was consulted for management of uncontrolled type 2 DM, A1c >15.5%.     1. Uncontrolled Type 2 DM, A1c >15.5%, with hyperglycemia  -secondary to medication noncompliance (worsening dementia)  -pre-lunch BG above goal >200 as breakfast time insulin was held  -continue lantus 10 units at bedtime  -continue Humalog 4 units TID with meals  -recommend to continue mealtime rapid acting insulin as below  BG 70-100mg/dL 0 units  -150 mg/dL 0 units  -200 mg/dL 1 units  -250 mg/dL 2 units  -300 mg/dL 3 units  -350 mg/dL 4 units  -400 mg/dL 5 units  BG > 400mg/dL 6 units  -FS AC HS  -ensure consistent carbohydrate diet   -will monitor FS and adjust accordingly   --if patient is NPO for any reason please change FS and sliding scale to NPO lispro scale     2. HTN  -BP at goal  -continue lisinopril    3. Worsening Dementia  -patient with worsening dementia, refusing medications at home   -care as per psych  -safe d/c planning     Plan discussed with primary team  Please  call  w/ any questions or concerns [phone]

## 2019-11-15 NOTE — PROGRESS NOTE BEHAVIORAL HEALTH - OTHER
Somnolent, unarousable Bed-bound Unable to assess (somnolent, unarousable) Difficult to assess due to somnolence

## 2019-11-15 NOTE — CONSULT NOTE ADULT - PROBLEM SELECTOR RECOMMENDATION 9
ambulatory w assist, verbal but confused with sleep disturbance and increasing agitation at home. FAST 6e.

## 2019-11-16 LAB
ALBUMIN SERPL ELPH-MCNC: 2.3 G/DL — LOW (ref 3.5–5)
ALP SERPL-CCNC: 80 U/L — SIGNIFICANT CHANGE UP (ref 40–120)
ALT FLD-CCNC: 13 U/L DA — SIGNIFICANT CHANGE UP (ref 10–60)
ANION GAP SERPL CALC-SCNC: 5 MMOL/L — SIGNIFICANT CHANGE UP (ref 5–17)
ANION GAP SERPL CALC-SCNC: 8 MMOL/L — SIGNIFICANT CHANGE UP (ref 5–17)
AST SERPL-CCNC: 10 U/L — SIGNIFICANT CHANGE UP (ref 10–40)
BILIRUB SERPL-MCNC: 0.2 MG/DL — SIGNIFICANT CHANGE UP (ref 0.2–1.2)
BUN SERPL-MCNC: 12 MG/DL — SIGNIFICANT CHANGE UP (ref 7–18)
BUN SERPL-MCNC: 16 MG/DL — SIGNIFICANT CHANGE UP (ref 7–18)
CALCIUM SERPL-MCNC: 7.8 MG/DL — LOW (ref 8.4–10.5)
CALCIUM SERPL-MCNC: 8.5 MG/DL — SIGNIFICANT CHANGE UP (ref 8.4–10.5)
CHLORIDE SERPL-SCNC: 106 MMOL/L — SIGNIFICANT CHANGE UP (ref 96–108)
CHLORIDE SERPL-SCNC: 108 MMOL/L — SIGNIFICANT CHANGE UP (ref 96–108)
CO2 SERPL-SCNC: 26 MMOL/L — SIGNIFICANT CHANGE UP (ref 22–31)
CO2 SERPL-SCNC: 26 MMOL/L — SIGNIFICANT CHANGE UP (ref 22–31)
CREAT SERPL-MCNC: 0.83 MG/DL — SIGNIFICANT CHANGE UP (ref 0.5–1.3)
CREAT SERPL-MCNC: 0.87 MG/DL — SIGNIFICANT CHANGE UP (ref 0.5–1.3)
GLUCOSE BLDC GLUCOMTR-MCNC: 219 MG/DL — HIGH (ref 70–99)
GLUCOSE BLDC GLUCOMTR-MCNC: 248 MG/DL — HIGH (ref 70–99)
GLUCOSE BLDC GLUCOMTR-MCNC: 248 MG/DL — HIGH (ref 70–99)
GLUCOSE BLDC GLUCOMTR-MCNC: 277 MG/DL — HIGH (ref 70–99)
GLUCOSE SERPL-MCNC: 284 MG/DL — HIGH (ref 70–99)
GLUCOSE SERPL-MCNC: 302 MG/DL — HIGH (ref 70–99)
HCT VFR BLD CALC: 35.2 % — SIGNIFICANT CHANGE UP (ref 34.5–45)
HGB BLD-MCNC: 11.4 G/DL — LOW (ref 11.5–15.5)
MCHC RBC-ENTMCNC: 29.2 PG — SIGNIFICANT CHANGE UP (ref 27–34)
MCHC RBC-ENTMCNC: 32.4 GM/DL — SIGNIFICANT CHANGE UP (ref 32–36)
MCV RBC AUTO: 90 FL — SIGNIFICANT CHANGE UP (ref 80–100)
NRBC # BLD: 0 /100 WBCS — SIGNIFICANT CHANGE UP (ref 0–0)
PLATELET # BLD AUTO: 232 K/UL — SIGNIFICANT CHANGE UP (ref 150–400)
POTASSIUM SERPL-MCNC: 2.9 MMOL/L — CRITICAL LOW (ref 3.5–5.3)
POTASSIUM SERPL-MCNC: 3.9 MMOL/L — SIGNIFICANT CHANGE UP (ref 3.5–5.3)
POTASSIUM SERPL-SCNC: 2.9 MMOL/L — CRITICAL LOW (ref 3.5–5.3)
POTASSIUM SERPL-SCNC: 3.9 MMOL/L — SIGNIFICANT CHANGE UP (ref 3.5–5.3)
PROT SERPL-MCNC: 5.3 G/DL — LOW (ref 6–8.3)
RBC # BLD: 3.91 M/UL — SIGNIFICANT CHANGE UP (ref 3.8–5.2)
RBC # FLD: 12.5 % — SIGNIFICANT CHANGE UP (ref 10.3–14.5)
SODIUM SERPL-SCNC: 139 MMOL/L — SIGNIFICANT CHANGE UP (ref 135–145)
SODIUM SERPL-SCNC: 140 MMOL/L — SIGNIFICANT CHANGE UP (ref 135–145)
WBC # BLD: 8.65 K/UL — SIGNIFICANT CHANGE UP (ref 3.8–10.5)
WBC # FLD AUTO: 8.65 K/UL — SIGNIFICANT CHANGE UP (ref 3.8–10.5)

## 2019-11-16 PROCEDURE — 99232 SBSQ HOSP IP/OBS MODERATE 35: CPT | Mod: GC

## 2019-11-16 PROCEDURE — 99232 SBSQ HOSP IP/OBS MODERATE 35: CPT

## 2019-11-16 RX ORDER — INSULIN GLARGINE 100 [IU]/ML
14 INJECTION, SOLUTION SUBCUTANEOUS AT BEDTIME
Refills: 0 | Status: DISCONTINUED | OUTPATIENT
Start: 2019-11-16 | End: 2019-11-16

## 2019-11-16 RX ORDER — POTASSIUM CHLORIDE 20 MEQ
10 PACKET (EA) ORAL
Refills: 0 | Status: COMPLETED | OUTPATIENT
Start: 2019-11-16 | End: 2019-11-16

## 2019-11-16 RX ORDER — POTASSIUM CHLORIDE 20 MEQ
40 PACKET (EA) ORAL ONCE
Refills: 0 | Status: COMPLETED | OUTPATIENT
Start: 2019-11-16 | End: 2019-11-16

## 2019-11-16 RX ORDER — POTASSIUM CHLORIDE 20 MEQ
40 PACKET (EA) ORAL EVERY 4 HOURS
Refills: 0 | Status: DISCONTINUED | OUTPATIENT
Start: 2019-11-16 | End: 2019-11-16

## 2019-11-16 RX ORDER — BUPROPION HYDROCHLORIDE 150 MG/1
75 TABLET, EXTENDED RELEASE ORAL
Refills: 0 | Status: DISCONTINUED | OUTPATIENT
Start: 2019-11-16 | End: 2019-11-18

## 2019-11-16 RX ORDER — INSULIN GLARGINE 100 [IU]/ML
10 INJECTION, SOLUTION SUBCUTANEOUS AT BEDTIME
Refills: 0 | Status: DISCONTINUED | OUTPATIENT
Start: 2019-11-16 | End: 2019-11-17

## 2019-11-16 RX ORDER — POTASSIUM CHLORIDE 20 MEQ
10 PACKET (EA) ORAL
Refills: 0 | Status: DISCONTINUED | OUTPATIENT
Start: 2019-11-16 | End: 2019-11-16

## 2019-11-16 RX ORDER — BUPROPION HYDROCHLORIDE 150 MG/1
75 TABLET, EXTENDED RELEASE ORAL
Refills: 0 | Status: DISCONTINUED | OUTPATIENT
Start: 2019-11-16 | End: 2019-11-16

## 2019-11-16 RX ADMIN — Medication 100 MILLIEQUIVALENT(S): at 18:25

## 2019-11-16 RX ADMIN — ATORVASTATIN CALCIUM 40 MILLIGRAM(S): 80 TABLET, FILM COATED ORAL at 21:17

## 2019-11-16 RX ADMIN — Medication 4 UNIT(S): at 08:32

## 2019-11-16 RX ADMIN — LISINOPRIL 5 MILLIGRAM(S): 2.5 TABLET ORAL at 05:32

## 2019-11-16 RX ADMIN — MEMANTINE HYDROCHLORIDE 5 MILLIGRAM(S): 10 TABLET ORAL at 17:16

## 2019-11-16 RX ADMIN — Medication 40 MILLIEQUIVALENT(S): at 14:42

## 2019-11-16 RX ADMIN — Medication 100 MILLIEQUIVALENT(S): at 16:09

## 2019-11-16 RX ADMIN — Medication 81 MILLIGRAM(S): at 11:06

## 2019-11-16 RX ADMIN — HEPARIN SODIUM 5000 UNIT(S): 5000 INJECTION INTRAVENOUS; SUBCUTANEOUS at 17:15

## 2019-11-16 RX ADMIN — BUPROPION HYDROCHLORIDE 75 MILLIGRAM(S): 150 TABLET, EXTENDED RELEASE ORAL at 23:33

## 2019-11-16 RX ADMIN — Medication 2: at 21:17

## 2019-11-16 RX ADMIN — Medication 100 MILLIEQUIVALENT(S): at 17:15

## 2019-11-16 RX ADMIN — Medication 4 UNIT(S): at 11:18

## 2019-11-16 RX ADMIN — Medication 4 UNIT(S): at 16:11

## 2019-11-16 RX ADMIN — HEPARIN SODIUM 5000 UNIT(S): 5000 INJECTION INTRAVENOUS; SUBCUTANEOUS at 05:31

## 2019-11-16 RX ADMIN — Medication 4: at 11:16

## 2019-11-16 RX ADMIN — Medication 4: at 16:10

## 2019-11-16 RX ADMIN — Medication 4: at 08:31

## 2019-11-16 RX ADMIN — Medication 3 MILLIGRAM(S): at 19:17

## 2019-11-16 RX ADMIN — INSULIN GLARGINE 10 UNIT(S): 100 INJECTION, SOLUTION SUBCUTANEOUS at 21:17

## 2019-11-16 RX ADMIN — MEMANTINE HYDROCHLORIDE 5 MILLIGRAM(S): 10 TABLET ORAL at 05:32

## 2019-11-16 RX ADMIN — BUPROPION HYDROCHLORIDE 75 MILLIGRAM(S): 150 TABLET, EXTENDED RELEASE ORAL at 11:06

## 2019-11-16 NOTE — PROGRESS NOTE ADULT - SUBJECTIVE AND OBJECTIVE BOX
PGY 3 Note     Patient is a 74y old  Female who presents with a chief complaint of High blood glucose and agitation (15 Nov 2019 17:32)      INTERVAL HPI/OVERNIGHT EVENTS: Patient seen and examined at bed side. Patient is sleeping. As per Nurse patient was more awake last night and took some of her medications including memantine   MEDICATIONS  (STANDING):  aspirin  chewable 81 milliGRAM(s) Oral daily  atorvastatin 40 milliGRAM(s) Oral at bedtime  buPROPion . 75 milliGRAM(s) Oral two times a day  heparin  Injectable 5000 Unit(s) SubCutaneous every 12 hours  insulin glargine Injectable (LANTUS) 14 Unit(s) SubCutaneous at bedtime  insulin lispro (HumaLOG) corrective regimen sliding scale   SubCutaneous three times a day before meals  insulin lispro (HumaLOG) corrective regimen sliding scale   SubCutaneous at bedtime  insulin lispro Injectable (HumaLOG) 4 Unit(s) SubCutaneous three times a day before meals  lisinopril 5 milliGRAM(s) Oral daily  melatonin 3 milliGRAM(s) Oral <User Schedule>  memantine 5 milliGRAM(s) Oral two times a day    MEDICATIONS  (PRN):  acetaminophen   Tablet .. 650 milliGRAM(s) Oral every 6 hours PRN Moderate Pain (4 - 6)  QUEtiapine 25 milliGRAM(s) Oral at bedtime PRN severe agitation      __________________________________________________  REVIEW OF SYSTEMS: limited due to advance dementia     CONSTITUTIONAL: No fever,   EYES: legally blind   NECK: unable to assess  RESPIRATORY: No cough; No shortness of breath  CARDIOVASCULAR:unable to assess   GASTROINTESTINAL: No pain. No nausea or vomiting; No diarrhea   NEUROLOGICAL: dementia ,no aggressive but disturbed sleep wake cycle   MUSCULOSKELETAL: unable to assess  GENITOURINARY: unable to assess but patient wears diapers   PSYCHIATRY: advance dementia   ALL OTHER  ROS negative        Vital Signs Last 24 Hrs  T(C): 36.8 (2019 05:00), Max: 36.8 (15 Nov 2019 20:36)  T(F): 98.3 (2019 05:00), Max: 98.3 (15 Nov 2019 20:36)  HR: 76 (2019 05:00) (76 - 84)  BP: 150/59 (2019 05:00) (125/35 - 150/59)  BP(mean): --  RR: 18 (2019 05:00) (17 - 18)  SpO2: 99% (2019 05:00) (98% - 99%)    ________________________________________________  PHYSICAL EXAM:  GENERAL: NAD  HEENT: Normocephalic;  conjunctivae and sclerae clear; moist mucous membranes;   NECK : supple  CHEST/LUNG: Clear to auscultation bilaterally with good air entry   HEART: S1 S2  regular; no murmurs, gallops or rubs  ABDOMEN: Soft, Nontender, Nondistended; Bowel sounds present  EXTREMITIES: no cyanosis; no edema; no calf tenderness  SKIN: warm and dry; no rash  NERVOUS SYSTEM:  arousal to verbal stimuli. not oriented     _________________________________________________  LABS:            Urinalysis Basic - ( 2019 16:02 )    Color: Yellow / Appearance: Clear / S.015 / pH: x  Gluc: x / Ketone: Negative  / Bili: Negative / Urobili: Negative   Blood: x / Protein: Negative / Nitrite: Negative   Leuk Esterase: Negative / RBC: 0-2 /HPF / WBC 0-2 /HPF   Sq Epi: x / Non Sq Epi: Few /HPF / Bacteria: Few /HPF      CAPILLARY BLOOD GLUCOSE      POCT Blood Glucose.: 248 mg/dL (2019 07:53)  POCT Blood Glucose.: 369 mg/dL (15 Nov 2019 21:14)  POCT Blood Glucose.: 126 mg/dL (15 Nov 2019 16:34)  POCT Blood Glucose.: 235 mg/dL (15 Nov 2019 11:25)        RADIOLOGY & ADDITIONAL TESTS:    Imaging Personally Reviewed:  YES    Consultant(s) Notes Reviewed:   YES    Care Discussed with Consultants :     Plan of care was discussed with patient and /or primary care giver; all questions and concerns were addressed and care was aligned with patient's wishes.

## 2019-11-16 NOTE — PROGRESS NOTE ADULT - PROBLEM SELECTOR PLAN 4
-Will try to administer medicine today. if she doesn't take it, we have to speak with psychiatry for alternative options.   -Patient's sleep wake cycle needs to be restored  -  -seroquel was decreased to 25mg at bed time prn and added melatonin 7 pm and wellbutrin 75mg in morning as per psychiatry recommendation  However patient did not take any medication yesterday.  Psych. Dr. Dubois' consult appreciated   - consult for placement. possible long term/increase hours if family agrees  Palliative consulted to help in goals of care and planning for post-hospital care. -c/w Wellbutrin 75mg BID   -Patient's sleep wake cycle needs to be restored  -Seroquel  25mg at bed time prn   -c/w melatonin 7 pm and wellbutrin 75mg bid  -She took memantin and lisinopril last night  -will see if she takes rest of her meds today  -Spoke with pharmacy. daytran needs approval and needs to be ordered by pharmacy. follow up with Pharmacy on Monday and see if meds have been ordered.  Psych. Dr. Dubois consult appreciated   -SW/ CM following  for planning for post-hospital care. possible long term/increase hours if family agrees  -DNR/DNI

## 2019-11-16 NOTE — PROGRESS NOTE ADULT - ASSESSMENT
74 year old female with PMH of dementia, CAD s/p 3 stent, HTN, type 2 DM, and legally blind was brought in by family due to severe agitation and high blood glucose. Endocrinology was consulted for management of uncontrolled type 2 DM, A1c >15.5%.     1. Uncontrolled Type 2 DM, A1c >15.5%, with hyperglycemia  -secondary to medication noncompliance (worsening dementia)  -bedtime BG >300 as patient's dinner time insulin was held yesterday (also breakfast time insulin was held)  -recommend to continue Lantus 10 units at bedtime (patient dropped 100 points overnight)  -continue Humalog 4 units TID with meals  -recommend to continue mealtime rapid acting insulin as below  BG 70-100mg/dL 0 units  -150 mg/dL 0 units  -200 mg/dL 1 units  -250 mg/dL 2 units  -300 mg/dL 3 units  -350 mg/dL 4 units  -400 mg/dL 5 units  BG > 400mg/dL 6 units  -FS AC HS  -ensure consistent carbohydrate diet   -will monitor FS and adjust accordingly   --if patient is NPO for any reason please change FS and sliding scale to NPO lispro scale   -patient is blind and cannot feed herself, instructed nurse at bedside to provide assistance    2. HTN  -BP acceptable  -continue lisinopril    3. Worsening Dementia  -patient with worsening dementia, refusing medications at home   -care as per psych  -safe d/c planning     Plan discussed with primary team  Please  call  w/ any questions or concerns [phone]

## 2019-11-16 NOTE — PROGRESS NOTE ADULT - ATTENDING COMMENTS
Patient seen/evaluated at bedside 11/16/19 I agree with the resident progress note/outlined plan of care. My independent findings and conclusions are documented.    Pt maintains eye in closed position though she nods "yes" and "no".  ROS not meaningfully obtained    1. DM type II on long term insulin w/ hyperglycemia  2. Dementia  3. CAD    appreciate discussion w/ Endo due to missed doses of prandial insulin rec to maintain at current regimen of   lantus 10 and lispro 4 unit tid--> monitor for further adjustments w/in 24 hours  behaviorally controlled today  rest of plan as above

## 2019-11-16 NOTE — PROGRESS NOTE ADULT - PROBLEM SELECTOR PLAN 1
this am   -High carb meals with juice and bread. As she doesn't take anything else as per nurse. even meds needs to be mixed in juice.   -Increase lantus 14 units . keep Humalog 4 units TID as she has not had   -Patient has been having variables high carb meals with 'bread'  -Hba1c is >15  -Will start lantus 10 units in the morning    -Will -endo consult Dr Ronquillo  this am   -High carb meals with juice and bread. As she doesn't take anything else as per nurse. even meds needs to be mixed in juice.   -Increase lantus 14 units . keep Humalog 4 units TID as her meal timing varies due to disturbed circadian rhythm   -Patient has been having variables high carb meals with 'bread'  -Hba1c is >15  -Endocrinology Dr Ronquillo

## 2019-11-16 NOTE — PROGRESS NOTE ADULT - SUBJECTIVE AND OBJECTIVE BOX
74 year old female with PMH of dementia, CAD s/p 3 stent, HTN, type 2 DM, and legally blind was brought in by family due to severe agitation and high blood glucose. Endocrinology was consulted for management of uncontrolled type 2 DM, A1c >15.5%.     Patient seen and examined at bedside. She is blind and has been unable to eat without assistance. She did not receive several doses of mealtime insulin yesterday with resulting hyperglycemia with BG >300.    MEDICATIONS  (STANDING):  aspirin  chewable 81 milliGRAM(s) Oral daily  atorvastatin 40 milliGRAM(s) Oral at bedtime  buPROPion . 75 milliGRAM(s) Oral two times a day  heparin  Injectable 5000 Unit(s) SubCutaneous every 12 hours  insulin glargine Injectable (LANTUS) 14 Unit(s) SubCutaneous at bedtime  insulin lispro (HumaLOG) corrective regimen sliding scale   SubCutaneous three times a day before meals  insulin lispro (HumaLOG) corrective regimen sliding scale   SubCutaneous at bedtime  insulin lispro Injectable (HumaLOG) 4 Unit(s) SubCutaneous three times a day before meals  lisinopril 5 milliGRAM(s) Oral daily  melatonin 3 milliGRAM(s) Oral <User Schedule>  memantine 5 milliGRAM(s) Oral two times a day  potassium chloride    Tablet ER 40 milliEquivalent(s) Oral every 4 hours    MEDICATIONS  (PRN):  acetaminophen   Tablet .. 650 milliGRAM(s) Oral every 6 hours PRN Moderate Pain (4 - 6)  QUEtiapine 25 milliGRAM(s) Oral at bedtime PRN severe agitation      REVIEW OF SYSTEMS:  CONSTITUTIONAL: fatigue,  No fever or weight loss   EYES: No eye pain, visual disturbances, or discharge  ENMT:  No difficulty hearing, tinnitus, vertigo; No sinus or throat pain  NECK: No pain or stiffness  RESPIRATORY: No cough, wheezing, chills or hemoptysis; No shortness of breath  CARDIOVASCULAR: No chest pain, palpitations, dizziness, or leg swelling  GASTROINTESTINAL: No abdominal or epigastric pain. No nausea, vomiting, or hematemesis; No diarrhea or constipation. No melena or hematochezia.  GENITOURINARY: No dysuria, frequency, hematuria, or incontinence  NEUROLOGICAL: No headaches, memory loss, loss of strength, numbness, or tremors  SKIN: No itching, burning, rashes, or lesions   LYMPH NODES: No enlarged glands  ENDOCRINE: No heat or cold intolerance; No hair loss  MUSCULOSKELETAL: No joint pain or swelling; No muscle, back, or extremity pain  PSYCHIATRIC: No depression, anxiety, mood swings, or difficulty sleeping  HEME/LYMPH: No easy bruising, or bleeding gums  ALLERGY AND IMMUNOLOGIC: No hives or eczema        PHYSICAL EXAM:  VITAL SIGNS  T(C): 36.8 (19 @ 05:00), Max: 36.8 (11-15-19 @ 20:36)  HR: 76 (19 @ 05:00) (76 - 84)  BP: 150/59 (19 @ 05:00) (125/35 - 150/59)  RR: 18 (19 @ 05:00) (17 - 18)  SpO2: 99% (19 @ 05:00) (98% - 99%)    General: lethargic. No acute distress.   Eye: Extraocular movements are intact.    HENT:  Normocephalic.    Respiratory: Respirations are non-labored, Symmetrical chest wall expansion.    Gastrointestinal:  Non-distended.    Neurologic:  Alert and oriented X 1-2, No focal defects, Cranial Nerves II-XII are grossly intact.    Psychiatric:  Cooperative, Appropriate mood & affect.  SKIN: No rashes or lesions      LABS:                        11.4   8.65  )-----------( 232      ( 2019 10:42 )             35.2     -    140  |  106  |  12  ----------------------------<  284<H>  2.9<LL>   |  26  |  0.83    Ca    8.5      2019 10:42    TPro  5.3<L>  /  Alb  2.3<L>  /  TBili  0.2  /  DBili  x   /  AST  10  /  ALT  13  /  AlkPhos  80        Urinalysis Basic - ( 2019 16:02 )    Color: Yellow / Appearance: Clear / S.015 / pH: x  Gluc: x / Ketone: Negative  / Bili: Negative / Urobili: Negative   Blood: x / Protein: Negative / Nitrite: Negative   Leuk Esterase: Negative / RBC: 0-2 /HPF / WBC 0-2 /HPF   Sq Epi: x / Non Sq Epi: Few /HPF / Bacteria: Few /HPF      CAPILLARY BLOOD GLUCOSE      POCT Blood Glucose.: 248 mg/dL (2019 11:11)  POCT Blood Glucose.: 248 mg/dL (2019 07:53)  POCT Blood Glucose.: 369 mg/dL (15 Nov 2019 21:14)  POCT Blood Glucose.: 126 mg/dL (15 Nov 2019 16:34)        Urinalysis Basic - ( 2019 16:02 )    Color: Yellow / Appearance: Clear / S.015 / pH: x  Gluc: x / Ketone: Negative  / Bili: Negative / Urobili: Negative   Blood: x / Protein: Negative / Nitrite: Negative   Leuk Esterase: Negative / RBC: 0-2 /HPF / WBC 0-2 /HPF   Sq Epi: x / Non Sq Epi: Few /HPF / Bacteria: Few /HPF

## 2019-11-17 LAB
ALBUMIN SERPL ELPH-MCNC: 2.2 G/DL — LOW (ref 3.5–5)
ALP SERPL-CCNC: 83 U/L — SIGNIFICANT CHANGE UP (ref 40–120)
ALT FLD-CCNC: 11 U/L DA — SIGNIFICANT CHANGE UP (ref 10–60)
ANION GAP SERPL CALC-SCNC: 7 MMOL/L — SIGNIFICANT CHANGE UP (ref 5–17)
AST SERPL-CCNC: 11 U/L — SIGNIFICANT CHANGE UP (ref 10–40)
BILIRUB SERPL-MCNC: 0.3 MG/DL — SIGNIFICANT CHANGE UP (ref 0.2–1.2)
BUN SERPL-MCNC: 14 MG/DL — SIGNIFICANT CHANGE UP (ref 7–18)
CALCIUM SERPL-MCNC: 8.4 MG/DL — SIGNIFICANT CHANGE UP (ref 8.4–10.5)
CHLORIDE SERPL-SCNC: 104 MMOL/L — SIGNIFICANT CHANGE UP (ref 96–108)
CO2 SERPL-SCNC: 25 MMOL/L — SIGNIFICANT CHANGE UP (ref 22–31)
CREAT SERPL-MCNC: 0.81 MG/DL — SIGNIFICANT CHANGE UP (ref 0.5–1.3)
GLUCOSE BLDC GLUCOMTR-MCNC: 197 MG/DL — HIGH (ref 70–99)
GLUCOSE BLDC GLUCOMTR-MCNC: 283 MG/DL — HIGH (ref 70–99)
GLUCOSE BLDC GLUCOMTR-MCNC: 292 MG/DL — HIGH (ref 70–99)
GLUCOSE BLDC GLUCOMTR-MCNC: 392 MG/DL — HIGH (ref 70–99)
GLUCOSE BLDC GLUCOMTR-MCNC: 415 MG/DL — HIGH (ref 70–99)
GLUCOSE BLDC GLUCOMTR-MCNC: 445 MG/DL — HIGH (ref 70–99)
GLUCOSE SERPL-MCNC: 311 MG/DL — HIGH (ref 70–99)
HCT VFR BLD CALC: 33.6 % — LOW (ref 34.5–45)
HGB BLD-MCNC: 10.9 G/DL — LOW (ref 11.5–15.5)
MCHC RBC-ENTMCNC: 29.2 PG — SIGNIFICANT CHANGE UP (ref 27–34)
MCHC RBC-ENTMCNC: 32.4 GM/DL — SIGNIFICANT CHANGE UP (ref 32–36)
MCV RBC AUTO: 90.1 FL — SIGNIFICANT CHANGE UP (ref 80–100)
NRBC # BLD: 0 /100 WBCS — SIGNIFICANT CHANGE UP (ref 0–0)
PLATELET # BLD AUTO: 221 K/UL — SIGNIFICANT CHANGE UP (ref 150–400)
POTASSIUM SERPL-MCNC: 3.7 MMOL/L — SIGNIFICANT CHANGE UP (ref 3.5–5.3)
POTASSIUM SERPL-SCNC: 3.7 MMOL/L — SIGNIFICANT CHANGE UP (ref 3.5–5.3)
PROT SERPL-MCNC: 5.4 G/DL — LOW (ref 6–8.3)
RBC # BLD: 3.73 M/UL — LOW (ref 3.8–5.2)
RBC # FLD: 12.5 % — SIGNIFICANT CHANGE UP (ref 10.3–14.5)
SODIUM SERPL-SCNC: 136 MMOL/L — SIGNIFICANT CHANGE UP (ref 135–145)
WBC # BLD: 7.81 K/UL — SIGNIFICANT CHANGE UP (ref 3.8–10.5)
WBC # FLD AUTO: 7.81 K/UL — SIGNIFICANT CHANGE UP (ref 3.8–10.5)

## 2019-11-17 PROCEDURE — 99232 SBSQ HOSP IP/OBS MODERATE 35: CPT

## 2019-11-17 PROCEDURE — 99233 SBSQ HOSP IP/OBS HIGH 50: CPT

## 2019-11-17 RX ORDER — INSULIN GLARGINE 100 [IU]/ML
15 INJECTION, SOLUTION SUBCUTANEOUS AT BEDTIME
Refills: 0 | Status: DISCONTINUED | OUTPATIENT
Start: 2019-11-17 | End: 2019-11-18

## 2019-11-17 RX ORDER — INSULIN LISPRO 100/ML
7 VIAL (ML) SUBCUTANEOUS
Refills: 0 | Status: DISCONTINUED | OUTPATIENT
Start: 2019-11-17 | End: 2019-11-18

## 2019-11-17 RX ADMIN — Medication 3 MILLIGRAM(S): at 19:04

## 2019-11-17 RX ADMIN — BUPROPION HYDROCHLORIDE 75 MILLIGRAM(S): 150 TABLET, EXTENDED RELEASE ORAL at 05:32

## 2019-11-17 RX ADMIN — BUPROPION HYDROCHLORIDE 75 MILLIGRAM(S): 150 TABLET, EXTENDED RELEASE ORAL at 17:00

## 2019-11-17 RX ADMIN — HEPARIN SODIUM 5000 UNIT(S): 5000 INJECTION INTRAVENOUS; SUBCUTANEOUS at 17:01

## 2019-11-17 RX ADMIN — HEPARIN SODIUM 5000 UNIT(S): 5000 INJECTION INTRAVENOUS; SUBCUTANEOUS at 05:32

## 2019-11-17 RX ADMIN — Medication 81 MILLIGRAM(S): at 11:42

## 2019-11-17 RX ADMIN — Medication 4 UNIT(S): at 11:42

## 2019-11-17 RX ADMIN — MEMANTINE HYDROCHLORIDE 5 MILLIGRAM(S): 10 TABLET ORAL at 05:32

## 2019-11-17 RX ADMIN — LISINOPRIL 5 MILLIGRAM(S): 2.5 TABLET ORAL at 05:32

## 2019-11-17 RX ADMIN — INSULIN GLARGINE 15 UNIT(S): 100 INJECTION, SOLUTION SUBCUTANEOUS at 22:21

## 2019-11-17 RX ADMIN — MEMANTINE HYDROCHLORIDE 5 MILLIGRAM(S): 10 TABLET ORAL at 17:00

## 2019-11-17 RX ADMIN — Medication 6: at 08:29

## 2019-11-17 RX ADMIN — Medication 12: at 16:37

## 2019-11-17 RX ADMIN — Medication 4 UNIT(S): at 08:30

## 2019-11-17 RX ADMIN — Medication 7 UNIT(S): at 16:38

## 2019-11-17 RX ADMIN — Medication 6: at 11:42

## 2019-11-17 NOTE — PROGRESS NOTE ADULT - SUBJECTIVE AND OBJECTIVE BOX
74 year old female with PMH of dementia, CAD s/p 3 stent, HTN, type 2 DM, and legally blind was brought in by family due to severe agitation and high blood glucose. Endocrinology was consulted for management of uncontrolled type 2 DM, A1c >15.5%.     Patient seen and examined at bedside. She reports eating better now that she is receiving assistance. FS reviewed. BG remains above goal >250 despite Lantus 10 units at bedtime and Humalog 4 units TID with meals.     MEDICATIONS  (STANDING):  aspirin  chewable 81 milliGRAM(s) Oral daily  atorvastatin 40 milliGRAM(s) Oral at bedtime  buPROPion . 75 milliGRAM(s) Oral two times a day  heparin  Injectable 5000 Unit(s) SubCutaneous every 12 hours  insulin glargine Injectable (LANTUS) 10 Unit(s) SubCutaneous at bedtime  insulin lispro (HumaLOG) corrective regimen sliding scale   SubCutaneous three times a day before meals  insulin lispro (HumaLOG) corrective regimen sliding scale   SubCutaneous at bedtime  insulin lispro Injectable (HumaLOG) 4 Unit(s) SubCutaneous three times a day before meals  lisinopril 5 milliGRAM(s) Oral daily  melatonin 3 milliGRAM(s) Oral <User Schedule>  memantine 5 milliGRAM(s) Oral two times a day    MEDICATIONS  (PRN):  acetaminophen   Tablet .. 650 milliGRAM(s) Oral every 6 hours PRN Moderate Pain (4 - 6)  QUEtiapine 25 milliGRAM(s) Oral at bedtime PRN severe agitation      REVIEW OF SYSTEMS:  CONSTITUTIONAL: fatigue,  No fever or weight loss   EYES: patient is blind, No eye pain, visual disturbances, or discharge  ENMT:  No difficulty hearing, tinnitus, vertigo; No sinus or throat pain  NECK: No pain or stiffness  RESPIRATORY: No cough, wheezing, chills or hemoptysis; No shortness of breath  CARDIOVASCULAR: No chest pain, palpitations, dizziness, or leg swelling  GASTROINTESTINAL: No abdominal or epigastric pain. No nausea, vomiting, or hematemesis; No diarrhea or constipation. No melena or hematochezia.  GENITOURINARY: No dysuria, frequency, hematuria, or incontinence  NEUROLOGICAL: No headaches, memory loss, loss of strength, numbness, or tremors  SKIN: No itching, burning, rashes, or lesions   LYMPH NODES: No enlarged glands  ENDOCRINE: No heat or cold intolerance; No hair loss  MUSCULOSKELETAL: No joint pain or swelling; No muscle, back, or extremity pain  PSYCHIATRIC: No depression, anxiety, mood swings, or difficulty sleeping  HEME/LYMPH: No easy bruising, or bleeding gums  ALLERGY AND IMMUNOLOGIC: No hives or eczema        PHYSICAL EXAM:    VITAL SIGNS  T(C): 36.4 (11-17-19 @ 05:21), Max: 37 (11-16-19 @ 20:35)  HR: 79 (11-17-19 @ 05:21) (78 - 80)  BP: 155/58 (11-17-19 @ 05:21) (118/44 - 155/58)  RR: 17 (11-17-19 @ 05:21) (17 - 18)  SpO2: 98% (11-17-19 @ 05:21) (98% - 100%)    General: lethargic. No acute distress.   Eye: Extraocular movements are intact.    HENT:  Normocephalic.    Respiratory: Respirations are non-labored, Symmetrical chest wall expansion.    Gastrointestinal:  Non-distended.    Neurologic:  Alert and oriented X 1-2, No focal defects, Cranial Nerves II-XII are grossly intact.    Psychiatric:  Cooperative, Appropriate mood & affect.  SKIN: No rashes or lesions        LABS:                        10.9   7.81  )-----------( 221      ( 17 Nov 2019 07:02 )             33.6     11-17    136  |  104  |  14  ----------------------------<  311<H>  3.7   |  25  |  0.81    Ca    8.4      17 Nov 2019 07:02    TPro  5.4<L>  /  Alb  2.2<L>  /  TBili  0.3  /  DBili  x   /  AST  11  /  ALT  11  /  AlkPhos  83  11-17        CAPILLARY BLOOD GLUCOSE      POCT Blood Glucose.: 283 mg/dL (17 Nov 2019 11:13)  POCT Blood Glucose.: 292 mg/dL (17 Nov 2019 07:52)  POCT Blood Glucose.: 277 mg/dL (16 Nov 2019 20:48)  POCT Blood Glucose.: 219 mg/dL (16 Nov 2019 15:53)

## 2019-11-17 NOTE — PROGRESS NOTE ADULT - PROBLEM SELECTOR PLAN 1
this am   -High carb meals with juice and bread. As she doesn't take anything else as per nurse. even meds needs to be mixed in juice.   -Increase lantus 14 units . keep Humalog 4 units TID as her meal timing varies due to disturbed circadian rhythm   -Patient has been having variables high carb meals with 'bread'  -Hba1c is >15  -Endocrinology Dr Ronquillo appreciate input and discussion w/ endocrinology  pt's family reports some expected difficulties with 4 insulin injections per day  will monitor on newly increased dose of lantus 15 units tonight. Potential discharge on regimen of lantus + prandin tomorrow

## 2019-11-17 NOTE — PROGRESS NOTE ADULT - ASSESSMENT
74 year old female with PMH of dementia, CAD s/p 3 stent, HTN, type 2 DM, and legally blind was brought in by family due to severe agitation and high blood glucose. Endocrinology was consulted for management of uncontrolled type 2 DM, A1c >15.5%.     1. Uncontrolled Type 2 DM, A1c >15.5%, with hyperglycemia  -secondary to medication noncompliance (worsening dementia)  -patient now eating better with assistance   -Increase to Lantus 15 units at bedtime  -Increase to Humalog 7 units TID with meals  -recommend to continue mealtime rapid acting insulin as below  BG 70-100mg/dL 0 units  -150 mg/dL 0 units  -200 mg/dL 1 units  -250 mg/dL 2 units  -300 mg/dL 3 units  -350 mg/dL 4 units  -400 mg/dL 5 units  BG > 400mg/dL 6 units  -FS AC HS  -ensure consistent carbohydrate diet   -will monitor FS and adjust accordingly   --if patient is NPO for any reason please change FS and sliding scale to NPO lispro scale   -patient is blind and cannot feed herself, instructed nurse at bedside to provide assistance    2. HTN  -BP acceptable  -continue lisinopril    3. Worsening Dementia  -patient with worsening dementia, refusing medications at home   -care as per psych  -safe d/c planning     Plan discussed with primary team  Please  call  w/ any questions or concerns [phone]

## 2019-11-17 NOTE — PROGRESS NOTE ADULT - SUBJECTIVE AND OBJECTIVE BOX
Patient is a 74y old  Female who presents with a chief complaint of High blood glucose and agitation (17 Nov 2019 13:57)      INTERVAL HPI/OVERNIGHT EVENTS: no new complaints    MEDICATIONS  (STANDING):  aspirin  chewable 81 milliGRAM(s) Oral daily  atorvastatin 40 milliGRAM(s) Oral at bedtime  buPROPion . 75 milliGRAM(s) Oral two times a day  heparin  Injectable 5000 Unit(s) SubCutaneous every 12 hours  insulin glargine Injectable (LANTUS) 15 Unit(s) SubCutaneous at bedtime  insulin lispro (HumaLOG) corrective regimen sliding scale   SubCutaneous three times a day before meals  insulin lispro (HumaLOG) corrective regimen sliding scale   SubCutaneous at bedtime  insulin lispro Injectable (HumaLOG) 7 Unit(s) SubCutaneous three times a day before meals  lisinopril 5 milliGRAM(s) Oral daily  melatonin 3 milliGRAM(s) Oral <User Schedule>  memantine 5 milliGRAM(s) Oral two times a day    MEDICATIONS  (PRN):  acetaminophen   Tablet .. 650 milliGRAM(s) Oral every 6 hours PRN Moderate Pain (4 - 6)  QUEtiapine 25 milliGRAM(s) Oral at bedtime PRN severe agitation      __________________________________________________  REVIEW OF SYSTEMS:    CONSTITUTIONAL: No fever,   EYES: no acute visual disturbances  NECK: No pain or stiffness  RESPIRATORY: No cough; No shortness of breath  CARDIOVASCULAR: No chest pain, no palpitations  GASTROINTESTINAL: No pain. No nausea or vomiting; No diarrhea   NEUROLOGICAL: No headache or numbness, no tremors  MUSCULOSKELETAL: No joint pain, no muscle pain  GENITOURINARY: no dysuria, no frequency, no hesitancy  PSYCHIATRY: no depression , no anxiety  ALL OTHER  ROS negative        Vital Signs Last 24 Hrs  T(C): 36.7 (17 Nov 2019 14:34), Max: 37 (16 Nov 2019 20:35)  T(F): 98 (17 Nov 2019 14:34), Max: 98.6 (16 Nov 2019 20:35)  HR: 79 (17 Nov 2019 14:34) (79 - 80)  BP: 157/57 (17 Nov 2019 14:34) (118/44 - 157/57)  BP(mean): --  RR: 18 (17 Nov 2019 14:34) (17 - 18)  SpO2: 98% (17 Nov 2019 14:34) (98% - 98%)    ________________________________________________  PHYSICAL EXAM:  GENERAL: NAD  HEENT: Normocephalic;  conjunctivae and sclerae clear; moist mucous membranes;   NECK : supple  CHEST/LUNG: Clear to auscultation bilaterally with good air entry   HEART: S1 S2  regular; no murmurs, gallops or rubs  ABDOMEN: Soft, Nontender, Nondistended; Bowel sounds present  EXTREMITIES: no cyanosis; no edema; no calf tenderness  SKIN: warm and dry; no rash  NERVOUS SYSTEM:  Awake and alert; Oriented  to place, person and time ; no new deficits    _________________________________________________  LABS:                        10.9   7.81  )-----------( 221      ( 17 Nov 2019 07:02 )             33.6     11-17    136  |  104  |  14  ----------------------------<  311<H>  3.7   |  25  |  0.81    Ca    8.4      17 Nov 2019 07:02    TPro  5.4<L>  /  Alb  2.2<L>  /  TBili  0.3  /  DBili  x   /  AST  11  /  ALT  11  /  AlkPhos  83  11-17        CAPILLARY BLOOD GLUCOSE      POCT Blood Glucose.: 392 mg/dL (17 Nov 2019 17:44)  POCT Blood Glucose.: 415 mg/dL (17 Nov 2019 16:31)  POCT Blood Glucose.: 445 mg/dL (17 Nov 2019 16:29)  POCT Blood Glucose.: 283 mg/dL (17 Nov 2019 11:13)  POCT Blood Glucose.: 292 mg/dL (17 Nov 2019 07:52)  POCT Blood Glucose.: 277 mg/dL (16 Nov 2019 20:48)        RADIOLOGY & ADDITIONAL TESTS:    Imaging Personally Reviewed:  YES/NO    Consultant(s) Notes Reviewed:   YES/ No    Care Discussed with Consultants :     Plan of care was discussed with patient and /or primary care giver; all questions and concerns were addressed and care was aligned with patient's wishes. Patient is a 74y old  Female who presents with a chief complaint of High blood glucose and agitation (17 Nov 2019 13:57)      INTERVAL HPI/OVERNIGHT EVENTS: no new complaints. Pt now more behaviorally controlled  Family at bedside. Daughter states it will be quite difficult for the patient to receive 4 insulin injections per day due to pt compliance issues    MEDICATIONS  (STANDING):  aspirin  chewable 81 milliGRAM(s) Oral daily  atorvastatin 40 milliGRAM(s) Oral at bedtime  buPROPion . 75 milliGRAM(s) Oral two times a day  heparin  Injectable 5000 Unit(s) SubCutaneous every 12 hours  insulin glargine Injectable (LANTUS) 15 Unit(s) SubCutaneous at bedtime  insulin lispro (HumaLOG) corrective regimen sliding scale   SubCutaneous three times a day before meals  insulin lispro (HumaLOG) corrective regimen sliding scale   SubCutaneous at bedtime  insulin lispro Injectable (HumaLOG) 7 Unit(s) SubCutaneous three times a day before meals  lisinopril 5 milliGRAM(s) Oral daily  melatonin 3 milliGRAM(s) Oral <User Schedule>  memantine 5 milliGRAM(s) Oral two times a day    MEDICATIONS  (PRN):  acetaminophen   Tablet .. 650 milliGRAM(s) Oral every 6 hours PRN Moderate Pain (4 - 6)  QUEtiapine 25 milliGRAM(s) Oral at bedtime PRN severe agitation      __________________________________________________  REVIEW OF SYSTEMS:    CONSTITUTIONAL: No fever,   EYES: blind  NECK: No pain or stiffness  RESPIRATORY: No cough; No shortness of breath  CARDIOVASCULAR: No chest pain, no palpitations  GASTROINTESTINAL: No pain. No nausea or vomiting; No diarrhea   NEUROLOGICAL: + generalized weakness  MUSCULOSKELETAL: No joint pain, no muscle pain  GENITOURINARY: no dysuria, no frequency, no hesitancy  PSYCHIATRY: no depression , no anxiety  ALL OTHER  ROS negative        Vital Signs Last 24 Hrs  T(C): 36.7 (17 Nov 2019 14:34), Max: 37 (16 Nov 2019 20:35)  T(F): 98 (17 Nov 2019 14:34), Max: 98.6 (16 Nov 2019 20:35)  HR: 79 (17 Nov 2019 14:34) (79 - 80)  BP: 157/57 (17 Nov 2019 14:34) (118/44 - 157/57)  BP(mean): --  RR: 18 (17 Nov 2019 14:34) (17 - 18)  SpO2: 98% (17 Nov 2019 14:34) (98% - 98%)    ________________________________________________  PHYSICAL EXAM:  GENERAL: NAD  HEENT: Normocephalic;  conjunctivae and sclerae clear; moist mucous membranes;   NECK : supple  CHEST/LUNG: Clear to auscultation bilaterally with good air entry   HEART: S1 S2  regular; no murmurs, gallops or rubs  ABDOMEN: Soft, Nontender, Nondistended; Bowel sounds present  EXTREMITIES: no cyanosis; no edema; no calf tenderness  SKIN: warm and dry; no rash  NERVOUS SYSTEM:  Awake and alert; Oriented  to place, person and time ; no new deficits    _________________________________________________  LABS:                        10.9   7.81  )-----------( 221      ( 17 Nov 2019 07:02 )             33.6     11-17    136  |  104  |  14  ----------------------------<  311<H>  3.7   |  25  |  0.81    Ca    8.4      17 Nov 2019 07:02    TPro  5.4<L>  /  Alb  2.2<L>  /  TBili  0.3  /  DBili  x   /  AST  11  /  ALT  11  /  AlkPhos  83  11-17        CAPILLARY BLOOD GLUCOSE      POCT Blood Glucose.: 392 mg/dL (17 Nov 2019 17:44)  POCT Blood Glucose.: 415 mg/dL (17 Nov 2019 16:31)  POCT Blood Glucose.: 445 mg/dL (17 Nov 2019 16:29)  POCT Blood Glucose.: 283 mg/dL (17 Nov 2019 11:13)  POCT Blood Glucose.: 292 mg/dL (17 Nov 2019 07:52)  POCT Blood Glucose.: 277 mg/dL (16 Nov 2019 20:48)        RADIOLOGY & ADDITIONAL TESTS:    Imaging Personally Reviewed:  YES/NO    Consultant(s) Notes Reviewed:   YES/ No    Care Discussed with Consultants :     Plan of care was discussed with patient and /or primary care giver; all questions and concerns were addressed and care was aligned with patient's wishes.

## 2019-11-17 NOTE — PROGRESS NOTE ADULT - PROBLEM SELECTOR PLAN 4
-c/w Wellbutrin 75mg BID   -Patient's sleep wake cycle needs to be restored  -Seroquel  25mg at bed time prn   -c/w melatonin 7 pm and wellbutrin 75mg bid  -She took memantin and lisinopril last night  -will see if she takes rest of her meds today  -Spoke with pharmacy. daytran needs approval and needs to be ordered by pharmacy. follow up with Pharmacy on Monday and see if meds have been ordered.  Psych. Dr. Dubois consult appreciated   -SW/ CM following  for planning for post-hospital care. possible long term/increase hours if family agrees  -DNR/DNI -c/w Wellbutrin 75mg BID   -Patient's sleep wake cycle needs to be restored  -Seroquel  25mg at bed time prn   -pt tolerating regimen well  -c/w melatonin 7 pm and wellbutrin 75mg bid  -She took memantin and lisinopril last night  -will see if she takes rest of her meds today  -Spoke with pharmacy. daytran needs approval and needs to be ordered by pharmacy. follow up with Pharmacy on Monday and see if meds have been ordered.  Psych. Dr. Dubois consult appreciated   -SW/ CM following  for planning for post-hospital care. possible long term/increase hours if family agrees  -DNR/DNI

## 2019-11-18 VITALS
HEART RATE: 94 BPM | SYSTOLIC BLOOD PRESSURE: 178 MMHG | OXYGEN SATURATION: 99 % | DIASTOLIC BLOOD PRESSURE: 66 MMHG | RESPIRATION RATE: 18 BRPM | TEMPERATURE: 97 F

## 2019-11-18 DIAGNOSIS — E11.65 TYPE 2 DIABETES MELLITUS WITH HYPERGLYCEMIA: ICD-10-CM

## 2019-11-18 LAB
GLUCOSE BLDC GLUCOMTR-MCNC: 136 MG/DL — HIGH (ref 70–99)
GLUCOSE BLDC GLUCOMTR-MCNC: 153 MG/DL — HIGH (ref 70–99)
GLUCOSE BLDC GLUCOMTR-MCNC: 366 MG/DL — HIGH (ref 70–99)

## 2019-11-18 PROCEDURE — 83735 ASSAY OF MAGNESIUM: CPT

## 2019-11-18 PROCEDURE — 80053 COMPREHEN METABOLIC PANEL: CPT

## 2019-11-18 PROCEDURE — 82962 GLUCOSE BLOOD TEST: CPT

## 2019-11-18 PROCEDURE — 84100 ASSAY OF PHOSPHORUS: CPT

## 2019-11-18 PROCEDURE — 96372 THER/PROPH/DIAG INJ SC/IM: CPT

## 2019-11-18 PROCEDURE — 36415 COLL VENOUS BLD VENIPUNCTURE: CPT

## 2019-11-18 PROCEDURE — 99233 SBSQ HOSP IP/OBS HIGH 50: CPT

## 2019-11-18 PROCEDURE — 93005 ELECTROCARDIOGRAM TRACING: CPT

## 2019-11-18 PROCEDURE — 85610 PROTHROMBIN TIME: CPT

## 2019-11-18 PROCEDURE — 99238 HOSP IP/OBS DSCHRG MGMT 30/<: CPT

## 2019-11-18 PROCEDURE — 99285 EMERGENCY DEPT VISIT HI MDM: CPT | Mod: 25

## 2019-11-18 PROCEDURE — 80048 BASIC METABOLIC PNL TOTAL CA: CPT

## 2019-11-18 PROCEDURE — 84484 ASSAY OF TROPONIN QUANT: CPT

## 2019-11-18 PROCEDURE — 80061 LIPID PANEL: CPT

## 2019-11-18 PROCEDURE — 83036 HEMOGLOBIN GLYCOSYLATED A1C: CPT

## 2019-11-18 PROCEDURE — 99232 SBSQ HOSP IP/OBS MODERATE 35: CPT

## 2019-11-18 PROCEDURE — 82009 KETONE BODYS QUAL: CPT

## 2019-11-18 PROCEDURE — 71045 X-RAY EXAM CHEST 1 VIEW: CPT

## 2019-11-18 PROCEDURE — 85027 COMPLETE CBC AUTOMATED: CPT

## 2019-11-18 PROCEDURE — 85730 THROMBOPLASTIN TIME PARTIAL: CPT

## 2019-11-18 PROCEDURE — 84443 ASSAY THYROID STIM HORMONE: CPT

## 2019-11-18 PROCEDURE — 82607 VITAMIN B-12: CPT

## 2019-11-18 PROCEDURE — 81001 URINALYSIS AUTO W/SCOPE: CPT

## 2019-11-18 RX ORDER — BUPROPION HYDROCHLORIDE 150 MG/1
2 TABLET, EXTENDED RELEASE ORAL
Qty: 60 | Refills: 0
Start: 2019-11-18 | End: 2019-12-17

## 2019-11-18 RX ORDER — ISOPROPYL ALCOHOL, BENZOCAINE .7; .06 ML/ML; ML/ML
1 SWAB TOPICAL
Qty: 200 | Refills: 1
Start: 2019-11-18 | End: 2020-03-16

## 2019-11-18 RX ORDER — ENOXAPARIN SODIUM 100 MG/ML
15 INJECTION SUBCUTANEOUS
Qty: 1 | Refills: 0
Start: 2019-11-18

## 2019-11-18 RX ORDER — INSULIN LISPRO 100/ML
7 VIAL (ML) SUBCUTANEOUS
Qty: 1 | Refills: 0
Start: 2019-11-18 | End: 2020-01-16

## 2019-11-18 RX ORDER — ASPIRIN/CALCIUM CARB/MAGNESIUM 324 MG
1 TABLET ORAL
Qty: 30 | Refills: 0
Start: 2019-11-18 | End: 2019-12-17

## 2019-11-18 RX ORDER — LANOLIN ALCOHOL/MO/W.PET/CERES
3 CREAM (GRAM) TOPICAL
Qty: 200 | Refills: 0
Start: 2019-11-18 | End: 2019-12-27

## 2019-11-18 RX ORDER — ATORVASTATIN CALCIUM 80 MG/1
1 TABLET, FILM COATED ORAL
Qty: 30 | Refills: 0
Start: 2019-11-18 | End: 2019-12-17

## 2019-11-18 RX ORDER — LISINOPRIL 2.5 MG/1
1 TABLET ORAL
Qty: 30 | Refills: 0
Start: 2019-11-18 | End: 2019-12-17

## 2019-11-18 RX ORDER — MEMANTINE HYDROCHLORIDE 10 MG/1
1 TABLET ORAL
Qty: 60 | Refills: 0
Start: 2019-11-18 | End: 2019-12-17

## 2019-11-18 RX ORDER — DOXEPIN HCL 100 MG
1 CAPSULE ORAL
Qty: 0 | Refills: 0 | DISCHARGE

## 2019-11-18 RX ADMIN — MEMANTINE HYDROCHLORIDE 5 MILLIGRAM(S): 10 TABLET ORAL at 06:31

## 2019-11-18 RX ADMIN — BUPROPION HYDROCHLORIDE 75 MILLIGRAM(S): 150 TABLET, EXTENDED RELEASE ORAL at 06:31

## 2019-11-18 RX ADMIN — HEPARIN SODIUM 5000 UNIT(S): 5000 INJECTION INTRAVENOUS; SUBCUTANEOUS at 06:31

## 2019-11-18 RX ADMIN — LISINOPRIL 5 MILLIGRAM(S): 2.5 TABLET ORAL at 06:31

## 2019-11-18 RX ADMIN — Medication 81 MILLIGRAM(S): at 12:05

## 2019-11-18 NOTE — PROGRESS NOTE BEHAVIORAL HEALTH - PRIMARY DX
Circadian rhythm sleep disorder, non-entrained type
Circadian rhythm sleep disorder, non-entrained type

## 2019-11-18 NOTE — PROGRESS NOTE BEHAVIORAL HEALTH - NSBHFUPINTERVALHXFT_PSY_A_CORE
Per chart and staff reports, pt started taking her prescribed medications (Wellbutrin and melatonin) over the weekend with good effect. Pt was seen in her room for f/u, and was encountered sitting on bed awake for the first time. Pt confirmed that she has near-total vision loss from diabetic retinopathy ("I can't see nothing"), which she says is very disorienting as well as discouraging. Writer tried to explain that pt has disturbance in her wake-sleep patterns due to vision loss, and the medications recommended by psychiatry are intended to help restore a more normal wake-sleep schedule. Writer encouraged pt to take her medications and work with PT to help increase her level of physical activity. Pt said, "It's really hard" but agreed to make an effort. Pt described mood as "better" and denied SI/HI/AVH.

## 2019-11-18 NOTE — DISCHARGE NOTE NURSING/CASE MANAGEMENT/SOCIAL WORK - PATIENT PORTAL LINK FT
You can access the FollowMyHealth Patient Portal offered by [facility] by registering at the following website: [email or web address]. By joining [facility]’s FollowMyHealth portal, you will also be able to view your health information using other applications (apps) compatible with our system.

## 2019-11-18 NOTE — PROGRESS NOTE BEHAVIORAL HEALTH - RISK ASSESSMENT
Risk factors: Older age, cognitive impairment. Protective factors: Stable domicile with supportive family, absent h/o SA/SI/self-injury. Risk level appears low at the time of assessment.
Risk factors: Older age, cognitive impairment. Protective factors: Stable domicile with supportive family, absent h/o SA/SI/self-injury. Risk level appears low at the time of assessment.

## 2019-11-18 NOTE — DISCHARGE NOTE PROVIDER - NSDCMRMEDTOKEN_GEN_ALL_CORE_FT
memantine 5 mg oral tablet: 1 tab(s) orally 2 times a day  QUEtiapine: 5 milligram(s) orally once a day  Silenor 3 mg oral tablet: 1 tab(s) orally once a day (at bedtime) alcohol swabs : Apply topically to affected area 4 times a day   aspirin 81 mg oral tablet, chewable: 1 tab(s) orally once a day  atorvastatin 40 mg oral tablet: 1 tab(s) orally once a day (at bedtime)  buPROPion 75 mg oral tablet: 2 tab(s) orally once a day in the [facility]   glucose tablets: Follow instructions on bottle when sugar is low.  HumaLOG KwikPen 100 units/mL injectable solution: 7 unit(s) subcutaneously 3 times a day (before meals)   Insulin Pen Needles, 4mm: 1 application subcutaneously 4 times a day. ** Use with insulin pen **   lancets: 1 application subcutaneously 4 times a day   Lantus Solostar Pen 100 units/mL subcutaneous solution: 15 unit(s) subcutaneous once a day (at bedtime)   lisinopril 5 mg oral tablet: 1 tab(s) orally once a day  Melatonin 1 mg/mL oral solution: 3 milliliter(s) orally once a day (at bedtime)   memantine 5 mg oral tablet: 1 tab(s) orally 2 times a day  test strips (per patient&#x27;s insurance): 1 application subcutaneously 4 times a day. ** Compatible with patient&#x27;s glucometer ** alcohol swabs : Apply topically to affected area 4 times a day   aspirin 81 mg oral tablet, chewable: 1 tab(s) orally once a day  atorvastatin 40 mg oral tablet: 1 tab(s) orally once a day (at bedtime)  buPROPion 75 mg oral tablet: 2 tab(s) orally once a day in the [facility]   glucose tablets: Follow instructions on bottle when sugar is low.  HumaLOG KwikPen 100 units/mL injectable solution: 7 unit(s) subcutaneously 3 times a day (before meals)   Insulin Pen Needles, 4mm: 1 application subcutaneously 4 times a day. ** Use with insulin pen **   lancets: 1 application subcutaneously 4 times a day   Lantus Solostar Pen 100 units/mL subcutaneous solution: 15 unit(s) subcutaneous once a day (at bedtime)   lisinopril 5 mg oral tablet: 1 tab(s) orally once a day  Melatonin 1 mg/mL oral solution: 3 milliliter(s) orally once a day (at bedtime)   memantine 5 mg oral tablet: 1 tab(s) orally 2 times a day  test strips (per patient&#x27;s insurance): 1 application subcutaneously 4 times a day. ** Compatible with patient&#x27;s glucometer ** alcohol swabs : Apply topically to affected area 4 times a day   aspirin 81 mg oral tablet, chewable: 1 tab(s) orally once a day  atorvastatin 40 mg oral tablet: 1 tab(s) orally once a day (at bedtime)  buPROPion 75 mg oral tablet: 2 tab(s) orally once a day in the [facility]   glucose tablets: Follow instructions on bottle when sugar is low.  HumaLOG KwikPen 100 units/mL injectable solution: 7 unit(s) subcutaneously 3 times a day (before meals)   Insulin Pen Needles, 4mm: 1 application subcutaneously 4 times a day. ** Use with insulin pen **   lancets: 1 application subcutaneously 4 times a day   Lantus Solostar Pen 100 units/mL subcutaneous solution: 15 unit(s) subcutaneous once a day (at bedtime)   lisinopril 5 mg oral tablet: 1 tab(s) orally once a day  Melatonin 1 mg/mL oral solution: 3 milliliter(s) orally once a day (at bedtime)   memantine 5 mg oral tablet: 1 tab(s) orally 2 times a day  test strips (per patient&#x27;s insurance): 1 application subcutaneously 4 times a day. ** Compatible with patient&#x27;s glucometer **

## 2019-11-18 NOTE — PROGRESS NOTE ADULT - ATTENDING COMMENTS
Patient was examined and discussed with resident.  Son is at the bedside.  She is more alert today, not agitated.   Vital Signs Last 24 Hrs  T(C): 36.2 (18 Nov 2019 17:16), Max: 37 (18 Nov 2019 05:35)  T(F): 97.2 (18 Nov 2019 17:16), Max: 98.6 (18 Nov 2019 05:35)  HR: 94 (18 Nov 2019 17:16) (70 - 94)  BP: 178/66 (18 Nov 2019 17:16) (96/55 - 188/93)  BP(mean): --  RR: 18 (18 Nov 2019 17:16) (16 - 18)  SpO2: 99% (18 Nov 2019 17:16) (95% - 99%)                          10.9   7.81  )-----------( 221      ( 17 Nov 2019 07:02 )             33.6   11-17    136  |  104  |  14  ----------------------------<  311<H>  3.7   |  25  |  0.81    Ca    8.4      17 Nov 2019 07:02    TPro  5.4<L>  /  Alb  2.2<L>  /  TBili  0.3  /  DBili  x   /  AST  11  /  ALT  11  /  AlkPhos  83  11-17  POCT Blood Glucose.: 136 mg/dL (11.18.19 @ 11:32)  POCT Blood Glucose.: 366 mg/dL (11.18.19 @ 16:41)    IMP:  demetntia with agitation, abnormal sleep wake cycle, improved.           DM, suboptimal, but improved, control  Plan: Discharge  home with family          f/u Endocrinology and PMD.

## 2019-11-18 NOTE — CHART NOTE - NSCHARTNOTEFT_GEN_A_CORE
Reassessment:   74yFemalePatient is a 74y old  Female who presents with a chief complaint of High blood glucose and agitation (2019 12:42)      Factors impacting intake: [ ] none [ ] nausea  [ ] vomiting [ ] diarrhea [ ] constipation  [ ]chewing problems [ ] swallowing issues  [X ] other:     Diet Presciption: Diet, Mechanical Soft:   Consistent Carbohydrate {No Snacks}  DASH/TLC {Sodium & Cholesterol Restricted} (19 @ 10:17)    Intake: Visited pt. alert but confused, d/w PCA, pt. with good po intake, consuming >75% of meals & tolerating, pt. requesting additional breads/crackers with meals noted, no reports of GI distress, awaiting placement, rec. c/w with Mechanical Soft: Consistent Carbohydrate {No Snacks}  DASH/TLC {Sodium & Cholesterol Restricted} & add MVI/minerals as medically feasible. RD available.     Daily Weight in k.8 (2019 13:18)  Weight in k.8 (2019 13:18)    % Weight Change: stable     Pertinent Medications: MEDICATIONS  (STANDING):  aspirin  chewable 81 milliGRAM(s) Oral daily  atorvastatin 40 milliGRAM(s) Oral at bedtime  buPROPion . 75 milliGRAM(s) Oral two times a day  heparin  Injectable 5000 Unit(s) SubCutaneous every 12 hours  insulin glargine Injectable (LANTUS) 15 Unit(s) SubCutaneous at bedtime  insulin lispro (HumaLOG) corrective regimen sliding scale   SubCutaneous three times a day before meals  insulin lispro (HumaLOG) corrective regimen sliding scale   SubCutaneous at bedtime  insulin lispro Injectable (HumaLOG) 7 Unit(s) SubCutaneous three times a day before meals  lisinopril 5 milliGRAM(s) Oral daily  melatonin 3 milliGRAM(s) Oral <User Schedule>  memantine 5 milliGRAM(s) Oral two times a day    MEDICATIONS  (PRN):  acetaminophen   Tablet .. 650 milliGRAM(s) Oral every 6 hours PRN Moderate Pain (4 - 6)  QUEtiapine 25 milliGRAM(s) Oral at bedtime PRN severe agitation    Pertinent Labs:  Na136 mmol/L Glu 311 mg/dL<H> K+ 3.7 mmol/L Cr  0.81 mg/dL BUN 14 mg/dL  Phos 2.5 mg/dL  Alb 2.2 g/dL<L> 14 [id number] >15.5 %<H> 11-14 Chol 194 mg/dL  mg/dL HDL 52 mg/dL Trig 182 mg/dL<H>     CAPILLARY BLOOD GLUCOSE      POCT Blood Glucose.: 136 mg/dL (2019 11:32)  POCT Blood Glucose.: 153 mg/dL (2019 08:25)  POCT Blood Glucose.: 197 mg/dL (2019 21:25)  POCT Blood Glucose.: 392 mg/dL (2019 17:44)  POCT Blood Glucose.: 415 mg/dL (2019 16:31)  POCT Blood Glucose.: 445 mg/dL (2019 16:29)    Skin: intact     Estimated Needs:   [ ] no change since previous assessment  [ ] recalculated:     Previous Nutrition Diagnosis:   [ ] Inadequate Energy Intake [ ]Inadequate Oral Intake [ ] Excessive Energy Intake   [ ] Underweight [ ] Increased Nutrient Needs [ ] Overweight/Obesity   [ ] Altered GI Function [ ] Unintended Weight Loss [ ] Food & Nutrition Related Knowledge Deficit [ Severe] Malnutrition     Nutrition Diagnosis is [X ] ongoing  [ ] resolved [ ] not applicable     New Nutrition Diagnosis: [ ] not applicable       Interventions: To meet nutrition needs   Recommend  [ ] Change Diet To:  [ ] Nutrition Supplement  [ ] Nutrition Support  [ ] Other:     Monitoring and Evaluation:   [ X] PO intake [ x ] Tolerance to diet prescription [ x ] weights [ x ] labs[ x ] follow up per protocol  [ ] other:

## 2019-11-18 NOTE — PROGRESS NOTE ADULT - PROBLEM SELECTOR PLAN 4
-c/w Wellbutrin 75mg AM  -Patient's sleep wake cycle needs to be restored  -Seroquel  25mg at bed time prn   -pt tolerating regimen well  -c/w melatonin 7 pm and wellbutrin 75mg bid  -She took memantin and lisinopril last night  -will see if she takes rest of her meds today  Psych. Dr. Dubois consult appreciated   -DNR/DNI

## 2019-11-18 NOTE — PROGRESS NOTE BEHAVIORAL HEALTH - SUMMARY
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 Psych consult was requested on 11/13 for assistance in managing agitation. Pt was initially unable to participate in meaningful interview due to extreme somnolence, but per collateral obtained from daughter as well as recent chart note from [facility], pt has had severe circadian rhythm disturbance for a prolonged period of time, with vision loss 2/2 diabetes as well as vascular dementia as likely precipitating factors. Pt appears likely to benefit from continuing efforts to re-establish a more normal circadian rhythm with the help of medications (melatonin 3 mg q7pm, Wellbutrin 150 mg qam) as well as consistent daily routines. As pt is now accepting PO psychotropic medications with good effect, we no longer recommend switching daytime medication from Wellbutrin to Daytrana patch. Use of potentially sedating medications such as Seroquel should be avoided as much as possible in order to give pt opportunity to re-establish healthy sleep patterns. Pt now appears significantly improved in affect and behavior, but we remain concerned that agitation could recur at home if pt refuses recommended insulin regimen.
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 Psych consult was requested on 11/13 for assistance in managing agitation. Pt is unable to participate in meaningful interview due to extreme somnolence, but per collateral obtained from daughter as well as recent chart note from [facility], pt has had severe circadian rhythm disturbance for a prolonged period of time, with vision loss 2/2 diabetes as well as vascular dementia as likely precipitating factors. Pt appears likely to benefit from efforts to re-establish a more normal circadian rhythm with the help of medications (melatonin 3 mg q7pm, Wellbutrin 75 mg qam) as well as consistent daily routines, but she is currently refusing all prescribed medications. We now recommend switching daytime medication from Wellbutrin to Daytrana 10 mg patch to help with daytime alertness, while changing dosage form of melatonin from tablets to liquid in effort to get pt to take it. Use of potentially sedating medications such as Seroquel should be avoided as much as possible in order to give pt opportunity to re-establish healthy sleep patterns. Given pt's profoundly disturbed circadian rhythms and lack of cooperation with her own care, pt is likely to require placement in a supervised dementia unit for safety.

## 2019-11-18 NOTE — PROGRESS NOTE ADULT - SUBJECTIVE AND OBJECTIVE BOX
MEDICAL ATTENDING NOTE  Patient is a 74y old  Female who presents with a chief complaint of High blood glucose and agitation (18 Nov 2019 12:42)      HPI:  74F from home, lives with daughter and , PMHx of dementia, CAD s/p 3 stent, HTN, DM, legally blind was brought in by family due to severe agitation and high blood glucose. On my eval, pt. was AAO X2, states her daughter did not give her any food when she was hungry and send her to hospital. Spoke to daughter, Dora Graf over phone. She states that pt. had last stent placed 4 yrs ago and since then, she has dementia, which is getting worst, she is mostly agitated, does not sleep well whole night and does not let other people sleep too. She refuses to take any meds, however family crushes seroquel and memantine and gives it with juice. Her blood glucose has been always uncontrolled and she refuses to take insulin as prescribed. Denies any fever, N/V, urinary or bowel complaint, cough, pain anywhere. (13 Nov 2019 07:27)      INTERVAL HPI/OVERNIGHT EVENTS: Patient is more alert today.     MEDICATIONS  (STANDING):  aspirin  chewable 81 milliGRAM(s) Oral daily  atorvastatin 40 milliGRAM(s) Oral at bedtime  buPROPion . 75 milliGRAM(s) Oral two times a day  heparin  Injectable 5000 Unit(s) SubCutaneous every 12 hours  insulin glargine Injectable (LANTUS) 15 Unit(s) SubCutaneous at bedtime  insulin lispro (HumaLOG) corrective regimen sliding scale   SubCutaneous three times a day before meals  insulin lispro (HumaLOG) corrective regimen sliding scale   SubCutaneous at bedtime  insulin lispro Injectable (HumaLOG) 7 Unit(s) SubCutaneous three times a day before meals  lisinopril 5 milliGRAM(s) Oral daily  melatonin 3 milliGRAM(s) Oral <User Schedule>  memantine 5 milliGRAM(s) Oral two times a day    MEDICATIONS  (PRN):  acetaminophen   Tablet .. 650 milliGRAM(s) Oral every 6 hours PRN Moderate Pain (4 - 6)  QUEtiapine 25 milliGRAM(s) Oral at bedtime PRN severe agitation      __________________________________________________  REVIEW OF SYSTEMS:    Cannot elicit.        Vital Signs Last 24 Hrs  T(C): 36.7 (18 Nov 2019 14:17), Max: 37 (18 Nov 2019 05:35)  T(F): 98 (18 Nov 2019 14:17), Max: 98.6 (18 Nov 2019 05:35)  HR: 87 (18 Nov 2019 14:30) (70 - 91)  BP: 139/45 (18 Nov 2019 14:30) (96/55 - 188/93)  BP(mean): --  RR: 16 (18 Nov 2019 14:17) (16 - 16)  SpO2: 99% (18 Nov 2019 14:17) (95% - 99%)    ________________________________________________  PHYSICAL EXAM:  GENERAL: NAD  HEENT: Normocephalic;  conjunctivae and sclerae clear; moist mucous membranes;   NECK : supple  CHEST/LUNG: Clear to auscultation bilaterally with good air entry   HEART: S1 S2  regular; no murmurs, gallops or rubs  ABDOMEN: Soft, Nontender, Nondistended; Bowel sounds present  EXTREMITIES: no cyanosis; no edema; no calf tenderness  SKIN: warm and dry; no rash  NERVOUS SYSTEM:  Awake and alert; Oriented  to place, person and time ; no new deficits    _________________________________________________  LABS:                        10.9   7.81  )-----------( 221      ( 17 Nov 2019 07:02 )             33.6     11-17    136  |  104  |  14  ----------------------------<  311<H>  3.7   |  25  |  0.81    Ca    8.4      17 Nov 2019 07:02    TPro  5.4<L>  /  Alb  2.2<L>  /  TBili  0.3  /  DBili  x   /  AST  11  /  ALT  11  /  AlkPhos  83  11-17        CAPILLARY BLOOD GLUCOSE      POCT Blood Glucose.: 366 mg/dL (18 Nov 2019 16:41)  POCT Blood Glucose.: 136 mg/dL (18 Nov 2019 11:32)  POCT Blood Glucose.: 153 mg/dL (18 Nov 2019 08:25)  POCT Blood Glucose.: 197 mg/dL (17 Nov 2019 21:25)  POCT Blood Glucose.: 392 mg/dL (17 Nov 2019 17:44)

## 2019-11-18 NOTE — PROGRESS NOTE BEHAVIORAL HEALTH - DETAILS
Severe vision loss attributed to diabetic retinopathy Circadian rhythm disturbance DM with frequent hyperglycemia

## 2019-11-18 NOTE — DISCHARGE NOTE PROVIDER - HOSPITAL COURSE
Patient is a 74y old  Female who presents with a chief complaint of High blood glucose and agitation (17 Nov 2019 18:59)        74F from home, lives with daughter and , PMHx of dementia, CAD s/p 3 stent, HTN, DM, legally blind was brought in by family due to severe agitation and high blood glucose. On my eval, pt. was AAO X2, states her daughter did not give her any food when she was hungry and send her to hospital. Spoke to daughter, Dora Graf over phone. She states that pt. had last stent placed 4 yrs ago and since then, she has dementia, which is getting worst, she is mostly agitated, does not sleep well whole night and does not let other people sleep too. She refuses to take any meds, however family crushes seroquel and memantine and gives it with juice. Her blood glucose has been always uncontrolled and she refuses to take insulin as prescribed. Denies any fever, N/V, urinary or bowel complaint, cough, pain anywhere. (13 Nov 2019 07:27)        Patient was admitted with Hyperglycemia due to non-compliance with medications and diet, patient A1c 15%. she was evaluated by Endocrinology. Currently patient is dbeen treated with basal/bolus insulin regime. patient blood sugars wnl, patient condition was discussed with daughter regarding medication. dietary compliance. Daughter will admisnister insulin at home and regular check her blood sugars before meals. Patient daughter is aware of risks for hypoglycemia.     Patient also presented with agitation likely dementia with behavioral disorder. Patient was evaluated by psychiarty and recommended readjustedment in her medication     continue with Wellbutrin 150 mg in morning and melatonin at bed time. for Dementia continue with memantine.         Patient currently HD stable for discharge, patient daughter wants to take her home

## 2019-11-18 NOTE — PROGRESS NOTE BEHAVIORAL HEALTH - NSBHCONSULTRECOMMENDOTHER_PSY_A_CORE FT
1. Recommend enhanced supervision for safety i/s/o dementia, delirium and vision loss  2. Recommend increasing dose of Wellbutrin from 75 mg qam to 150 mg qam  --No longer recommending methylphenidate 10 mg transdermal patch, given that pt is accepting Wellbutrin PO with good effect  3. Recommend c/w melatonin liquid, 3 mg q7pm standing to help with circadian rhythm disorder  --Please give at EXACT SAME TIME every night to maximize potential efficacy  4. C/w home memantine 5 mg q12h, but do NOT recommend restarting home doxepin 3 mg qhs which was likely being used for sleep  5. Recommend DC Rx for Wellbutrin 150 mg qam, melatonin liquid 3 mg q7pm and memantine 5 mg q12h  --Recommend pt's medications be continued by [facility] housecall service after DC  6. Per unit REUBEN Zurita, pt's family intends to take her home today with increased HHA hours  7. Psychiatry is signing off in anticipation of DC  8. Case d/w Dr. Neri of primary team    Ada Dubois MD  Director, Consultation-Liaison Psychiatry Service  [id number]
1. Recommend enhanced supervision for safety i/s/o dementia, delirium and vision loss  2. Recommend changing daytime medication from Wellbutrin 75 mg qam to methylphenidate 10 mg transdermal patch to improve daytime alertness  3. Recommend changing dose form of melatonin to liquid, 3 mg q7pm standing to help with circadian rhythm disorder  --Please give at EXACT SAME TIME every night to maximize potential efficacy  4. C/w home memantine 5 mg q12h, but do NOT recommend restarting home doxepin 3 mg qhs which was likely being used for sleep  5. For moderate acute agitation, recommend Seroquel 25 mg q24h PRN  --Recommend using only when agitation is at least moderate, given potential to interfere with efficacy of melatonin  6. Psychiatry will continue to follow  7. Request CM/SW assistance in identifying suitable DC plan for pt  8. Case d/w Dr. Becker of primary team    Ada Dubois MD  Director, Consultation-Liaison Psychiatry Service  [id number]

## 2019-11-18 NOTE — DISCHARGE NOTE PROVIDER - CARE PROVIDER_API CALL
Claudine Ronquillo (MD)  Internal Medicine  [address], [address] [place] [address]  Phone: [phone]  Follow Up Time:     Ada Dubois)  Psychiatry  [address] [address] [place], [place] [address]  Phone: [phone]  Follow Up Time:

## 2019-11-18 NOTE — PROGRESS NOTE BEHAVIORAL HEALTH - NSBHCHARTREVIEWVS_PSY_A_CORE FT
Vital Signs Last 24 Hrs  T(C): 36.8 (15 Nov 2019 05:19), Max: 36.8 (15 Nov 2019 05:19)  T(F): 98.2 (15 Nov 2019 05:19), Max: 98.2 (15 Nov 2019 05:19)  HR: 66 (15 Nov 2019 05:19) (66 - 81)  BP: 110/45 (15 Nov 2019 05:19) (110/45 - 154/75)  BP(mean): --  RR: 17 (15 Nov 2019 05:19) (16 - 17)  SpO2: 96% (15 Nov 2019 05:19) (96% - 98%)
Vital Signs Last 24 Hrs  T(C): 36.7 (18 Nov 2019 14:17), Max: 37 (18 Nov 2019 05:35)  T(F): 98 (18 Nov 2019 14:17), Max: 98.6 (18 Nov 2019 05:35)  HR: 87 (18 Nov 2019 14:30) (70 - 91)  BP: 139/45 (18 Nov 2019 14:30) (96/55 - 188/93)  BP(mean): --  RR: 16 (18 Nov 2019 14:17) (16 - 16)  SpO2: 99% (18 Nov 2019 14:17) (95% - 99%)

## 2019-11-18 NOTE — PROGRESS NOTE ADULT - SUBJECTIVE AND OBJECTIVE BOX
74 year old female with PMH of dementia, CAD s/p 3 stent, HTN, type 2 DM, and legally blind was brought in by family due to severe agitation and high blood glucose. Endocrinology was consulted for management of uncontrolled type 2 DM, A1c >15.5%.     Patient seen and examined at bedside. She reports feeling well overall with good appetite. She is ready for discharge home today as per primary team. FS reviewed. BG overall well controlled <180 on current regimen of Lantus 15 units at bedtime and Humalog 7 units TID with meals.     MEDICATIONS  (STANDING):  aspirin  chewable 81 milliGRAM(s) Oral daily  atorvastatin 40 milliGRAM(s) Oral at bedtime  buPROPion . 75 milliGRAM(s) Oral two times a day  heparin  Injectable 5000 Unit(s) SubCutaneous every 12 hours  insulin glargine Injectable (LANTUS) 15 Unit(s) SubCutaneous at bedtime  insulin lispro (HumaLOG) corrective regimen sliding scale   SubCutaneous three times a day before meals  insulin lispro (HumaLOG) corrective regimen sliding scale   SubCutaneous at bedtime  insulin lispro Injectable (HumaLOG) 7 Unit(s) SubCutaneous three times a day before meals  lisinopril 5 milliGRAM(s) Oral daily  melatonin 3 milliGRAM(s) Oral <User Schedule>  memantine 5 milliGRAM(s) Oral two times a day    MEDICATIONS  (PRN):  acetaminophen   Tablet .. 650 milliGRAM(s) Oral every 6 hours PRN Moderate Pain (4 - 6)  QUEtiapine 25 milliGRAM(s) Oral at bedtime PRN severe agitation      REVIEW OF SYSTEMS:  CONSTITUTIONAL: fatigue,  No fever or weight loss   EYES: patient is blind, No eye pain, visual disturbances, or discharge  ENMT:  No difficulty hearing, tinnitus, vertigo; No sinus or throat pain  NECK: No pain or stiffness  RESPIRATORY: No cough, wheezing, chills or hemoptysis; No shortness of breath  CARDIOVASCULAR: No chest pain, palpitations, dizziness, or leg swelling  GASTROINTESTINAL: No abdominal or epigastric pain. No nausea, vomiting, or hematemesis; No diarrhea or constipation. No melena or hematochezia.  GENITOURINARY: No dysuria, frequency, hematuria, or incontinence  NEUROLOGICAL: No headaches, memory loss, loss of strength, numbness, or tremors  SKIN: No itching, burning, rashes, or lesions   LYMPH NODES: No enlarged glands  ENDOCRINE: No heat or cold intolerance; No hair loss  MUSCULOSKELETAL: No joint pain or swelling; No muscle, back, or extremity pain  PSYCHIATRIC: No depression, anxiety, mood swings, or difficulty sleeping  HEME/LYMPH: No easy bruising, or bleeding gums  ALLERGY AND IMMUNOLOGIC: No hives or eczema        PHYSICAL EXAM:    VITAL SIGNS  T(C): 37 (11-18-19 @ 05:35), Max: 37 (11-18-19 @ 05:35)  HR: 80 (11-18-19 @ 05:35) (70 - 80)  BP: 171/65 (11-18-19 @ 05:35) (96/55 - 171/65)  RR: 16 (11-18-19 @ 05:35) (16 - 18)  SpO2: 95% (11-18-19 @ 05:35) (95% - 99%)    General: lethargic. No acute distress.   Eye: Extraocular movements are intact.    HENT:  Normocephalic.    Respiratory: Respirations are non-labored, Symmetrical chest wall expansion.    Gastrointestinal:  Non-distended.    Neurologic:  Alert and oriented X 1-2, No focal defects, Cranial Nerves II-XII are grossly intact.    Psychiatric:  Cooperative, Appropriate mood & affect.  SKIN: No rashes or lesions      LABS:                        10.9   7.81  )-----------( 221      ( 17 Nov 2019 07:02 )             33.6     11-17    136  |  104  |  14  ----------------------------<  311<H>  3.7   |  25  |  0.81    Ca    8.4      17 Nov 2019 07:02    TPro  5.4<L>  /  Alb  2.2<L>  /  TBili  0.3  /  DBili  x   /  AST  11  /  ALT  11  /  AlkPhos  83  11-17        CAPILLARY BLOOD GLUCOSE      POCT Blood Glucose.: 136 mg/dL (18 Nov 2019 11:32)  POCT Blood Glucose.: 153 mg/dL (18 Nov 2019 08:25)  POCT Blood Glucose.: 197 mg/dL (17 Nov 2019 21:25)  POCT Blood Glucose.: 392 mg/dL (17 Nov 2019 17:44)  POCT Blood Glucose.: 415 mg/dL (17 Nov 2019 16:31)  POCT Blood Glucose.: 445 mg/dL (17 Nov 2019 16:29)

## 2019-11-18 NOTE — PROGRESS NOTE ADULT - ASSESSMENT
74 year old female with PMH of dementia, CAD s/p 3 stent, HTN, type 2 DM, and legally blind was brought in by family due to severe agitation and high blood glucose. Endocrinology was consulted for management of uncontrolled type 2 DM, A1c >15.5%.     1. Uncontrolled Type 2 DM, A1c >15.5%, with hyperglycemia  -secondary to medication noncompliance (worsening dementia)  -FS overall better controlled, mostly <180  -Continue Lantus 15 units at bedtime  -Continue Humalog 7 units TID with meals  -recommend to continue mealtime rapid acting insulin as below  BG 70-100mg/dL 0 units  -150 mg/dL 0 units  -200 mg/dL 1 units  -250 mg/dL 2 units  -300 mg/dL 3 units  -350 mg/dL 4 units  -400 mg/dL 5 units  BG > 400mg/dL 6 units  -FS AC HS  -ensure consistent carbohydrate diet   -will monitor FS and adjust accordingly   --if patient is NPO for any reason please change FS and sliding scale to NPO lispro scale   -Can continue above regimen on discharge if patient and family are willing to continue basal bolus regimen    2. HTN  -BP above goal  -titrate lisinopril as needed     3. Worsening Dementia  -patient with worsening dementia, refusing medications at home   -care as per psych  -safe d/c planning     Plan discussed with primary team  Please  call  w/ any questions or concerns [phone]

## 2019-11-18 NOTE — DISCHARGE NOTE PROVIDER - NSDCCPCAREPLAN_GEN_ALL_CORE_FT
PRINCIPAL DISCHARGE DIAGNOSIS  Diagnosis: Uncontrolled diabetes mellitus  Assessment and Plan of Treatment: You presented with hyperglycemia likely due to non compliance with diet and medications. Your A1c 15% continue with basal/ bolus insulin regime as prescribed. Maintain Blood Sugar 140-180 range. Check Blood suagrs 4 times a days. Avoid hypoglycemia. Follow with endocrinologist in 4 weeks/      SECONDARY DISCHARGE DIAGNOSES  Diagnosis: Vascular dementia with behavior disturbance  Assessment and Plan of Treatment: You were haivng agitation, likely due to dementia with behavioral disorder. You were evaluated by psychiatry. recommended to take wellbutrin 150 mg in morning and melatonin at bed time for sleep. follow with outpaitent psychiatry upon discahrge.

## 2019-11-18 NOTE — PROGRESS NOTE ADULT - REASON FOR ADMISSION
High blood glucose and agitation

## 2019-11-18 NOTE — PROGRESS NOTE ADULT - PROBLEM SELECTOR PLAN 1
appreciate input and discussion w/ endocrinology  pt's family reports some expected difficulties with 4 insulin injections per day  will monitor on newly increased dose of lantus 15 units tonight.   Discharge on regimen of lantus + prandin tomorrow

## 2019-11-19 RX ORDER — INSULIN GLARGINE 100 [IU]/ML
15 INJECTION, SOLUTION SUBCUTANEOUS
Qty: 1 | Refills: 0
Start: 2019-11-19 | End: 2020-01-17

## 2019-11-19 RX ORDER — INSULIN LISPRO 100/ML
7 VIAL (ML) SUBCUTANEOUS
Qty: 1 | Refills: 0
Start: 2019-11-19 | End: 2020-01-17

## 2019-11-19 RX ORDER — ISOPROPYL ALCOHOL, BENZOCAINE .7; .06 ML/ML; ML/ML
1 SWAB TOPICAL
Qty: 200 | Refills: 1
Start: 2019-11-19 | End: 2020-03-17

## 2020-01-31 ENCOUNTER — EMERGENCY (EMERGENCY)
Facility: HOSPITAL | Age: 75
LOS: 1 days | Discharge: HOME | End: 2020-01-31
Attending: STUDENT IN AN ORGANIZED HEALTH CARE EDUCATION/TRAINING PROGRAM
Payer: COMMERCIAL

## 2020-01-31 VITALS
OXYGEN SATURATION: 100 % | HEART RATE: 81 BPM | DIASTOLIC BLOOD PRESSURE: 82 MMHG | TEMPERATURE: 98 F | RESPIRATION RATE: 18 BRPM | SYSTOLIC BLOOD PRESSURE: 180 MMHG

## 2020-01-31 VITALS
SYSTOLIC BLOOD PRESSURE: 184 MMHG | DIASTOLIC BLOOD PRESSURE: 78 MMHG | TEMPERATURE: 98 F | RESPIRATION RATE: 16 BRPM | HEIGHT: 60 IN | HEART RATE: 88 BPM | OXYGEN SATURATION: 98 % | WEIGHT: 104.94 LBS

## 2020-01-31 LAB
ALBUMIN SERPL ELPH-MCNC: 3.3 G/DL — LOW (ref 3.5–5)
ALP SERPL-CCNC: 126 U/L — HIGH (ref 40–120)
ALT FLD-CCNC: 14 U/L DA — SIGNIFICANT CHANGE UP (ref 10–60)
ANION GAP SERPL CALC-SCNC: 4 MMOL/L — LOW (ref 5–17)
APTT BLD: 28.3 SEC — SIGNIFICANT CHANGE UP (ref 27.5–36.3)
AST SERPL-CCNC: 21 U/L — SIGNIFICANT CHANGE UP (ref 10–40)
BASOPHILS # BLD AUTO: 0.04 K/UL — SIGNIFICANT CHANGE UP (ref 0–0.2)
BASOPHILS NFR BLD AUTO: 0.7 % — SIGNIFICANT CHANGE UP (ref 0–2)
BILIRUB SERPL-MCNC: 0.4 MG/DL — SIGNIFICANT CHANGE UP (ref 0.2–1.2)
BUN SERPL-MCNC: 24 MG/DL — HIGH (ref 7–18)
CALCIUM SERPL-MCNC: 9.3 MG/DL — SIGNIFICANT CHANGE UP (ref 8.4–10.5)
CHLORIDE SERPL-SCNC: 98 MMOL/L — SIGNIFICANT CHANGE UP (ref 96–108)
CO2 SERPL-SCNC: 32 MMOL/L — HIGH (ref 22–31)
CREAT SERPL-MCNC: 1.18 MG/DL — SIGNIFICANT CHANGE UP (ref 0.5–1.3)
EOSINOPHIL # BLD AUTO: 0.07 K/UL — SIGNIFICANT CHANGE UP (ref 0–0.5)
EOSINOPHIL NFR BLD AUTO: 1.3 % — SIGNIFICANT CHANGE UP (ref 0–6)
GLUCOSE SERPL-MCNC: 380 MG/DL — HIGH (ref 70–99)
HCT VFR BLD CALC: 42 % — SIGNIFICANT CHANGE UP (ref 34.5–45)
HGB BLD-MCNC: 13.5 G/DL — SIGNIFICANT CHANGE UP (ref 11.5–15.5)
IMM GRANULOCYTES NFR BLD AUTO: 0.4 % — SIGNIFICANT CHANGE UP (ref 0–1.5)
INR BLD: 0.9 RATIO — SIGNIFICANT CHANGE UP (ref 0.88–1.16)
LYMPHOCYTES # BLD AUTO: 1.73 K/UL — SIGNIFICANT CHANGE UP (ref 1–3.3)
LYMPHOCYTES # BLD AUTO: 32.4 % — SIGNIFICANT CHANGE UP (ref 13–44)
MAGNESIUM SERPL-MCNC: 2.2 MG/DL — SIGNIFICANT CHANGE UP (ref 1.6–2.6)
MCHC RBC-ENTMCNC: 28.6 PG — SIGNIFICANT CHANGE UP (ref 27–34)
MCHC RBC-ENTMCNC: 32.1 GM/DL — SIGNIFICANT CHANGE UP (ref 32–36)
MCV RBC AUTO: 89 FL — SIGNIFICANT CHANGE UP (ref 80–100)
MONOCYTES # BLD AUTO: 0.51 K/UL — SIGNIFICANT CHANGE UP (ref 0–0.9)
MONOCYTES NFR BLD AUTO: 9.6 % — SIGNIFICANT CHANGE UP (ref 2–14)
NEUTROPHILS # BLD AUTO: 2.97 K/UL — SIGNIFICANT CHANGE UP (ref 1.8–7.4)
NEUTROPHILS NFR BLD AUTO: 55.6 % — SIGNIFICANT CHANGE UP (ref 43–77)
NRBC # BLD: 0 /100 WBCS — SIGNIFICANT CHANGE UP (ref 0–0)
PLATELET # BLD AUTO: 334 K/UL — SIGNIFICANT CHANGE UP (ref 150–400)
POTASSIUM SERPL-MCNC: 4.3 MMOL/L — SIGNIFICANT CHANGE UP (ref 3.5–5.3)
POTASSIUM SERPL-SCNC: 4.3 MMOL/L — SIGNIFICANT CHANGE UP (ref 3.5–5.3)
PROT SERPL-MCNC: 7.1 G/DL — SIGNIFICANT CHANGE UP (ref 6–8.3)
PROTHROM AB SERPL-ACNC: 10 SEC — SIGNIFICANT CHANGE UP (ref 10–12.9)
RBC # BLD: 4.72 M/UL — SIGNIFICANT CHANGE UP (ref 3.8–5.2)
RBC # FLD: 12.4 % — SIGNIFICANT CHANGE UP (ref 10.3–14.5)
SODIUM SERPL-SCNC: 134 MMOL/L — LOW (ref 135–145)
WBC # BLD: 5.34 K/UL — SIGNIFICANT CHANGE UP (ref 3.8–10.5)
WBC # FLD AUTO: 5.34 K/UL — SIGNIFICANT CHANGE UP (ref 3.8–10.5)

## 2020-01-31 PROCEDURE — 93005 ELECTROCARDIOGRAM TRACING: CPT

## 2020-01-31 PROCEDURE — 70450 CT HEAD/BRAIN W/O DYE: CPT | Mod: 26

## 2020-01-31 PROCEDURE — 96360 HYDRATION IV INFUSION INIT: CPT

## 2020-01-31 PROCEDURE — 71045 X-RAY EXAM CHEST 1 VIEW: CPT

## 2020-01-31 PROCEDURE — 70450 CT HEAD/BRAIN W/O DYE: CPT

## 2020-01-31 PROCEDURE — 82962 GLUCOSE BLOOD TEST: CPT

## 2020-01-31 PROCEDURE — 85610 PROTHROMBIN TIME: CPT

## 2020-01-31 PROCEDURE — 71045 X-RAY EXAM CHEST 1 VIEW: CPT | Mod: 26

## 2020-01-31 PROCEDURE — 99284 EMERGENCY DEPT VISIT MOD MDM: CPT

## 2020-01-31 PROCEDURE — 85027 COMPLETE CBC AUTOMATED: CPT

## 2020-01-31 PROCEDURE — 85730 THROMBOPLASTIN TIME PARTIAL: CPT

## 2020-01-31 PROCEDURE — 80053 COMPREHEN METABOLIC PANEL: CPT

## 2020-01-31 PROCEDURE — 83735 ASSAY OF MAGNESIUM: CPT

## 2020-01-31 PROCEDURE — 99285 EMERGENCY DEPT VISIT HI MDM: CPT | Mod: 25

## 2020-01-31 PROCEDURE — 36415 COLL VENOUS BLD VENIPUNCTURE: CPT

## 2020-01-31 RX ORDER — SODIUM CHLORIDE 9 MG/ML
1000 INJECTION INTRAMUSCULAR; INTRAVENOUS; SUBCUTANEOUS ONCE
Refills: 0 | Status: COMPLETED | OUTPATIENT
Start: 2020-01-31 | End: 2020-01-31

## 2020-01-31 RX ADMIN — SODIUM CHLORIDE 1000 MILLILITER(S): 9 INJECTION INTRAMUSCULAR; INTRAVENOUS; SUBCUTANEOUS at 09:38

## 2020-01-31 RX ADMIN — SODIUM CHLORIDE 1000 MILLILITER(S): 9 INJECTION INTRAMUSCULAR; INTRAVENOUS; SUBCUTANEOUS at 08:38

## 2020-01-31 NOTE — ED PROVIDER NOTE - PROGRESS NOTE DETAILS
patient lab and ct head normal. remains neuro intact during ed stay, discussed with patient's daughter tawnya, patient has 7 day a week home care, have tried nh in the past and does not want patient in nursing home. does not want aggressive intervention and feels comfortable taking patient home. return precaution provided, patient ate breakfast, tolerating po. well appearing.

## 2020-01-31 NOTE — ED ADULT TRIAGE NOTE - CHIEF COMPLAINT QUOTE
numbness in the tongue x 5:30 am and generalized weakness, no facial droop and no arm drift, hx of Dementia

## 2020-01-31 NOTE — ED ADULT NURSE NOTE - OBJECTIVE STATEMENT
Pt came in accompanied by daughter from home, pt with Dementia, DM, blind  As per daughter , pt has increased confusion and refuses eating for days at a time, losing weight, stares into space, wants to R/O medical problems

## 2020-01-31 NOTE — ED PROVIDER NOTE - CLINICAL SUMMARY MEDICAL DECISION MAKING FREE TEXT BOX
Patient presenting with weakness, nonfocal exam, progressive x weeks. tongue numbness at home but resolved, history of dementia, unclear of detail of numbness. will obtain lab, ct head, xray chest, ua, assess for infection, stroke, lyte abnormality, ed obs and reassess

## 2020-01-31 NOTE — ED ADULT NURSE NOTE - NSSEPSISNEWALTERMENTAL_ED_A_ED
Patient:   DARREL RHODES            MRN: [id number]            FIN: [id number]              Age:   6 months     Sex:  MALE     :  17   Associated Diagnoses:   None   Author:   NILO WISEMAN     Discharge Summary    Admission Date:  2019    Discharge Date:  2019    Admitting Diagnoses:        Viral Bronchiolitis   Acute Respiratory Insufficiency  Hx of Congenital Heart Disease & Subglottic Stenosis    Final Diagnoses:  Viral Bronchiolitis - improving  Acute Respiratory Insufficiency - resolved  Hx of Congenital Heart Disease & Subglottic Stenosis    Pertinent Labs/Imaging Findings:   Flu and RSV (-)     Hospital Course:  HPI from Admission H&P per Dr. Antonio:  \"Patient was exam with no parent in room, Mom was called and abbreviated history obtained. Rest of history per EHR and report sent on transport.  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 Per mom patient has been having a 2 day history of cough and congestion. Patient was was having normal PO intake and having good UOP. On day of admission, patient was found to have increased WOB, patient was placed on  1/4 liter of O2 and taken to OSH. At OSH patient had a flu swab which was negative. James was noted to have stridor and was given Dexamethason 4 mg, Racemic Epi, and albuterol neb x 2. Patient's home cardiology (Dr Michelle) was spoken to and patient was transfered to [facility]     Upon arrival to the floor Patient was at 1 L o2 via NC for WOB. James had increase in Sats to low 90s (goal from 75%-90%) and so patient was able to be decreased to 0.5L. He was quickly able to be weaned to RA. Patient did not seem interested in formula but was able to take multiple bottles of pedialyte with no problems. Patient noted to have mild subcostal  retractions, no tracheal tugging and no stridor.\"    Hospital Course:  Darrel was placed on 1L o2 upon admission d/t work of breathing. Symptoms improved with suctioning. Cardiology consulted during admission d/t cardiac history and he was monitored on telemetry. No acute evens on tele and no concern for cardiac etiology of symptoms. Rather symptoms d/t viral bronchiolitis. He was weaned to RA 3/12 ~midnight and remained stable on RA the remainder of the time. He maintained good po intake and urine output. In  light of clinical improvement - stable on RA, improvement in work of breathing and minimal suctioning needs was discharged home. Discussed with cardiology on day of discharge and in agreement with d/c home. Discussed with grandmother regarding suctioning at home prior to feeds and prn. Return precautions discussed which include increased work of breathing, decrease in O2 sats from baseline, cyanosis, decreased po intake. Questions and concerns  addressed. F/u's as mentioned below.     Physical Exam on Day of Discharge:   Vitals between:   12-MAR-2019 16:33:05   TO   13-MAR-2019 16:33:05                   LAST RESULT MINIMUM MAXIMUM  Temperature 36.6 36.1 36.6  Heart Rate 114 114 136  Respiratory Rate 48 42 52  NISBP           93 90 117  NIDBP           51 44 63  NIMBP           65 61 81  SpO2                    77 73 81  FiO2                    0.21 0.21 0.21    GENERAL: Alert, Awake, No Acute Distress, Nontoxic, Active  HEENT: NC/AT, AF open soft and flat, Normal conjunctiva. Moist oral mucosa. Left TM normal. Right TM partially obscured by cerumen, visualized portion wnl.  NECK: Supple, Non-tender  RESP: Lungs with few coarse breath sounds b/l that clear with cough, no wheezing, no stridor, mild belly breathing (baseline for patient per grandmother), no tracheal tugging, no nasal flaring. on RA.  CV: Reg rate and rhythm, 1 to 2+  murmur over left upper sternal border, Normal peripheral pulses, Normal  peripheral perfusion, Brisk cap refill, No edema  ABD: Soft, Non-tender, Non-distended, No masses  EXT: moving all extremities equally, warm and well perfused  SKIN: No rashes. Healed surgical scars to chest  NEURO: Alert, Awake, No focal deficits, Interacts with family and examiner at age appropriate level.    Condition on Discharge:   Stable    Discharge Instructions:   PMD and Follow Up Appointment:  PMD F/u with Dr Marii Singleton [phone] on 3/14 at 9:30am    Consultants and Consultant Discharge Recs/Follow Up Appoinments:  Cardiology f/u on 5/13/19 at 1pm  ENT f/u on 4/1/19 at 9:30am    Labs/Imaging to be done or followed up as outpatient:  None    Medications:  Resume home multivitamins    Diet:  Resume previous    Activity:  No restrictions, resume previous as tolerated    Special Instructions:  None    Discharge Summary faxed to PMD. Thank you for allowing us to participate in the care of this patient.     Kelly Grimes DO  Pediatric Hospitalist   No

## 2020-01-31 NOTE — ED PROVIDER NOTE - PATIENT PORTAL LINK FT
You can access the FollowMyHealth Patient Portal offered by [facility] by registering at the following website: [email or web address]. By joining [facility]’s FollowMyHealth portal, you will also be able to view your health information using other applications (apps) compatible with our system.

## 2020-01-31 NOTE — ED PROVIDER NOTE - OBJECTIVE STATEMENT
74 y.o w/ pmh of dm, cad, dementia, dnr/dni presenting with numbness to the tongue today noted by family at 530a, per family, patient legally blind, has dementia and did not say which side of the tongue. noted weakness to both leg, progressive over the past few weeks, decrease appetite but has eaten breakfast x 2 today. no n, v, cp, sob, fever, chills, abd pain noted.

## 2020-01-31 NOTE — ED ADULT NURSE NOTE - NSIMPLEMENTINTERV_GEN_ALL_ED
Implemented All Fall Risk Interventions:  [place] to call system. Call bell, personal items and telephone within reach. Instruct patient to call for assistance. Room bathroom lighting operational. Non-slip footwear when patient is off stretcher. Physically safe environment: no spills, clutter or unnecessary equipment. Stretcher in lowest position, wheels locked, appropriate side rails in place. Provide visual cue, wrist band, yellow gown, etc. Monitor gait and stability. Monitor for mental status changes and reorient to person, place, and time. Review medications for side effects contributing to fall risk. Reinforce activity limits and safety measures with patient and family.

## 2020-02-01 ENCOUNTER — OUTPATIENT (OUTPATIENT)
Dept: OUTPATIENT SERVICES | Facility: HOSPITAL | Age: 75
LOS: 1 days | End: 2020-02-01

## 2020-02-21 DIAGNOSIS — Z71.89 OTHER SPECIFIED COUNSELING: ICD-10-CM

## 2020-08-25 ENCOUNTER — INPATIENT (INPATIENT)
Facility: HOSPITAL | Age: 75
LOS: 5 days | Discharge: HOME | DRG: 885 | End: 2020-08-31
Attending: INTERNAL MEDICINE | Admitting: INTERNAL MEDICINE
Payer: MEDICARE

## 2020-08-25 VITALS
HEART RATE: 82 BPM | TEMPERATURE: 98 F | OXYGEN SATURATION: 96 % | RESPIRATION RATE: 17 BRPM | DIASTOLIC BLOOD PRESSURE: 65 MMHG | SYSTOLIC BLOOD PRESSURE: 110 MMHG

## 2020-08-25 NOTE — ED PROVIDER NOTE - PMH
Blindness    CAD (coronary artery disease)    Dementia    Depression    Diabetes    Dyslipidemia    History of hypertension

## 2020-08-25 NOTE — ED PROVIDER NOTE - OBJECTIVE STATEMENT
75 year old female with PMHx of dementia, depression, diabetes, dyslipidemia, hypertension, CAD, and recent blindness and PSHx of three coronary stent placements brought into the ED by her daughter who states that patient has been declining for the past six weeks. Daughter reports that patient has barely been eating and drinking, has been refusing her medications, and has become more agitated. Daughter states that she has been at home caring for patient, but that now that she will be returning to work she is concerned that patient will continue to decline further. In the ED, patient is only complaining of some new neck pain. Patient denies any fevers, chills, dizziness, lightheadedness, and all other acute complaints. NKDA. 75 year old female with PMHx of dementia, depression, diabetes, dyslipidemia, hypertension, CAD, and recent blindness and PSHx of three coronary stent placements brought into the ED by her daughter who states that patient has been declining for the past six weeks. Daughter reports that patient has barely been eating and drinking, has been refusing her medications, and has become more agitated. Daughter states that she has been at home caring for patient, but that now that she will be returning to work she is concerned that patient will continue to decline further. In the ED, patient is only complaining of some new neck pain. Patient denies any fevers, chills, dizziness, lightheadedness, and all other acute complaints. Patient is noted to have a twelve hour home health aide. NKDA.

## 2020-08-26 DIAGNOSIS — I10 ESSENTIAL (PRIMARY) HYPERTENSION: ICD-10-CM

## 2020-08-26 DIAGNOSIS — Z29.9 ENCOUNTER FOR PROPHYLACTIC MEASURES, UNSPECIFIED: ICD-10-CM

## 2020-08-26 DIAGNOSIS — R62.7 ADULT FAILURE TO THRIVE: ICD-10-CM

## 2020-08-26 DIAGNOSIS — I25.10 ATHEROSCLEROTIC HEART DISEASE OF NATIVE CORONARY ARTERY WITHOUT ANGINA PECTORIS: ICD-10-CM

## 2020-08-26 DIAGNOSIS — R41.82 ALTERED MENTAL STATUS, UNSPECIFIED: ICD-10-CM

## 2020-08-26 DIAGNOSIS — Z95.5 PRESENCE OF CORONARY ANGIOPLASTY IMPLANT AND GRAFT: Chronic | ICD-10-CM

## 2020-08-26 DIAGNOSIS — E11.9 TYPE 2 DIABETES MELLITUS WITHOUT COMPLICATIONS: ICD-10-CM

## 2020-08-26 LAB
A1C WITH ESTIMATED AVERAGE GLUCOSE RESULT: 9.2 % — HIGH (ref 4–5.6)
ALBUMIN SERPL ELPH-MCNC: 3.2 G/DL — LOW (ref 3.5–5)
ALP SERPL-CCNC: 107 U/L — SIGNIFICANT CHANGE UP (ref 40–120)
ALT FLD-CCNC: 12 U/L DA — SIGNIFICANT CHANGE UP (ref 10–60)
ANION GAP SERPL CALC-SCNC: 3 MMOL/L — LOW (ref 5–17)
ANION GAP SERPL CALC-SCNC: 8 MMOL/L — SIGNIFICANT CHANGE UP (ref 5–17)
APPEARANCE UR: CLEAR — SIGNIFICANT CHANGE UP
AST SERPL-CCNC: 15 U/L — SIGNIFICANT CHANGE UP (ref 10–40)
BACTERIA # UR AUTO: ABNORMAL /HPF
BASOPHILS # BLD AUTO: 0.05 K/UL — SIGNIFICANT CHANGE UP (ref 0–0.2)
BASOPHILS NFR BLD AUTO: 0.9 % — SIGNIFICANT CHANGE UP (ref 0–2)
BILIRUB SERPL-MCNC: 0.4 MG/DL — SIGNIFICANT CHANGE UP (ref 0.2–1.2)
BILIRUB UR-MCNC: NEGATIVE — SIGNIFICANT CHANGE UP
BUN SERPL-MCNC: 21 MG/DL — HIGH (ref 7–18)
BUN SERPL-MCNC: 22 MG/DL — HIGH (ref 7–18)
CALCIUM SERPL-MCNC: 8.8 MG/DL — SIGNIFICANT CHANGE UP (ref 8.4–10.5)
CALCIUM SERPL-MCNC: 8.8 MG/DL — SIGNIFICANT CHANGE UP (ref 8.4–10.5)
CHLORIDE SERPL-SCNC: 101 MMOL/L — SIGNIFICANT CHANGE UP (ref 96–108)
CHLORIDE SERPL-SCNC: 98 MMOL/L — SIGNIFICANT CHANGE UP (ref 96–108)
CHOLEST SERPL-MCNC: 123 MG/DL — SIGNIFICANT CHANGE UP (ref 10–199)
CK SERPL-CCNC: 48 U/L — SIGNIFICANT CHANGE UP (ref 21–215)
CO2 SERPL-SCNC: 29 MMOL/L — SIGNIFICANT CHANGE UP (ref 22–31)
CO2 SERPL-SCNC: 31 MMOL/L — SIGNIFICANT CHANGE UP (ref 22–31)
COLOR SPEC: YELLOW — SIGNIFICANT CHANGE UP
CREAT SERPL-MCNC: 1.14 MG/DL — SIGNIFICANT CHANGE UP (ref 0.5–1.3)
CREAT SERPL-MCNC: 1.16 MG/DL — SIGNIFICANT CHANGE UP (ref 0.5–1.3)
DIFF PNL FLD: ABNORMAL
EOSINOPHIL # BLD AUTO: 0.1 K/UL — SIGNIFICANT CHANGE UP (ref 0–0.5)
EOSINOPHIL NFR BLD AUTO: 1.7 % — SIGNIFICANT CHANGE UP (ref 0–6)
EPI CELLS # UR: SIGNIFICANT CHANGE UP /HPF
ESTIMATED AVERAGE GLUCOSE: 217 MG/DL — HIGH (ref 68–114)
FOLATE SERPL-MCNC: 9.9 NG/ML — SIGNIFICANT CHANGE UP
GLUCOSE BLDC GLUCOMTR-MCNC: 182 MG/DL — HIGH (ref 70–99)
GLUCOSE BLDC GLUCOMTR-MCNC: 238 MG/DL — HIGH (ref 70–99)
GLUCOSE BLDC GLUCOMTR-MCNC: 288 MG/DL — HIGH (ref 70–99)
GLUCOSE BLDC GLUCOMTR-MCNC: 300 MG/DL — HIGH (ref 70–99)
GLUCOSE BLDC GLUCOMTR-MCNC: 324 MG/DL — HIGH (ref 70–99)
GLUCOSE BLDC GLUCOMTR-MCNC: 97 MG/DL — SIGNIFICANT CHANGE UP (ref 70–99)
GLUCOSE SERPL-MCNC: 291 MG/DL — HIGH (ref 70–99)
GLUCOSE SERPL-MCNC: 313 MG/DL — HIGH (ref 70–99)
GLUCOSE UR QL: 1000 MG/DL
HCT VFR BLD CALC: 36.8 % — SIGNIFICANT CHANGE UP (ref 34.5–45)
HCT VFR BLD CALC: 39.3 % — SIGNIFICANT CHANGE UP (ref 34.5–45)
HDLC SERPL-MCNC: 42 MG/DL — LOW
HGB BLD-MCNC: 11.8 G/DL — SIGNIFICANT CHANGE UP (ref 11.5–15.5)
HGB BLD-MCNC: 12.8 G/DL — SIGNIFICANT CHANGE UP (ref 11.5–15.5)
IMM GRANULOCYTES NFR BLD AUTO: 0.3 % — SIGNIFICANT CHANGE UP (ref 0–1.5)
KETONES UR-MCNC: NEGATIVE — SIGNIFICANT CHANGE UP
LEUKOCYTE ESTERASE UR-ACNC: NEGATIVE — SIGNIFICANT CHANGE UP
LIPID PNL WITH DIRECT LDL SERPL: 55 MG/DL — SIGNIFICANT CHANGE UP
LYMPHOCYTES # BLD AUTO: 2.3 K/UL — SIGNIFICANT CHANGE UP (ref 1–3.3)
LYMPHOCYTES # BLD AUTO: 39.6 % — SIGNIFICANT CHANGE UP (ref 13–44)
MAGNESIUM SERPL-MCNC: 2.1 MG/DL — SIGNIFICANT CHANGE UP (ref 1.6–2.6)
MAGNESIUM SERPL-MCNC: 2.1 MG/DL — SIGNIFICANT CHANGE UP (ref 1.6–2.6)
MCHC RBC-ENTMCNC: 27.8 PG — SIGNIFICANT CHANGE UP (ref 27–34)
MCHC RBC-ENTMCNC: 28.2 PG — SIGNIFICANT CHANGE UP (ref 27–34)
MCHC RBC-ENTMCNC: 32.1 GM/DL — SIGNIFICANT CHANGE UP (ref 32–36)
MCHC RBC-ENTMCNC: 32.6 GM/DL — SIGNIFICANT CHANGE UP (ref 32–36)
MCV RBC AUTO: 86.6 FL — SIGNIFICANT CHANGE UP (ref 80–100)
MCV RBC AUTO: 86.6 FL — SIGNIFICANT CHANGE UP (ref 80–100)
MONOCYTES # BLD AUTO: 0.52 K/UL — SIGNIFICANT CHANGE UP (ref 0–0.9)
MONOCYTES NFR BLD AUTO: 9 % — SIGNIFICANT CHANGE UP (ref 2–14)
NEUTROPHILS # BLD AUTO: 2.82 K/UL — SIGNIFICANT CHANGE UP (ref 1.8–7.4)
NEUTROPHILS NFR BLD AUTO: 48.5 % — SIGNIFICANT CHANGE UP (ref 43–77)
NITRITE UR-MCNC: NEGATIVE — SIGNIFICANT CHANGE UP
NRBC # BLD: 0 /100 WBCS — SIGNIFICANT CHANGE UP (ref 0–0)
NRBC # BLD: 0 /100 WBCS — SIGNIFICANT CHANGE UP (ref 0–0)
PH UR: 5 — SIGNIFICANT CHANGE UP (ref 5–8)
PHOSPHATE SERPL-MCNC: 2.7 MG/DL — SIGNIFICANT CHANGE UP (ref 2.5–4.5)
PLATELET # BLD AUTO: 328 K/UL — SIGNIFICANT CHANGE UP (ref 150–400)
PLATELET # BLD AUTO: 349 K/UL — SIGNIFICANT CHANGE UP (ref 150–400)
POTASSIUM SERPL-MCNC: 3.6 MMOL/L — SIGNIFICANT CHANGE UP (ref 3.5–5.3)
POTASSIUM SERPL-MCNC: 4 MMOL/L — SIGNIFICANT CHANGE UP (ref 3.5–5.3)
POTASSIUM SERPL-SCNC: 3.6 MMOL/L — SIGNIFICANT CHANGE UP (ref 3.5–5.3)
POTASSIUM SERPL-SCNC: 4 MMOL/L — SIGNIFICANT CHANGE UP (ref 3.5–5.3)
PROT SERPL-MCNC: 6.9 G/DL — SIGNIFICANT CHANGE UP (ref 6–8.3)
PROT UR-MCNC: 15
RBC # BLD: 4.25 M/UL — SIGNIFICANT CHANGE UP (ref 3.8–5.2)
RBC # BLD: 4.54 M/UL — SIGNIFICANT CHANGE UP (ref 3.8–5.2)
RBC # FLD: 13.2 % — SIGNIFICANT CHANGE UP (ref 10.3–14.5)
RBC # FLD: 13.2 % — SIGNIFICANT CHANGE UP (ref 10.3–14.5)
RBC CASTS # UR COMP ASSIST: ABNORMAL /HPF (ref 0–2)
SARS-COV-2 IGG SERPL QL IA: NEGATIVE — SIGNIFICANT CHANGE UP
SARS-COV-2 IGM SERPL IA-ACNC: 0.07 INDEX — SIGNIFICANT CHANGE UP
SARS-COV-2 RNA SPEC QL NAA+PROBE: SIGNIFICANT CHANGE UP
SODIUM SERPL-SCNC: 135 MMOL/L — SIGNIFICANT CHANGE UP (ref 135–145)
SODIUM SERPL-SCNC: 135 MMOL/L — SIGNIFICANT CHANGE UP (ref 135–145)
SP GR SPEC: 1.01 — SIGNIFICANT CHANGE UP (ref 1.01–1.02)
T PALLIDUM AB TITR SER: NEGATIVE — SIGNIFICANT CHANGE UP
TOTAL CHOLESTEROL/HDL RATIO MEASUREMENT: 2.9 RATIO — LOW (ref 3.3–7.1)
TRIGL SERPL-MCNC: 130 MG/DL — SIGNIFICANT CHANGE UP (ref 10–149)
TROPONIN I SERPL-MCNC: <0.015 NG/ML — SIGNIFICANT CHANGE UP (ref 0–0.04)
TSH SERPL-MCNC: 1.13 UU/ML — SIGNIFICANT CHANGE UP (ref 0.34–4.82)
TSH SERPL-MCNC: 2.5 UU/ML — SIGNIFICANT CHANGE UP (ref 0.34–4.82)
UROBILINOGEN FLD QL: NEGATIVE — SIGNIFICANT CHANGE UP
VIT B12 SERPL-MCNC: 727 PG/ML — SIGNIFICANT CHANGE UP (ref 232–1245)
WBC # BLD: 5.81 K/UL — SIGNIFICANT CHANGE UP (ref 3.8–10.5)
WBC # BLD: 6.28 K/UL — SIGNIFICANT CHANGE UP (ref 3.8–10.5)
WBC # FLD AUTO: 5.81 K/UL — SIGNIFICANT CHANGE UP (ref 3.8–10.5)
WBC # FLD AUTO: 6.28 K/UL — SIGNIFICANT CHANGE UP (ref 3.8–10.5)
WBC UR QL: SIGNIFICANT CHANGE UP /HPF (ref 0–5)

## 2020-08-26 PROCEDURE — 70450 CT HEAD/BRAIN W/O DYE: CPT | Mod: 26

## 2020-08-26 PROCEDURE — 99222 1ST HOSP IP/OBS MODERATE 55: CPT

## 2020-08-26 PROCEDURE — 99285 EMERGENCY DEPT VISIT HI MDM: CPT | Mod: 25

## 2020-08-26 PROCEDURE — 72125 CT NECK SPINE W/O DYE: CPT | Mod: 26

## 2020-08-26 RX ORDER — INSULIN LISPRO 100/ML
VIAL (ML) SUBCUTANEOUS AT BEDTIME
Refills: 0 | Status: DISCONTINUED | OUTPATIENT
Start: 2020-08-26 | End: 2020-08-30

## 2020-08-26 RX ORDER — INSULIN GLARGINE 100 [IU]/ML
15 INJECTION, SOLUTION SUBCUTANEOUS EVERY MORNING
Refills: 0 | Status: DISCONTINUED | OUTPATIENT
Start: 2020-08-26 | End: 2020-08-26

## 2020-08-26 RX ORDER — INSULIN LISPRO 100/ML
VIAL (ML) SUBCUTANEOUS
Refills: 0 | Status: DISCONTINUED | OUTPATIENT
Start: 2020-08-26 | End: 2020-08-31

## 2020-08-26 RX ORDER — INSULIN GLARGINE 100 [IU]/ML
10 INJECTION, SOLUTION SUBCUTANEOUS EVERY MORNING
Refills: 0 | Status: DISCONTINUED | OUTPATIENT
Start: 2020-08-26 | End: 2020-08-27

## 2020-08-26 RX ORDER — GLUCAGON INJECTION, SOLUTION 0.5 MG/.1ML
1 INJECTION, SOLUTION SUBCUTANEOUS ONCE
Refills: 0 | Status: DISCONTINUED | OUTPATIENT
Start: 2020-08-26 | End: 2020-08-31

## 2020-08-26 RX ORDER — DEXTROSE 50 % IN WATER 50 %
12.5 SYRINGE (ML) INTRAVENOUS ONCE
Refills: 0 | Status: DISCONTINUED | OUTPATIENT
Start: 2020-08-26 | End: 2020-08-31

## 2020-08-26 RX ORDER — HALOPERIDOL DECANOATE 100 MG/ML
2.5 INJECTION INTRAMUSCULAR ONCE
Refills: 0 | Status: COMPLETED | OUTPATIENT
Start: 2020-08-26 | End: 2020-08-26

## 2020-08-26 RX ORDER — ATORVASTATIN CALCIUM 80 MG/1
40 TABLET, FILM COATED ORAL AT BEDTIME
Refills: 0 | Status: DISCONTINUED | OUTPATIENT
Start: 2020-08-26 | End: 2020-08-31

## 2020-08-26 RX ORDER — DEXTROSE 50 % IN WATER 50 %
15 SYRINGE (ML) INTRAVENOUS ONCE
Refills: 0 | Status: DISCONTINUED | OUTPATIENT
Start: 2020-08-26 | End: 2020-08-31

## 2020-08-26 RX ORDER — BUPROPION HYDROCHLORIDE 150 MG/1
75 TABLET, EXTENDED RELEASE ORAL DAILY
Refills: 0 | Status: DISCONTINUED | OUTPATIENT
Start: 2020-08-26 | End: 2020-08-26

## 2020-08-26 RX ORDER — MEMANTINE HYDROCHLORIDE 10 MG/1
5 TABLET ORAL
Refills: 0 | Status: DISCONTINUED | OUTPATIENT
Start: 2020-08-26 | End: 2020-08-27

## 2020-08-26 RX ORDER — LANOLIN ALCOHOL/MO/W.PET/CERES
3 CREAM (GRAM) TOPICAL AT BEDTIME
Refills: 0 | Status: DISCONTINUED | OUTPATIENT
Start: 2020-08-26 | End: 2020-08-31

## 2020-08-26 RX ORDER — HALOPERIDOL DECANOATE 100 MG/ML
1 INJECTION INTRAMUSCULAR ONCE
Refills: 0 | Status: DISCONTINUED | OUTPATIENT
Start: 2020-08-26 | End: 2020-08-26

## 2020-08-26 RX ORDER — ENOXAPARIN SODIUM 100 MG/ML
40 INJECTION SUBCUTANEOUS DAILY
Refills: 0 | Status: DISCONTINUED | OUTPATIENT
Start: 2020-08-26 | End: 2020-08-31

## 2020-08-26 RX ORDER — BUPROPION HYDROCHLORIDE 150 MG/1
150 TABLET, EXTENDED RELEASE ORAL DAILY
Refills: 0 | Status: DISCONTINUED | OUTPATIENT
Start: 2020-08-26 | End: 2020-08-27

## 2020-08-26 RX ORDER — DEXTROSE 50 % IN WATER 50 %
25 SYRINGE (ML) INTRAVENOUS ONCE
Refills: 0 | Status: DISCONTINUED | OUTPATIENT
Start: 2020-08-26 | End: 2020-08-31

## 2020-08-26 RX ORDER — ASPIRIN/CALCIUM CARB/MAGNESIUM 324 MG
81 TABLET ORAL DAILY
Refills: 0 | Status: DISCONTINUED | OUTPATIENT
Start: 2020-08-26 | End: 2020-08-31

## 2020-08-26 RX ORDER — LISINOPRIL 2.5 MG/1
5 TABLET ORAL DAILY
Refills: 0 | Status: DISCONTINUED | OUTPATIENT
Start: 2020-08-26 | End: 2020-08-31

## 2020-08-26 RX ORDER — SODIUM CHLORIDE 9 MG/ML
1000 INJECTION INTRAMUSCULAR; INTRAVENOUS; SUBCUTANEOUS
Refills: 0 | Status: DISCONTINUED | OUTPATIENT
Start: 2020-08-26 | End: 2020-08-31

## 2020-08-26 RX ORDER — SODIUM CHLORIDE 9 MG/ML
1000 INJECTION, SOLUTION INTRAVENOUS
Refills: 0 | Status: DISCONTINUED | OUTPATIENT
Start: 2020-08-26 | End: 2020-08-31

## 2020-08-26 RX ADMIN — Medication 1: at 17:21

## 2020-08-26 RX ADMIN — MEMANTINE HYDROCHLORIDE 5 MILLIGRAM(S): 10 TABLET ORAL at 05:53

## 2020-08-26 RX ADMIN — Medication 81 MILLIGRAM(S): at 13:15

## 2020-08-26 RX ADMIN — Medication 2: at 21:38

## 2020-08-26 RX ADMIN — MEMANTINE HYDROCHLORIDE 5 MILLIGRAM(S): 10 TABLET ORAL at 17:39

## 2020-08-26 RX ADMIN — LISINOPRIL 5 MILLIGRAM(S): 2.5 TABLET ORAL at 05:52

## 2020-08-26 RX ADMIN — Medication 4: at 08:08

## 2020-08-26 RX ADMIN — INSULIN GLARGINE 10 UNIT(S): 100 INJECTION, SOLUTION SUBCUTANEOUS at 08:55

## 2020-08-26 RX ADMIN — SODIUM CHLORIDE 100 MILLILITER(S): 9 INJECTION INTRAMUSCULAR; INTRAVENOUS; SUBCUTANEOUS at 08:00

## 2020-08-26 RX ADMIN — HALOPERIDOL DECANOATE 2.5 MILLIGRAM(S): 100 INJECTION INTRAMUSCULAR at 03:45

## 2020-08-26 RX ADMIN — BUPROPION HYDROCHLORIDE 150 MILLIGRAM(S): 150 TABLET, EXTENDED RELEASE ORAL at 13:14

## 2020-08-26 RX ADMIN — ENOXAPARIN SODIUM 40 MILLIGRAM(S): 100 INJECTION SUBCUTANEOUS at 13:14

## 2020-08-26 NOTE — H&P ADULT - ASSESSMENT
77 yo F from home lives with  and [place] ambulates with walker at baseline, HHA 12 hours  PMH dementia, IDDM, HLD, CAD sp 3 stents, legally blind presented with decline in cognitive function X6 weeks and worsening agitation. On my evaluation patient is AAOX 2-3, daughter  Reported patient refused to eat ,drink and having her medications. Patient c/o new onset neck pain 6/10 , aggravates with movements. Patient states she does  not have any appetite and lost weight. Denies fever, chills, palpitation chest wendy , urinary sx, N/V/ D or any other acute complains . patient admitted for FTT and placement. 76 yo F from home lives with  and [place] ambulates with walker at baseline, HHA 12 hours  PMH dementia, IDDM, HLD, CAD sp 3 stents, legally blind presented with decline in cognitive function X6 weeks and worsening agitation. On my evaluation patient is AAOX 2-3, daughter  Reported patient refused to eat ,drink and having her medications. Patient c/o new onset neck pain 6/10 , aggravates with movements. Patient states she does  not have any appetite and lost weight. Denies fever, chills, palpitation chest wendy , urinary sx, N/V/ D or any other acute complains . patient admitted for FTT and placement.

## 2020-08-26 NOTE — H&P ADULT - NSHPPHYSICALEXAM_GEN_ALL_CORE
CONSTITUTIONAL: Well appearing, well nourished, awake, alert and in no apparent distress  ENMT: Airway patent, Nasal mucosa clear. Mouth with normal mucosa. Throat has no vesicles, no oropharyngeal exudates and uvula is midline.  EYES: Clear bilaterally, pupils equal, round and reactive to light. EOMI.  CARDIAC: Normal rate, regular rhythm.  Heart sounds S1, S2.  No murmurs, rubs or gallops   RESPIRATORY: Breath sounds clear and equal bilaterally. No wheezes, rhales or rhonchi  MUSCULOSKELETAL: Spine appears normal, range of motion is not limited, no muscle or joint tenderness  EXTREMITIES: No edema, cyanosis or deformity   NEUROLOGICAL: Alert and oriented, no focal deficits, no motor or sensory deficits.  SKIN: No rash, skin turgor    Abdoman : soft nontender CONSTITUTIONAL: cachectic , agitated   ENMT: dry mucosa   EYES: Clear bilaterally, pupils equal, round and reactive to light. EOMI.  CARDIAC: Normal rate, regular rhythm.  Heart sounds S1, S2.  No murmurs, rubs or gallops   RESPIRATORY: Breath sounds clear and equal bilaterally. No wheezes, rhales or rhonchi  MUSCULOSKELETAL: Spine appears normal, range of motion is not limited, no muscle or joint tenderness  EXTREMITIES: No edema, cyanosis or deformity   NEUROLOGICAL: Alert and oriented, no focal deficits, no motor or sensory deficits.  SKIN: No rash, skin turgor    Abdoman : soft nontender

## 2020-08-26 NOTE — H&P ADULT - HISTORY OF PRESENT ILLNESS
77 yo F from home lives with  and [place] ambulates with --- at baseline, HHA 12 hours  PMH dementia, IDDM, HLD, CAD sp 3 stents, legally blind presented with decline in cognitive [id number] and worsening agitation . History was taken by daughter at bedside. Reported patient refused to eat ,drink and having her medications. Patient c/o new onset neck pain , aggravates with --- .l alleviates witnh ....    Daughter express concerns as she will soon starts to work and will not be able to take care of mother and he would possibly decline further. 77 yo F from home lives with  and [place] ambulates with walker at baseline, HHA 12 hours  PMH dementia, IDDM, HLD, CAD sp 3 stents, legally blind presented with decline in cognitive function X6 weeks and worsening agitation. On my evaluation patient is AAOX 2-3, daughter  Reported patient refused to eat ,drink and having her medications. Patient c/o new onset neck pain 6/10 , aggravates with movements. Patient states she does  not have any appetite and lost weight. Denies fever, chills, palpitation chest wendy , urinary sx, N/V/ D or any other acute complains      Daughter express concerns as she will soon starts to work and will not be able to take care of mother and he would possibly decline further.    GOC : will discuss with daughter 79 yo F from home lives with  and [place] ambulates with walker at baseline, HHA 12 hours  PMH dementia, IDDM, HLD, CAD sp 3 stents, legally blind presented with decline in cognitive function X6 weeks and worsening agitation. On my evaluation patient is AAOX 2-3, daughter  Reported patient refused to eat ,drink and having her medications. Patient c/o new onset neck pain 6/10 , aggravates with movements. Patient states she does  not have any appetite and lost weight. Denies fever, chills, palpitation chest wendy , urinary sx, N/V/ D or any other acute complains      Daughter express concerns as she will soon starts to work and will not be able to take care of mother and he would possibly decline further.   GOC : remains full code for now 76 yo F from home lives with  and [place] ambulates with walker at baseline, HHA 12 hours  PMH dementia, IDDM, HLD, CAD sp 3 stents, legally blind presented with decline in cognitive function X6 weeks and worsening agitation. On my evaluation patient is AAOX 2-3, daughter  Reported patient refused to eat ,drink and having her medications. Patient c/o new onset neck pain 6/10 , aggravates with movements. Patient states she does  not have any appetite and lost weight. Denies fever, chills, palpitation chest wendy , urinary sx, N/V/ D or any other acute complains      Daughter express concerns as she will soon starts to work and will not be able to take care of mother and he would possibly decline further.   GOC : remains full code for now

## 2020-08-26 NOTE — H&P ADULT - PROBLEM SELECTOR PLAN 4
Patient on Insulin at home ( Lantus 15 , lispro 7premeals)    will start with Lantus at bedtime and mild HSS    f/u Fs and Ha1c

## 2020-08-26 NOTE — H&P ADULT - NSHPSOCIALHISTORY_GEN_ALL_CORE
Alcohol: Denied  Smoking: Former smoker , stopped 14 yrs ago, 2-3 cigarettes per day   Illicit drugs: Denied  Previous Profession:

## 2020-08-26 NOTE — CHART NOTE - NSCHARTNOTEFT_GEN_A_CORE
Patient admitted earlier this morning for poor oral intake in the setting of chronic vascular dementia and difficultly with care at home.     Patient seen and examined this afternoon with her  at bedside. Patient is minimally cooperate with exam, oriented to self but otherwise did not answer any other questions. Her  states that at baseline she does not engage in much activity, primarily sleeps most of the day and has not been eating well for months. He states he would like to take her back home however per conversation with admitted team overnight daughter was expressing concerns about being able to take of her mother once she returns to work soon.    VITALS:  Vital Signs Last 24 Hrs  T(C): 36.4 (26 Aug 2020 17:46), Max: 37.4 (26 Aug 2020 05:14)  T(F): 97.5 (26 Aug 2020 17:46), Max: 99.3 (26 Aug 2020 05:14)  HR: 70 (26 Aug 2020 17:46) (65 - 82)  BP: 155/71 (26 Aug 2020 17:46) (102/56 - 164/60)  BP(mean): --  RR: 18 (26 Aug 2020 17:46) (16 - 18)  SpO2: 100% (26 Aug 2020 17:46) (96% - 100%)    EXAM:  GEN: laying on side in bed, minimally cooperative with exam  HEENT: legally blind  CVS: rrr, normal s1/s2  RESP: clear bilaterally, in no respiratory distress  ABD: soft, nontender, nondistended, normoactive bowel sounds  EXT: no LE edema  NEURO: aao to self only    LABS:                        11.8   6.28  )-----------( 349      ( 26 Aug 2020 06:07 )             36.8     08-26    135  |  101  |  22<H>  ----------------------------<  313<H>  4.0   |  31  |  1.14    Ca    8.8      26 Aug 2020 06:07  Phos  2.7     08-26  Mg     2.1     08-26    TPro  6.9  /  Alb  3.2<L>  /  TBili  0.4  /  DBili  x   /  AST  15  /  ALT  12  /  AlkPhos  107  08-26      IMAGING: reviewed    ASSESSMENT/PLAN:  79 y/o female with a PMH of dementia, IDDM, HLD, CAD s/p 3 stents, and legally blind who was admitted for worsening vascular dementia and difficultly for daughter to manage care at home. Possible behavioral issues at home, however none thus far at the hospital, can start on seroquel at bedtime and prn if having behavioral issues. Otherwise no signs of infection or stroke, primarily appears to be progression of vascular dementia with failure to thrive. Palliative, nutrition, and social work consult. Spoke with  about potential alternative means of nutrition, would like to speak with his daughter first.    #Vascular dementia with now failure to thrive  -No signs of infection, neuroimaging negative for acute pathologies  -appears to be progression of underlying dementia, now developing failure to thrive  -continue home Melatonin, buproprion, memantine  -can add seroquel if behavior disturbances  -c/w frequent reorientaion  -Palliative consult  -Nutrition consult  - consult    #CAD, HTN  -c/w home meds    #DM type 2  -on decreased dose of Lantus at 10 units (on 15 units at home)  -hold home premeal lispro (7 units) until PO intake improves  -continue SSI and FS monitoring    #DVT ppx  -Lovenox

## 2020-08-26 NOTE — H&P ADULT - PROBLEM SELECTOR PLAN 3
Patient Patient with h/o Cad and stent X3 on aspirin, statin and Acei  will continue with home meds    f/u lipid panel

## 2020-08-26 NOTE — CHART NOTE - NSCHARTNOTEFT_GEN_A_CORE
Chart and meds reviewed.  Patient seen and examined.    CC: agitation    HPI: 75 year old woman with hx of CAD, HTN, DM, vascular dementia, Bipolar affective disorder- non compliant with meds, presented from home brought in by daughter for progressive worsening agitation, decrease po intake with family unable to provide adequate care with pt's progressive symptoms    ROS/SUBJECTIVE: pt with limited ability to process information for ROS but she denies pain or feelings of discomfort      MEDICATIONS  (STANDING):  aspirin  chewable 81 milliGRAM(s) Oral daily  atorvastatin 40 milliGRAM(s) Oral at bedtime  buPROPion XL . 150 milliGRAM(s) Oral daily  dextrose 5%. 1000 milliLiter(s) (50 mL/Hr) IV Continuous <Continuous>  dextrose 50% Injectable 12.5 Gram(s) IV Push once  dextrose 50% Injectable 25 Gram(s) IV Push once  dextrose 50% Injectable 25 Gram(s) IV Push once  enoxaparin Injectable 40 milliGRAM(s) SubCutaneous daily  insulin glargine Injectable (LANTUS) 10 Unit(s) SubCutaneous every morning  insulin lispro (HumaLOG) corrective regimen sliding scale   SubCutaneous three times a day before meals  insulin lispro (HumaLOG) corrective regimen sliding scale   SubCutaneous at bedtime  lisinopril 5 milliGRAM(s) Oral daily  melatonin 3 milliGRAM(s) Oral at bedtime  memantine 5 milliGRAM(s) Oral two times a day  sodium chloride 0.9%. 1000 milliLiter(s) (100 mL/Hr) IV Continuous <Continuous>    MEDICATIONS  (PRN):  dextrose 40% Gel 15 Gram(s) Oral once PRN Blood Glucose LESS THAN 70 milliGRAM(s)/deciliter  glucagon  Injectable 1 milliGRAM(s) IntraMuscular once PRN Glucose LESS THAN 70 milligrams/deciliter      VITALS:  Vital Signs Last 24 Hrs  T(C): 36.4 (26 Aug 2020 16:30), Max: 37.4 (26 Aug 2020 05:14)  T(F): 97.5 (26 Aug 2020 16:30), Max: 99.3 (26 Aug 2020 05:14)  HR: 77 (26 Aug 2020 16:30) (65 - 82)  BP: 164/60 (26 Aug 2020 16:30) (102/56 - 164/60)  BP(mean): --  RR: 18 (26 Aug 2020 16:30) (16 - 18)  SpO2: 97% (26 Aug 2020 16:30) (96% - 99%)    PHYSICAL EXAM:    HEENT: AT/NC, pupils round equal, nonicteric, no exudates, no oral thrush    NECK:   supple    CV:  +S1, +S2, regular    RESP:   lungs clear to auscultation bilaterally, no wheezing, rales, rhonchi, good air entry bilaterally    BREAST:  not examined    GI:  abdomen soft, non-tender, non-distended, normal BS, no bruits, no palpable masses    RECTAL:  not examined    MSK:   normal muscle tone, no atrophy    EXT:   no clubbing, no cyanosis, no edema, no calf pain, swelling or erythema    NEURO:  AAOX1, stays quiet mostly, makes needs known in [place] by yelling (ask for bread, water in [place]), follows some simple commands, able to move extremities spontaneously,     SKIN:  no ulcers    LABS:                       11.8   6.28  )-----------( 349      ( 26 Aug 2020 06:07 )             36.8     08-    135  |  101  |  22<H>  ----------------------------<  313<H>  4.0   |  31  |  1.14    Ca    8.8      26 Aug 2020 06:07  Phos  2.7     08-  Mg     2.1     08-    TPro  6.9  /  Alb  3.2<L>  /  TBili  0.4  /  DBili  x   /  AST  15  /  ALT  12  /  AlkPhos  107  08-26    CARDIAC MARKERS ( 26 Aug 2020 06:07 )  x     / x     / 48 U/L / x     / x      CARDIAC MARKERS ( 26 Aug 2020 00:30 )  <0.015 ng/mL / x     / x     / x     / x        LIVER FUNCTIONS - ( 26 Aug 2020 00:30 )  Alb: 3.2 g/dL / Pro: 6.9 g/dL / ALK PHOS: 107 U/L / ALT: 12 U/L DA / AST: 15 U/L / GGT: x           Urinalysis Basic - ( 26 Aug 2020 01:35 )    Color: Yellow / Appearance: Clear / S.010 / pH: x  Gluc: x / Ketone: Negative  / Bili: Negative / Urobili: Negative   Blood: x / Protein: 15 / Nitrite: Negative   Leuk Esterase: Negative / RBC: 25-50 /HPF / WBC 0-2 /HPF   Sq Epi: x / Non Sq Epi: Few /HPF / Bacteria: Trace /HPF    Blood, Urine: Moderate ( @ 01:35)      ASSESSMENT   1. Acute on chronic cognitive dysfunction likely 2/2 vascular dementia progression   2. Failure to thrive in adult likely due to advance dementia with decrease po intake  3. CAD  4. Hypertensive heart disease  5. T2DM poorly controlled with A1c 9.2  6. severe circadian rhythm disturbance  7. Vascular Dementia with behavioral symptoms  8. Bipolar disorder    PLAN  CTH  no acute finding  TSH wnl (2.50)  CE's negative  UA +glucose otherwise unimpressive for infectious process  she passed dysphagia screen in ED  tolerated meals well in EDHL w/o cough or difficulty swallowing  chest pain free  fingerstick AC/HS with ISS  BP controlled  con't home dose ACE-I  vitals per protocol   con't asa/statin    con't melatonin, bupropion , memantine  con't Lantus at bedtime and mild HSS   DVT Prophylactic measure with lovenox 40mg qd  SW consulted for post discharge needs  Spoke with Ivania Graf via telephone [phone] who verbalized that she does NOT want pt to go to rehab, she stated that she wants "her back on her meds at home and for her to take meds again".  She reports that pt's hasn't been consistent in taking her meds because she hasn't been eating or drinking which is the means by which she tends to give pt her meds (she puts pt's meds in her food).  nutrition consult  PT consult    Goals of care d/w Ivania Graf (HCP)  DNI/DNR/ no surgery  MOLST form filled out via telephone consent - [phone] Chart and meds reviewed.  Patient seen and examined.    CC: agitation    HPI: 75 year old woman with hx of CAD, HTN, DM, vascular dementia, Bipolar affective disorder- non compliant with meds, presented from home brought in by daughter for progressive worsening agitation, decrease po intake with family unable to provide adequate care with pt's progressive symptoms    ROS/SUBJECTIVE: pt with limited ability to process information for ROS but she denies pain or feelings of discomfort;  at bedside in EDHL deferred discussion regarding discharge plans to be done with pt's dtr and asked she be called or that discussion be done with her      MEDICATIONS  (STANDING):  aspirin  chewable 81 milliGRAM(s) Oral daily  atorvastatin 40 milliGRAM(s) Oral at bedtime  buPROPion XL . 150 milliGRAM(s) Oral daily  dextrose 5%. 1000 milliLiter(s) (50 mL/Hr) IV Continuous <Continuous>  dextrose 50% Injectable 12.5 Gram(s) IV Push once  dextrose 50% Injectable 25 Gram(s) IV Push once  dextrose 50% Injectable 25 Gram(s) IV Push once  enoxaparin Injectable 40 milliGRAM(s) SubCutaneous daily  insulin glargine Injectable (LANTUS) 10 Unit(s) SubCutaneous every morning  insulin lispro (HumaLOG) corrective regimen sliding scale   SubCutaneous three times a day before meals  insulin lispro (HumaLOG) corrective regimen sliding scale   SubCutaneous at bedtime  lisinopril 5 milliGRAM(s) Oral daily  melatonin 3 milliGRAM(s) Oral at bedtime  memantine 5 milliGRAM(s) Oral two times a day  sodium chloride 0.9%. 1000 milliLiter(s) (100 mL/Hr) IV Continuous <Continuous>    MEDICATIONS  (PRN):  dextrose 40% Gel 15 Gram(s) Oral once PRN Blood Glucose LESS THAN 70 milliGRAM(s)/deciliter  glucagon  Injectable 1 milliGRAM(s) IntraMuscular once PRN Glucose LESS THAN 70 milligrams/deciliter      VITALS:  Vital Signs Last 24 Hrs  T(C): 36.4 (26 Aug 2020 16:30), Max: 37.4 (26 Aug 2020 05:14)  T(F): 97.5 (26 Aug 2020 16:30), Max: 99.3 (26 Aug 2020 05:14)  HR: 77 (26 Aug 2020 16:30) (65 - 82)  BP: 164/60 (26 Aug 2020 16:30) (102/56 - 164/60)  BP(mean): --  RR: 18 (26 Aug 2020 16:30) (16 - 18)  SpO2: 97% (26 Aug 2020 16:30) (96% - 99%)    PHYSICAL EXAM:    HEENT: AT/NC, pupils round equal, nonicteric, no exudates, no oral thrush    NECK:   supple    CV:  +S1, +S2, regular    RESP:   lungs clear to auscultation bilaterally, no wheezing, rales, rhonchi, good air entry bilaterally    BREAST:  not examined    GI:  abdomen soft, non-tender, non-distended, normal BS, no bruits, no palpable masses    RECTAL:  not examined    MSK:   normal muscle tone, no atrophy    EXT:   no clubbing, no cyanosis, no edema, no calf pain, swelling or erythema    NEURO:  AAOX1, stays quiet mostly, makes needs known in [place] by yelling (ask for bread, water in [place]), follows some simple commands, able to move extremities spontaneously,     SKIN:  no ulcers    LABS:                       11.8   6.28  )-----------( 349      ( 26 Aug 2020 06:07 )             36.8     08-26    135  |  101  |  22<H>  ----------------------------<  313<H>  4.0   |  31  |  1.14    Ca    8.8      26 Aug 2020 06:07  Phos  2.7     08-26  Mg     2.1     08-26    TPro  6.9  /  Alb  3.2<L>  /  TBili  0.4  /  DBili  x   /  AST  15  /  ALT  12  /  AlkPhos  107  08-26    CARDIAC MARKERS ( 26 Aug 2020 06:07 )  x     / x     / 48 U/L / x     / x      CARDIAC MARKERS ( 26 Aug 2020 00:30 )  <0.015 ng/mL / x     / x     / x     / x        LIVER FUNCTIONS - ( 26 Aug 2020 00:30 )  Alb: 3.2 g/dL / Pro: 6.9 g/dL / ALK PHOS: 107 U/L / ALT: 12 U/L DA / AST: 15 U/L / GGT: x           Urinalysis Basic - ( 26 Aug 2020 01:35 )    Color: Yellow / Appearance: Clear / S.010 / pH: x  Gluc: x / Ketone: Negative  / Bili: Negative / Urobili: Negative   Blood: x / Protein: 15 / Nitrite: Negative   Leuk Esterase: Negative / RBC: 25-50 /HPF / WBC 0-2 /HPF   Sq Epi: x / Non Sq Epi: Few /HPF / Bacteria: Trace /HPF    Blood, Urine: Moderate ( @ 01:35)      ASSESSMENT   1. Acute on chronic cognitive dysfunction likely 2/2 vascular dementia progression   2. Failure to thrive in adult likely due to advance dementia with decrease po intake  3. CAD  4. Hypertensive heart disease  5. T2DM poorly controlled with A1c 9.2  6. severe circadian rhythm disturbance  7. Vascular Dementia with behavioral symptoms  8. Bipolar disorder    PLAN  CTH  no acute finding  TSH wnl (2.50)  CE's negative  UA +glucose otherwise unimpressive for infectious process  she passed dysphagia screen in ED  tolerated meals well in EDHL w/o cough or difficulty swallowing  chest pain free  fingerstick AC/HS with ISS  BP controlled  con't home dose ACE-I  vitals per protocol   con't asa/statin    con't melatonin, bupropion , memantine  con't Lantus at bedtime and mild HSS   DVT Prophylactic measure with lovenox 40mg qd  SW consulted for post discharge needs  Spoke with Ivania Graf via telephone [phone] who verbalized that she does NOT want pt to go to rehab, she stated that she wants "her back on her meds at home and for her to take meds again".  She reports that pt's hasn't been consistent in taking her meds because she hasn't been eating or drinking which is the means by which she tends to give pt her meds (she puts pt's meds in her food).  nutrition consult  PT consult    Goals of care d/w Dtjessa Graf (HCP)  DNI/DNR/ no surgery  MOLST form filled out via telephone consent - [phone]

## 2020-08-26 NOTE — PATIENT PROFILE ADULT - VISION (WITH CORRECTIVE LENSES IF THE PATIENT USUALLY WEARS THEM):
Partially impaired: cannot see medication labels or newsprint, but can see obstacles in path, and the surrounding layout; can count fingers at arm's length legally blind/Severely impaired: cannot locate objects without hearing or touching them or patient nonresponsive.

## 2020-08-26 NOTE — H&P ADULT - NSICDXPASTMEDICALHX_GEN_ALL_CORE_FT
PAST MEDICAL HISTORY:  Blindness     CAD (coronary artery disease)     Dementia     Depression     Diabetes     Dyslipidemia     History of hypertension

## 2020-08-26 NOTE — H&P ADULT - PROBLEM SELECTOR PLAN 1
Acute on chronic cognitive dysfunction likely 2/2 underlying dementia progression   will evaluate Metabolic (h/o DM ) vs Toxic causes , less likely infectious   CTH  unremarkable   EEG  UA, Utox, CXR, BCx, CBC, CMP, Coag, Mag, phos, syphillis screen  NH3, TSH, Folat, B12, lactate, ck  []medication induced...any psych h/o or psych meds Acute on chronic cognitive dysfunction likely 2/2 underlying dementia progression   will evaluate Metabolic (h/o DM ) vs Toxic causes , less likely infectious   Patient had sun downing on ED  CTH  unremarkable   EEG  UA, Utox, CXR, BCx, CBC, CMP, Coag, Mag, phos, syphillis screen  NH3, TSH, Folat, B12, lactate, ck  psych history :patient with dat Acute on chronic cognitive dysfunction likely 2/2 underlying dementia progression   will evaluate Metabolic (h/o DM ) vs Toxic causes , less likely infectious   Patient had sun downing on ED  CTH  unremarkable   EEG  UA, Utox, CXR, BCx, CBC, CMP, Coag, Mag, phos, syphillis screen  NH3, TSH, Folat, B12, lactate, ck  psych history :patient with h/o severe circadian rhythm disturbance, vascular dementia   will continue with home dose melatonin, bupropion , memantine Acute on chronic cognitive dysfunction likely 2/2 underlying dementia progression   will evaluate Metabolic (h/o DM ) vs Toxic causes , less likely infectious   Patient had sun downing on ED  CTH  unremarkable   EEG  UA, Utox, CXR, BCx, CBC, CMP, Coag, Mag, phos, syphillis screen  NH3, TSH, Folat, B12, lactate, ck  psych history :patient with h/o severe circadian rhythm disturbance, vascular dementia   will continue with home dose melatonin, bupropion , memantine   SW consulted for placement

## 2020-08-26 NOTE — H&P ADULT - ATTENDING COMMENTS
Pt seen and examined.  Case discussed with MAR  75 year old pt well known to our service with multiple admissions over the last 4 years related to her PMH of DM2, bipolar affective disorder, vascular dementia among others.  She was brought in by daughter today on account of progressive debility and weakness related to decreased eating and drinking as well as increased agitation from dementia. Daughter unable to provide ongoing  adequate care as she resumes work.  Otherwise no other issues of note per daughter.      Vital Signs Last 24 Hrs  T(C): 36.5 (25 Aug 2020 22:10), Max: 36.5 (25 Aug 2020 22:10)  T(F): 97.7 (25 Aug 2020 22:10), Max: 97.7 (25 Aug 2020 22:10)  HR: 82 (25 Aug 2020 22:10) (82 - 82)  BP: 110/65 (25 Aug 2020 22:10) (110/65 - 110/65)  RR: 17 (25 Aug 2020 22:10) (17 - 17)  SpO2: 96% (25 Aug 2020 22:10) (96% - 96%)    Labs                        12.8   5.81  )-----------( 328      ( 26 Aug 2020 00:30 )             39.3     08-    135  |  98  |  21<H>  ----------------------------<  291<H>  3.6   |  29  |  1.16    Ca    8.8      26 Aug 2020 00:30  Mg     2.1     -  TPro  6.9  /  Alb  3.2<L>  /  TBili  0.4  /  DBili  x   /  AST  15  /  ALT  12  /  AlkPhos  107  08-    TSH - 1.13  Trop - -ve    Urinalysis Basic - ( 26 Aug 2020 01:35 )    Color: Yellow / Appearance: Clear / S.010 / pH: x  Gluc: x / Ketone: Negative  / Bili: Negative / Urobili: Negative   Blood: x / Protein: 15 / Nitrite: Negative   Leuk Esterase: Negative / RBC: 25-50 /HPF / WBC 0-2 /HPF   Sq Epi: x / Non Sq Epi: Few /HPF / Bacteria: Trace /HPF    CT head /c-spine -ve Pt seen and examined.  Case discussed with MAR  75 year old pt well known to our service with multiple admissions over the last 4 years related to her PMH of DM2, bipolar affective disorder, vascular dementia among others.  She was brought in by daughter today on account of progressive debility and weakness related to decreased eating and drinking as well as increased agitation from dementia. Daughter unable to provide ongoing  adequate care as she resumes work.  Otherwise no other issues of note per daughter.    Vital Signs Last 24 Hrs  T(C): 36.5 (25 Aug 2020 22:10), Max: 36.5 (25 Aug 2020 22:10)  T(F): 97.7 (25 Aug 2020 22:10), Max: 97.7 (25 Aug 2020 22:10)  HR: 82 (25 Aug 2020 22:10) (82 - 82)  BP: 110/65 (25 Aug 2020 22:10) (110/65 - 110/65)  RR: 17 (25 Aug 2020 22:10) (17 - 17)  SpO2: 96% (25 Aug 2020 22:10) (96% - 96%)    Labs                        12.8   5.81  )-----------( 328      ( 26 Aug 2020 00:30 )             39.3     08-    135  |  98  |  21<H>  ----------------------------<  291<H>  3.6   |  29  |  1.16    Ca    8.8      26 Aug 2020 00:30  Mg     2.1       TPro  6.9  /  Alb  3.2<L>  /  TBili  0.4  /  DBili  x   /  AST  15  /  ALT  12  /  AlkPhos  107      TSH - 1.13  Trop - -ve    Urinalysis Basic - ( 26 Aug 2020 01:35 )    Color: Yellow / Appearance: Clear / S.010 / pH: x  Gluc: x / Ketone: Negative  / Bili: Negative / Urobili: Negative   Blood: x / Protein: 15 / Nitrite: Negative   Leuk Esterase: Negative / RBC: 25-50 /HPF / WBC 0-2 /HPF   Sq Epi: x / Non Sq Epi: Few /HPF / Bacteria: Trace /HPF    CT head /c-spine -ve    Impression  75 year old woman with PMH as detailed above including vascular dementia with behavioral problems, Bipolar affective disorder- non compliant with meds, DM2 with microvascular complication with progressively worsening agitation at home, refusal of medications and food. Pt brought in by daughter as she is unable to adequately care for her while away from home working.    A/P  1. Worsening agitation with vascular dementia  PT received haldol in the ED.  Will place on Seroquel daily with prn haldol with/without ativan with breakthrough  reorientation before chemical or physical restraints.  Fall risk protocol.  2. Failure to thrive with decreased appetite and intake  Nutrition consult  3. Dehydration   IVF and oral  hydration  4. CAD - hx of remote PCI- no intervention  5. DM 2 with mild hypoglycemia ; RISS  6. Social work consult for possible placement  7. Palliative care consult - determine if pt qualifies for hospice a Pt seen and examined.  Case discussed with MAR  75 year old pt well known to our service with multiple admissions over the last 4 years related to her PMH of DM2, bipolar affective disorder, vascular dementia among others.  She was brought in by daughter today on account of progressive debility and weakness related to decreased eating and drinking as well as increased agitation from dementia. Daughter unable to provide ongoing  adequate care as she resumes work.  Otherwise no other issues of note per daughter.    Vital Signs Last 24 Hrs  T(C): 36.5 (25 Aug 2020 22:10), Max: 36.5 (25 Aug 2020 22:10)  T(F): 97.7 (25 Aug 2020 22:10), Max: 97.7 (25 Aug 2020 22:10)  HR: 82 (25 Aug 2020 22:10) (82 - 82)  BP: 110/65 (25 Aug 2020 22:10) (110/65 - 110/65)  RR: 17 (25 Aug 2020 22:10) (17 - 17)  SpO2: 96% (25 Aug 2020 22:10) (96% - 96%)    Pt seen and examined  She is confused, agitated and trying to get out of bed  Otherwise NAD AAO X 2  RRR S1S2 only, CTA B/L  Soft no distension  No pedal edema.    Labs                        12.8   5.81  )-----------( 328      ( 26 Aug 2020 00:30 )             39.3         135  |  98  |  21<H>  ----------------------------<  291<H>  3.6   |  29  |  1.16    Ca    8.8      26 Aug 2020 00:30  Mg     2.1       TPro  6.9  /  Alb  3.2<L>  /  TBili  0.4  /  DBili  x   /  AST  15  /  ALT  12  /  AlkPhos  107      TSH - 1.13  Trop - -ve    Urinalysis Basic - ( 26 Aug 2020 01:35 )    Color: Yellow / Appearance: Clear / S.010 / pH: x  Gluc: x / Ketone: Negative  / Bili: Negative / Urobili: Negative   Blood: x / Protein: 15 / Nitrite: Negative   Leuk Esterase: Negative / RBC: 25-50 /HPF / WBC 0-2 /HPF   Sq Epi: x / Non Sq Epi: Few /HPF / Bacteria: Trace /HPF    CT head /c-spine -ve    Impression  75 year old woman with PMH as detailed above including vascular dementia with behavioral problems, Bipolar affective disorder- non compliant with meds, DM2 with microvascular complication with progressively worsening agitation at home, refusal of medications and food. Pt brought in by daughter as she is unable to adequately care for her while away from home working.    A/P  1. Worsening agitation with vascular dementia  PT received haldol in the ED.  Will place on Seroquel daily with prn haldol with/without ativan with breakthrough  reorientation before chemical or physical restraints.  Fall risk protocol.  2. Failure to thrive with decreased appetite and intake  Nutrition consult  3. Dehydration   IVF and oral  hydration  4. CAD - hx of remote PCI- no intervention  5. DM 2 with mild hypoglycemia ; RISS  6. Social work consult for possible placement  7. Palliative care consult - determine if pt qualifies for hospice a Pt seen and examined.  Case discussed with MAR  75 year old pt well known to our service with multiple admissions over the last 4 years related to her PMH of DM2, bipolar affective disorder, vascular dementia among others.  She was brought in by daughter today on account of progressive debility and weakness related to decreased eating and drinking as well as increased agitation from dementia. Daughter unable to provide ongoing  adequate care as she resumes work.  Otherwise no other issues of note per daughter.    Vital Signs Last 24 Hrs  T(C): 36.5 (25 Aug 2020 22:10), Max: 36.5 (25 Aug 2020 22:10)  T(F): 97.7 (25 Aug 2020 22:10), Max: 97.7 (25 Aug 2020 22:10)  HR: 82 (25 Aug 2020 22:10) (82 - 82)  BP: 110/65 (25 Aug 2020 22:10) (110/65 - 110/65)  RR: 17 (25 Aug 2020 22:10) (17 - 17)  SpO2: 96% (25 Aug 2020 22:10) (96% - 96%)    Pt seen and examined  She is confused, agitated and trying to get out of bed  Otherwise NAD AAO X 2  RRR S1S2 only, CTA B/L  Soft no distension  No pedal edema.    Labs                        12.8   5.81  )-----------( 328      ( 26 Aug 2020 00:30 )             39.3         135  |  98  |  21<H>  ----------------------------<  291<H>  3.6   |  29  |  1.16    Ca    8.8      26 Aug 2020 00:30  Mg     2.1       TPro  6.9  /  Alb  3.2<L>  /  TBili  0.4  /  DBili  x   /  AST  15  /  ALT  12  /  AlkPhos  107      TSH - 1.13  Trop - -ve    Urinalysis Basic - ( 26 Aug 2020 01:35 )    Color: Yellow / Appearance: Clear / S.010 / pH: x  Gluc: x / Ketone: Negative  / Bili: Negative / Urobili: Negative   Blood: x / Protein: 15 / Nitrite: Negative   Leuk Esterase: Negative / RBC: 25-50 /HPF / WBC 0-2 /HPF   Sq Epi: x / Non Sq Epi: Few /HPF / Bacteria: Trace /HPF    CT head /c-spine -ve    Impression  75 year old woman with PMH as detailed above including vascular dementia with behavioral problems, Bipolar affective disorder- non compliant with meds, DM2 with microvascular complication with progressively worsening agitation at home, refusal of medications and food. Pt brought in by daughter as she is unable to adequately care for her while away from home working.    A/P  1. Worsening agitation with vascular dementia  PT received haldol in the ED.  Will place on Seroquel daily with prn haldol with/without ativan with breakthrough  reorientation before chemical or physical restraints.  Fall risk protocol.  2. Failure to thrive with decreased appetite and intake  Nutrition consult  3. Dehydration   IVF and oral  hydration  4. CAD - hx of remote PCI- no intervention  5. DM 2 with mild hypoglycemia ; RISS  6. Social work consult for possible placement since family might be unable to care for her  7. Palliative care consult - determine if pt qualifies for hospice.

## 2020-08-27 DIAGNOSIS — F03.90 UNSPECIFIED DEMENTIA WITHOUT BEHAVIORAL DISTURBANCE: ICD-10-CM

## 2020-08-27 DIAGNOSIS — Z51.5 ENCOUNTER FOR PALLIATIVE CARE: ICD-10-CM

## 2020-08-27 DIAGNOSIS — E44.0 MODERATE PROTEIN-CALORIE MALNUTRITION: ICD-10-CM

## 2020-08-27 DIAGNOSIS — R53.2 FUNCTIONAL QUADRIPLEGIA: ICD-10-CM

## 2020-08-27 LAB
CULTURE RESULTS: SIGNIFICANT CHANGE UP
GLUCOSE BLDC GLUCOMTR-MCNC: 212 MG/DL — HIGH (ref 70–99)
GLUCOSE BLDC GLUCOMTR-MCNC: 277 MG/DL — HIGH (ref 70–99)
GLUCOSE BLDC GLUCOMTR-MCNC: 382 MG/DL — HIGH (ref 70–99)
SPECIMEN SOURCE: SIGNIFICANT CHANGE UP

## 2020-08-27 PROCEDURE — 99223 1ST HOSP IP/OBS HIGH 75: CPT

## 2020-08-27 PROCEDURE — 99233 SBSQ HOSP IP/OBS HIGH 50: CPT | Mod: GC

## 2020-08-27 RX ORDER — MIRTAZAPINE 45 MG/1
1 TABLET, ORALLY DISINTEGRATING ORAL
Qty: 30 | Refills: 0
Start: 2020-08-27 | End: 2020-09-25

## 2020-08-27 RX ORDER — INSULIN GLARGINE 100 [IU]/ML
5 INJECTION, SOLUTION SUBCUTANEOUS ONCE
Refills: 0 | Status: COMPLETED | OUTPATIENT
Start: 2020-08-27 | End: 2020-08-27

## 2020-08-27 RX ORDER — LISINOPRIL 2.5 MG/1
1 TABLET ORAL
Qty: 0 | Refills: 0 | DISCHARGE
Start: 2020-08-27

## 2020-08-27 RX ORDER — MIRTAZAPINE 45 MG/1
15 TABLET, ORALLY DISINTEGRATING ORAL DAILY
Refills: 0 | Status: DISCONTINUED | OUTPATIENT
Start: 2020-08-27 | End: 2020-08-27

## 2020-08-27 RX ORDER — MIRTAZAPINE 45 MG/1
15 TABLET, ORALLY DISINTEGRATING ORAL AT BEDTIME
Refills: 0 | Status: DISCONTINUED | OUTPATIENT
Start: 2020-08-27 | End: 2020-08-31

## 2020-08-27 RX ORDER — INSULIN GLARGINE 100 [IU]/ML
15 INJECTION, SOLUTION SUBCUTANEOUS EVERY MORNING
Refills: 0 | Status: DISCONTINUED | OUTPATIENT
Start: 2020-08-27 | End: 2020-08-28

## 2020-08-27 RX ADMIN — MIRTAZAPINE 15 MILLIGRAM(S): 45 TABLET, ORALLY DISINTEGRATING ORAL at 22:17

## 2020-08-27 RX ADMIN — ENOXAPARIN SODIUM 40 MILLIGRAM(S): 100 INJECTION SUBCUTANEOUS at 12:16

## 2020-08-27 RX ADMIN — LISINOPRIL 5 MILLIGRAM(S): 2.5 TABLET ORAL at 05:38

## 2020-08-27 RX ADMIN — Medication 3 MILLIGRAM(S): at 22:17

## 2020-08-27 RX ADMIN — ATORVASTATIN CALCIUM 40 MILLIGRAM(S): 80 TABLET, FILM COATED ORAL at 22:17

## 2020-08-27 RX ADMIN — INSULIN GLARGINE 5 UNIT(S): 100 INJECTION, SOLUTION SUBCUTANEOUS at 10:44

## 2020-08-27 RX ADMIN — MIRTAZAPINE 15 MILLIGRAM(S): 45 TABLET, ORALLY DISINTEGRATING ORAL at 12:16

## 2020-08-27 RX ADMIN — Medication 5: at 12:15

## 2020-08-27 RX ADMIN — MEMANTINE HYDROCHLORIDE 5 MILLIGRAM(S): 10 TABLET ORAL at 05:38

## 2020-08-27 RX ADMIN — Medication 81 MILLIGRAM(S): at 12:15

## 2020-08-27 RX ADMIN — INSULIN GLARGINE 10 UNIT(S): 100 INJECTION, SOLUTION SUBCUTANEOUS at 08:03

## 2020-08-27 RX ADMIN — Medication 3: at 17:06

## 2020-08-27 NOTE — CONSULT NOTE ADULT - PROBLEM SELECTOR RECOMMENDATION 2
2/2 advanced dementia, poor po intake. SLP amarjit noted, on dysphagia diet. Nutrition following. At risk for skin failure. Family does not want feeding tubes, continue comfort feeds as tolerated. 2/2 advanced dementia, poor po intake. SLP amarjit noted, on dysphagia diet. Nutrition following. At risk for skin failure. Family does not want feeding tubes, continue comfort feeds as tolerated. On remeron

## 2020-08-27 NOTE — DISCHARGE NOTE PROVIDER - NSDCMRMEDTOKEN_GEN_ALL_CORE_FT
alcohol swabs : Apply topically to affected area 4 times a day   DX E11.65  aspirin 81 mg oral tablet, chewable: 1 tab(s) orally once a day  atorvastatin 40 mg oral tablet: 1 tab(s) orally once a day (at bedtime)  Basaglar KwikPen 100 units/mL subcutaneous solution: 15 unit(s) subcutaneous once a day (at bedtime  DX E11.65   buPROPion 75 mg oral tablet: 2 tab(s) orally once a day in the [facility]   glucometer (per patient&#x27;s insurance): Test blood sugars four times a day. Dispense #1 glucometer.  DX E11.65  glucose tablets: Follow instructions on bottle when sugar is low.  DX E11.65  HumaLOG KwikPen 100 units/mL injectable solution: 7 unit(s) subcutaneously 3 times a day (before meals)   DX E11.65  Insulin Pen Needles, 4mm: 1 application subcutaneously 4 times a day. ** Use with insulin pen **   DX E11.65  lancets: 1 application subcutaneously 4 times a day   DX E11.65  Lantus Solostar Pen 100 units/mL subcutaneous solution: 15 unit(s) subcutaneous once a day (at bedtime)   lisinopril 5 mg oral tablet: 1 tab(s) orally once a day  Melatonin 1 mg/mL oral solution: 3 milliliter(s) orally once a day (at bedtime)   memantine 5 mg oral tablet: 1 tab(s) orally 2 times a day  test strips (per patient&#x27;s insurance): 1 application subcutaneously 4 times a day. ** Compatible with patient&#x27;s glucometer **  DX E11.65 alcohol swabs : Apply topically to affected area 4 times a day   DX E11.65  aspirin 81 mg oral tablet, chewable: 1 tab(s) orally once a day  atorvastatin 40 mg oral tablet: 1 tab(s) orally once a day (at bedtime)  Basaglar KwikPen 100 units/mL subcutaneous solution: 15 unit(s) subcutaneous once a day (at bedtime  DX E11.65   buPROPion 75 mg oral tablet: 2 tab(s) orally once a day in the [facility]   glucometer (per patient&#x27;s insurance): Test blood sugars four times a day. Dispense #1 glucometer.  DX E11.65  glucose tablets: Follow instructions on bottle when sugar is low.  DX E11.65  HumaLOG KwikPen 100 units/mL injectable solution: 7 unit(s) subcutaneously 3 times a day (before meals)   DX E11.65  Insulin Pen Needles, 4mm: 1 application subcutaneously 4 times a day. ** Use with insulin pen **   DX E11.65  lancets: 1 application subcutaneously 4 times a day   DX E11.65  Lantus Solostar Pen 100 units/mL subcutaneous solution: 15 unit(s) subcutaneous once a day (at bedtime)   lisinopril 5 mg oral tablet: 1 tab(s) orally once a day  Melatonin 1 mg/mL oral solution: 3 milliliter(s) orally once a day (at bedtime)   memantine 5 mg oral tablet: 1 tab(s) orally 2 times a day  test strips (per patient&#x27;s insurance): 1 application subcutaneously 4 times a day. ** Compatible with patient&#x27;s glucometer **  DX E11.65 alcohol swabs : Apply topically to affected area 4 times a day   DX E11.65  aspirin 81 mg oral tablet, chewable: 1 tab(s) orally once a day  atorvastatin 40 mg oral tablet: 1 tab(s) orally once a day (at bedtime)  glucometer (per patient&#x27;s insurance): Test blood sugars four times a day. Dispense #1 glucometer.  DX E11.65  glucose tablets: Follow instructions on bottle when sugar is low.  DX E11.65  HumaLOG KwikPen 100 units/mL injectable solution: 5 unit(s) subcutaneously 3 times a day (before meals)  DX E11.65   Insulin Pen Needles, 4mm: 1 application subcutaneously 4 times a day. ** Use with insulin pen **   DX E11.65  lancets: 1 application subcutaneously 4 times a day   DX E11.65  Lantus Solostar Pen 100 units/mL subcutaneous solution: 15 unit(s) subcutaneous once a day (at bedtime)   lisinopril 5 mg oral tablet: 1 tab(s) orally once a day  Melatonin 1 mg/mL oral solution: 3 milliliter(s) orally once a day (at bedtime)   mirtazapine 15 mg oral tablet: 1 tab(s) orally once a day (at bedtime)  test strips (per patient&#x27;s insurance): 1 application subcutaneously 4 times a day. ** Compatible with patient&#x27;s glucometer **  DX E11.65 alcohol swabs : Apply topically to affected area 4 times a day   DX E11.65  aspirin 81 mg oral tablet, chewable: 1 tab(s) orally once a day  atorvastatin 40 mg oral tablet: 1 tab(s) orally once a day (at bedtime)  glucometer (per patient&#x27;s insurance): Test blood sugars four times a day. Dispense #1 glucometer.  DX E11.65  glucose tablets: Follow instructions on bottle when sugar is low.  DX E11.65  HumaLOG KwikPen 100 units/mL injectable solution: 5 unit(s) subcutaneously 3 times a day (before meals)  DX E11.65   Insulin Pen Needles, 4mm: 1 application subcutaneously 4 times a day. ** Use with insulin pen **   DX E11.65  lancets: 1 application subcutaneously 4 times a day   DX E11.65  Lantus Solostar Pen 100 units/mL subcutaneous solution: 15 unit(s) subcutaneous once a day (at bedtime)   lisinopril 5 mg oral tablet: 1 tab(s) orally once a day  Melatonin 1 mg/mL oral solution: 3 milliliter(s) orally once a day (at bedtime)   mirtazapine 15 mg oral tablet: 1 tab(s) orally once a day (at bedtime)  test strips (per patient&#x27;s insurance): 1 application subcutaneously 4 times a day. ** Compatible with patient&#x27;s glucometer **  DX E11.65  ZyPREXA 2.5 mg oral tablet: 1 tab(s) orally once a day at 7 pm alcohol swabs : Apply topically to affected area 4 times a day   DX E11.65  aspirin 81 mg oral tablet, chewable: 1 tab(s) orally once a day  atorvastatin 40 mg oral tablet: 1 tab(s) orally once a day (at bedtime)  glucometer (per patient&#x27;s insurance): Test blood sugars four times a day. Dispense #1 glucometer.  DX E11.65  glucose tablets: Follow instructions on bottle when sugar is low.  DX E11.65  HumaLOG KwikPen 100 units/mL injectable solution: 3 unit(s) subcutaneously 3 times a day (before meals)  DX E11.65   Insulin Pen Needles, 4mm: 1 application subcutaneously 4 times a day. ** Use with insulin pen **   DX E11.65  lancets: 1 application subcutaneously 4 times a day   DX E11.65  Lantus Solostar Pen 100 units/mL subcutaneous solution: 15 unit(s) subcutaneous once a day (at bedtime)   lisinopril 5 mg oral tablet: 1 tab(s) orally once a day  Melatonin 1 mg/mL oral solution: 3 milliliter(s) orally once a day (at bedtime)   mirtazapine 15 mg oral tablet: 1 tab(s) orally once a day (at bedtime)  test strips (per patient&#x27;s insurance): 1 application subcutaneously 4 times a day. ** Compatible with patient&#x27;s glucometer **  DX E11.65  ZyPREXA 2.5 mg oral tablet: 1 tab(s) orally once a day at 7 pm

## 2020-08-27 NOTE — CHART NOTE - NSCHARTNOTEFT_GEN_A_CORE
Upon Nutritional Assessment by the Registered Dietitian your patient was determined to meet criteria / has evidence of the following diagnosis/diagnoses:          [ ]  Mild Protein Calorie Malnutrition        [ ]  Moderate Protein Calorie Malnutrition        [X ] Severe Protein Calorie Malnutrition        [ ] Unspecified Protein Calorie Malnutrition        [X ] Underweight / BMI <19        [ ] Morbid Obesity / BMI > 40      Findings as based on:  •  Comprehensive nutrition assessment and consultation  •  Calorie counts (nutrient intake analysis)  •  Food acceptance and intake status from observations by staff  •  Follow up  •  Patient education  •  Intervention secondary to interdisciplinary rounds  •   concerns      Treatment:    The following diet has been recommended:      PROVIDER Section:     By signing this assessment you are acknowledging and agree with the diagnosis/diagnoses assigned by the Registered Dietitian    Comments:  Add Glucerna Shake 1can bid (440kcal, 20g protein) & MVI/minerals daily

## 2020-08-27 NOTE — CONSULT NOTE ADULT - PROBLEM SELECTOR RECOMMENDATION 9
advanced, with behavioral disturbance, min verbal, nonambulatory, FAST 7c minimum, hospice eligible. Currently calm. Daughter in agreement for home hospice.   On namenda, melatonin. Consider low dose seroquel qhs prn agitation

## 2020-08-27 NOTE — DIETITIAN INITIAL EVALUATION ADULT. - OTHER INFO
Patient from home & has HHA. Visited pt. alert, agitated Patient from home & has HHA with multiple admission to [facility]. Visited pt. alert, agitated, confused at times, call daughter - Dora, reports she has been struggling with her mother taking her meds & consuming her meals for past 6 weeks at home. Stated pt. appetite has been poor, will eat well >3 days as she is very hungry then the following week pt. refuses to have her meals or fluids & medications, this has been going on for 6 wks.  Per daughter, pt. losing wt. but unable to quantify amounts? current wt. 101 Lbs & was able to perform nutrition focus physical exam, see below fields. Presently pt. consuming 50% of meals with feeding assistance, d/w PCA, encourage po intake, nutrition supplement to be added, skin intact, d/w MD/team, Palliative Care team following. Patient from home & has HHA with multiple admission to [facility]. Visited pt. alert, agitated, confused at times, call daughter - Dora, reports she has been struggling with her mother taking her meds & consuming her meals for past 6 weeks at home. Stated pt. appetite has been poor, will eat well >3 days as she is very hungry then the following week pt. refuses to have her meals or fluids & medications, this has been going on for 6 wks.  Per daughter, pt. losing wt. but unable to quantify amounts? current wt. 101 Lbs & was able to perform nutrition focus physical exam, see below fields. Presently pt. consuming 50% of meals with feeding assistance, d/w PCA, has no dentures, encourage po intake, nutrition supplement to be added, skin intact, d/w MD/team, Palliative Care team following.

## 2020-08-27 NOTE — DIETITIAN INITIAL EVALUATION ADULT. - PROBLEM SELECTOR PLAN 3
Patient with h/o Cad and stent X3 on aspirin, statin and Acei  will continue with home meds    f/u lipid panel

## 2020-08-27 NOTE — DISCHARGE NOTE PROVIDER - NSDCCPCAREPLAN_GEN_ALL_CORE_FT
PRINCIPAL DISCHARGE DIAGNOSIS  Diagnosis: Advanced dementia  Assessment and Plan of Treatment: You have advanced dementia, you were on multiple medications, we have adjusted your dementia medications.      SECONDARY DISCHARGE DIAGNOSES  Diagnosis: CAD (coronary artery disease)  Assessment and Plan of Treatment: Please continue with your home medications    Diagnosis: HTN (hypertension)  Assessment and Plan of Treatment: Please continue with your home medications    Diagnosis: Diabetes  Assessment and Plan of Treatment: You have insulin dependent diabetes. You are lantus 15 units every morning. You are advised to take **** PRINCIPAL DISCHARGE DIAGNOSIS  Diagnosis: Advanced dementia  Assessment and Plan of Treatment: You have advanced dementia, you were on multiple medications, we have adjusted your dementia medications. You have poor intake, you are started on protien supplementation. You are being discharged home with home hospice.      SECONDARY DISCHARGE DIAGNOSES  Diagnosis: Depression  Assessment and Plan of Treatment: You were found to have depression, your medications were adjusted and you were started on mirtazepine 15mg every night. You are advised to continue your medications and follow up with your pcp in 2 weeks of your discharge.    Diagnosis: CAD (coronary artery disease)  Assessment and Plan of Treatment: Please continue with your home medications    Diagnosis: HTN (hypertension)  Assessment and Plan of Treatment: Please continue with your home medications    Diagnosis: Diabetes  Assessment and Plan of Treatment: You have insulin dependent diabetes. You are lantus 15 units every morning. You are advised to take **** PRINCIPAL DISCHARGE DIAGNOSIS  Diagnosis: Advanced dementia  Assessment and Plan of Treatment: You have advanced dementia, you were on multiple medications, we have adjusted your dementia medications. You have poor intake, you are started on protien supplementation. We have held your dementia medications and you are advised to take mirtazapine daily at bed time.You are being discharged home with home hospice.      SECONDARY DISCHARGE DIAGNOSES  Diagnosis: Depression  Assessment and Plan of Treatment: You were found to have depression, your medications were adjusted and you were started on mirtazepine 15mg every night. You are advised to continue your medications and follow up with your pcp in 2 weeks of your discharge.    Diagnosis: CAD (coronary artery disease)  Assessment and Plan of Treatment: Please continue with your home medications    Diagnosis: HTN (hypertension)  Assessment and Plan of Treatment: Please continue with your home medications    Diagnosis: Diabetes  Assessment and Plan of Treatment: You have insulin dependent diabetes. You are lantus 15 units every morning and humalog 5 units 3 times a day before meals. Monitor your finger sticks and maintain a record of your readings and follow up with your pcp in 2 weeks of your discharge PRINCIPAL DISCHARGE DIAGNOSIS  Diagnosis: Advanced dementia  Assessment and Plan of Treatment: You have advanced dementia, you were on multiple medications, we have adjusted your dementia medications. You have poor intake, you are started on protien supplementation. We have held your dementia medications and you are advised to take mirtazapine daily at bed time.You are being discharged home with home hospice.      SECONDARY DISCHARGE DIAGNOSES  Diagnosis: Depression  Assessment and Plan of Treatment: You were found to have depression, your medications were adjusted and you were started on mirtazepine 15mg every night. You were seen by psychiatrist and were started on zyprexa 2.5mg once daily at 7pm as advised. You are advised to continue your medications and follow up with your pcp in 2 weeks of your discharge.    Diagnosis: CAD (coronary artery disease)  Assessment and Plan of Treatment: Please continue with your home medications    Diagnosis: HTN (hypertension)  Assessment and Plan of Treatment: Please continue with your home medications    Diagnosis: Diabetes  Assessment and Plan of Treatment: You have insulin dependent diabetes. You are lantus 15 units every morning and humalog 5 units 3 times a day before meals. Monitor your finger sticks and maintain a record of your readings and follow up with your pcp in 2 weeks of your discharge PRINCIPAL DISCHARGE DIAGNOSIS  Diagnosis: Major depression  Assessment and Plan of Treatment: You presented to hospital with agitation, confusion and refusing to eat. This was likely due to underlyign Major Depression as you feel frustrated due to your ability not to see and do not want to be a burden on others. You have a history of Depression but not on medications regularly. You was started on Mirtazapine to improve depression along with improving appetite and sleep. You have significantly imrpoved and eating better.   Also you was taken off Aricept and Namenda which likely was contributing to confusion and intermittent agitation and Psychosis which has resolved.   Psychiatrist was also consulted agreed with Mirtazapine 15 mg at bedtime as well as    recommended starting Zyprexa 2.5 mg to potentiate effect of Mirtazapine and prevent Psychosisd.  Follow up with your PCP/ Hospice Care team      SECONDARY DISCHARGE DIAGNOSES  Diagnosis: Advanced dementia  Assessment and Plan of Treatment: You have aunderlying dementia, you were on multiple medications, we have adjusted your dementia medications and taken off Aricept and Namenda with good control of agitation. You have poor intake, you are started on protien supplementation. We have held your dementia medications and you are advised to take mirtazapine daily at bed time.You are being discharged home with home hospice. as requested by your daughter palliative evalauation.    Diagnosis: CAD (coronary artery disease)  Assessment and Plan of Treatment: Please continue with your home medications    Diagnosis: Diabetes  Assessment and Plan of Treatment: You have insulin dependent diabetes. You are on lantus insulin. Please continue with 16 units every morning and humalog  5 units 3 times a day AFTER meals. iNCREASE TO 20 UNITS LANTUS IF SUGARS PERSISTENTLY MORE THAN 200S. Monitor your finger sticks and maintain a record of your readings and follow up with your PCP in 2 weeks of your discharge    Diagnosis: HTN (hypertension)  Assessment and Plan of Treatment: Please continue with your home medications PRINCIPAL DISCHARGE DIAGNOSIS  Diagnosis: Major depression  Assessment and Plan of Treatment: You presented to hospital with agitation, confusion and refusing to eat. This was likely due to underlyign Major Depression as you feel frustrated due to your ability not to see and do not want to be a burden on others. You have a history of Depression but not on medications regularly. You was started on Mirtazapine to improve depression along with improving appetite and sleep. You have significantly imrpoved and eating better.   Also you was taken off Aricept and Namenda which likely was contributing to confusion and intermittent agitation and Psychosis which has resolved.   Psychiatrist was also consulted agreed with Mirtazapine 15 mg at bedtime as well as    recommended starting Zyprexa 2.5 mg to potentiate effect of Mirtazapine and prevent Psychosisd.  Follow up with your PCP/ Hospice Care team      SECONDARY DISCHARGE DIAGNOSES  Diagnosis: Advanced dementia  Assessment and Plan of Treatment: You have aunderlying dementia, you were on multiple medications, we have adjusted your dementia medications and taken off Aricept and Namenda with good control of agitation. You have poor intake, you are started on protien supplementation. We have held your dementia medications and you are advised to take mirtazapine daily at bed time.You are being discharged home with home hospice. as requested by your daughter palliative evalauation.    Diagnosis: CAD (coronary artery disease)  Assessment and Plan of Treatment: Please continue with your home medications    Diagnosis: Diabetes  Assessment and Plan of Treatment: You have insulin dependent diabetes. You are on lantus insulin. Please continue with 16 units every morning and humalog  3 to 5 units 3 times a day AFTER meals. iNCREASE TO 20 UNITS LANTUS IF SUGARS PERSISTENTLY MORE THAN 200S. Monitor your finger sticks and maintain a record of your readings and follow up with your PCP in 2 weeks of your discharge    Diagnosis: HTN (hypertension)  Assessment and Plan of Treatment: Please continue with your home medications

## 2020-08-27 NOTE — DISCHARGE NOTE PROVIDER - HOSPITAL COURSE
79 yo F from home lives with  and zaker ambulates with walker at baseline, HHA 12 hours  PMH dementia, IDDM, HLD, CAD sp 3 stents, legally blind presented with decline in cognitive function X6 weeks and worsening agitation. On my evaluation patient is AAOX 2-3, daughter  Reported patient refused to eat ,drink and having her medications. Patient c/o new onset neck pain 6/10 , aggravates with movements. Patient states she does  not have any appetite and lost weight. Denies fever, chills, palpitation chest wendy , urinary sx, N/V/ D or any other acute complains. 79 yo F from home lives with  and zaker ambulates with walker at baseline, HHA 12 hours  PMH dementia, IDDM, HLD, CAD sp 3 stents, legally blind presented with decline in cognitive function X6 weeks and worsening agitation. On my evaluation patient is AAOX 2-3, daughter  Reported patient refused to eat ,drink and having her medications. Patient c/o new onset neck pain 6/10 , aggravates with movements. Patient states she does  not have any appetite and lost weight. Denies fever, chills, palpitation chest wendy , urinary sx, N/V/ D or any other acute complains. Pt was noted to be on multiple medications for depression, all medications for dementia and depression were discontinued and was started on mirtazapine. Pt was monitored closely, all infective workup remained negative.    Pt was seen by palliative and was made DNR/DNI with  home hospice. Pt was also seen by psy who recommended continued medical management. Nutrition was consulted for protien calorie malnutrition and poor po intake and patient was started on proteins supplementation.  Pt is stable for discharge as discussed with attending on case 76 yo F from home lives with  and zaker ambulates with walker at baseline, HHA 12 hours  PMH dementia, IDDM, HLD, CAD sp 3 stents, legally blind presented with decline in cognitive function X6 weeks and worsening agitation. On my evaluation patient is AAOX 2-3, daughter  Reported patient refused to eat ,drink and having her medications. Patient c/o new onset neck pain 6/10 , aggravates with movements. Patient states she does  not have any appetite and lost weight. Denies fever, chills, palpitation chest wendy , urinary sx, N/V/ D or any other acute complains. Pt was noted to be on multiple medications for depression, all medications for dementia and depression were discontinued and was started on mirtazapine. Pt was monitored closely, all infective workup remained negative.    Pt was seen by palliative and was made DNR/DNI with  home hospice. Pt was also seen by psy who recommended continued medical management. Nutrition was consulted for protien calorie malnutrition and poor po intake and patient was started on proteins supplementation.  Pt is stable for discharge as discussed with attending on case

## 2020-08-27 NOTE — PROGRESS NOTE ADULT - SUBJECTIVE AND OBJECTIVE BOX
PGY-1 Progress Note discussed with attending    PAGER #: [[phone]] TILL 5:00 PM  PLEASE CONTACT ON CALL TEAM:  - On Call Team (Please refer to Geeta) FROM 5:00 PM - 8:30PM  - Nightfloat Team FROM 8:30 -7:30 AM    CHIEF COMPLAINT & BRIEF HOSPITAL COURSE:  HPI:  79 yo F from home lives with  and doughter ambulates with walker at baseline, HHA 12 hours. PMH dementia, IDDM, HLD, CAD sp 3 stents, depression, legally blind presented with decline in cognitive function X6 weeks, worsening agitation, refusal to eat and failure to thrive. Reported patient refused to eat ,drink and having her medications. CT head and spine are negative for any acute pathology, they show chronic microvascular changes, fibrous dysplasia, and mild canal stenosis with multilevel foramen narrowing. Dietician evaluated the patient and recommend patient be in soft diet, with glucerna supplementation. Palliative care evaluated the patient, patient is now DNR/DNI, they family does not want feeding tubes, palliative care recommends home hospice.  was consulted.     INTERVAL HPI/OVERNIGHT EVENTS:   Patient seen and examined at bedside. She is alert and oriented, answering questions appropriately. She looks thin and frail. Patient has sad affect, refuses to get out of bed to walk or even to get on the chair. She has no problem swallowing or masticating, but is still refusing to eat. She mentions that wants to be alone, and sleep all day. She denies any complains including chest pain, SOB, cough, abdominal pain, N/V/D/C, headaches or dizziness. Patients clinical presentation of failure to thrive and refusal of food and medications seems to be precipitated by underlying depression rather than worsening dementia.     REVIEW OF SYSTEMS:  CONSTITUTIONAL: No fever, weight loss, or fatigue,   HEENT: legal blindness  RESPIRATORY: No cough, wheezing, chills or hemoptysis; No shortness of breath  CARDIOVASCULAR: No chest pain, palpitations, dizziness, or leg swelling  GASTROINTESTINAL: No abdominal pain. No nausea, vomiting, or hematemesis; No diarrhea or constipation. No melena or hematochezia.  GENITOURINARY: No dysuria or hematuria, urinary frequency  MUSCULOSKELETAL: No pain, no Limited ROM  NEUROLOGICAL: No headaches, memory loss, loss of strength, numbness, or tremors  SKIN: No itching, burning, rashes, or lesions     MEDICATIONS  (STANDING):  aspirin  chewable 81 milliGRAM(s) Oral daily  atorvastatin 40 milliGRAM(s) Oral at bedtime  dextrose 5%. 1000 milliLiter(s) (50 mL/Hr) IV Continuous <Continuous>  dextrose 50% Injectable 12.5 Gram(s) IV Push once  dextrose 50% Injectable 25 Gram(s) IV Push once  dextrose 50% Injectable 25 Gram(s) IV Push once  enoxaparin Injectable 40 milliGRAM(s) SubCutaneous daily  insulin glargine Injectable (LANTUS) 15 Unit(s) SubCutaneous every morning  insulin lispro (HumaLOG) corrective regimen sliding scale   SubCutaneous three times a day before meals  insulin lispro (HumaLOG) corrective regimen sliding scale   SubCutaneous at bedtime  lisinopril 5 milliGRAM(s) Oral daily  melatonin 3 milliGRAM(s) Oral at bedtime  mirtazapine 15 milliGRAM(s) Oral at bedtime  sodium chloride 0.9%. 1000 milliLiter(s) (100 mL/Hr) IV Continuous <Continuous>    MEDICATIONS  (PRN):  dextrose 40% Gel 15 Gram(s) Oral once PRN Blood Glucose LESS THAN 70 milliGRAM(s)/deciliter  glucagon  Injectable 1 milliGRAM(s) IntraMuscular once PRN Glucose LESS THAN 70 milligrams/deciliter      Vital Signs Last 24 Hrs  T(C): 36.3 (27 Aug 2020 04:26), Max: 36.6 (26 Aug 2020 19:16)  T(F): 97.4 (27 Aug 2020 04:26), Max: 97.9 (26 Aug 2020 19:16)  HR: 73 (27 Aug 2020 04:26) (70 - 78)  BP: 139/50 (27 Aug 2020 04:26) (139/50 - 172/56)  BP(mean): --  RR: 17 (27 Aug 2020 04:26) (16 - 18)  SpO2: 100% (27 Aug 2020 04:26) (97% - 100%)    PHYSICAL EXAMINATION:  GENERAL: NAD, frail, thin  HEAD:  Atraumatic, Normocephalic  EYES:  conjunctiva and sclera clear, keeps eyes closed most of the time.  NECK: Supple, No JVD, Normal thyroid  CHEST/LUNG: Clear to auscultation. Clear to percussion bilaterally; No rales, rhonchi, wheezing, or rubs  HEART: Regular rate and rhythm; No murmurs, rubs, or gallops  ABDOMEN: Soft, Nontender, Nondistended; Bowel sounds present  NERVOUS SYSTEM:  Alert & Oriented X3,  sad affect, motor strength and sensation intact in all extremities.  EXTREMITIES:  2+ Peripheral Pulses, No clubbing, cyanosis, or edema, full range of motion in all extremities  SKIN: warm dry                          11.8   6.28  )-----------( 349      ( 26 Aug 2020 06:07 )             36.8     08-26    135  |  101  |  22<H>  ----------------------------<  313<H>  4.0   |  31  |  1.14    Ca    8.8      26 Aug 2020 06:07  Phos  2.7     08-26  Mg     2.1     08-26    TPro  6.9  /  Alb  3.2<L>  /  TBili  0.4  /  DBili  x   /  AST  15  /  ALT  12  /  AlkPhos  107  08-26    LIVER FUNCTIONS - ( 26 Aug 2020 00:30 )  Alb: 3.2 g/dL / Pro: 6.9 g/dL / ALK PHOS: 107 U/L / ALT: 12 U/L DA / AST: 15 U/L / GGT: x           CARDIAC MARKERS ( 26 Aug 2020 06:07 )  x     / x     / 48 U/L / x     / x      CARDIAC MARKERS ( 26 Aug 2020 00:30 )  <0.015 ng/mL / x     / x     / x     / x            I&O's Summary    Urine Microscopic-Add On (NC) (08.26.20 @ 01:35)    White Blood Cell - Urine: 0-2 /HPF    Red Blood Cell - Urine: 25-50 /HPF    Epithelial Cells: Few /HPF    Bacteria: Trace /HPF    Urinalysis (08.26.20 @ 01:35)    Glucose Qualitative, Urine: 1000 mg/dL    pH Urine: 5.0    Blood, Urine: Moderate    Color: Yellow    Urine Appearance: Clear    Bilirubin: Negative    Ketone - Urine: Negative    Specific Gravity: 1.010    Protein, Urine: 15    Urobilinogen: Negative    Nitrite: Negative    Leukocyte Esterase Concentration: Negative      Culture - Urine (collected 26 Aug 2020 06:50)  Source: .Urine Clean Catch (Midstream)  Final Report (27 Aug 2020 01:27):    <10,000 CFU/mL Normal Urogenital Razia        RADIOLOGY & ADDITIONAL TESTS:  < from: CT Head No Cont (08.26.20 @ 00:24) >  Head CT Findings:  The ventricles and sulci are prominent in size, compatible with mild generalized parenchymal volume loss. No acute intracranial hemorrhage is identified.  No extra-axial fluid collection is identified.  No mass effect or midline shift is seen.  There is no evidence of acute territorial infarct.  There are periventricular and subcortical white matter hypodensities, which are nonspecific, but likely reflect mild microvascular ischemic disease.  The visualized paranasal sinuses are clear. The mastoid air cells are well aerated on the left and partially opacified on the right.  No acute osseous abnormality is seen. 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    Cervical Spine CT Findings:  Straightening of usual cervical lordosis is likely related to muscle spasm or positioning. There is mild retrolisthesis at C4-C5. There is mild invagination. There is no acute displaced fracture.  There is no prevertebral soft tissue swelling.  The vertebral body heights are maintained.  There aremultilevel degenerative changes characterized by disc osteophyte complexes and facet and uncinate hypertrophy. This results in mild canal stenosis and multilevel neural foraminal narrowing.    Impression: No CT evidence of acute intracranial abnormality or acute osseous abnormality in the cervical spine.    < end of copied text >

## 2020-08-27 NOTE — CONSULT NOTE ADULT - PROBLEM SELECTOR RECOMMENDATION 4
GOC discussion as above. home hospice referral made per discussion w daughter. [facility] on file for DNR/DNI, no feeding tubes.

## 2020-08-27 NOTE — DIETITIAN INITIAL EVALUATION ADULT. - PERTINENT LABORATORY DATA
08-26 Na135 mmol/L Glu 313 mg/dL<H> K+ 4.0 mmol/L Cr  1.14 mg/dL BUN 22 mg/dL<H> 08-26 Phos 2.7 mg/dL 08-26 Alb 3.2 g/dL<L> 08-26 Chol 123 mg/dL LDL 55 mg/dL HDL 42 mg/dL<L> Trig 130 mg/dL

## 2020-08-27 NOTE — PROGRESS NOTE ADULT - PROBLEM SELECTOR PLAN 1
- Acute on chronic cognitive dysfunction likely 2/2 underlying dementia progression vs severe depression  - Patient had sun downing on ED  - CTH  unremarkable   - CBC unremarkable   - UA, UCx: unremarkable  - CBC wnl, CMP unremarkable, syphillis screen: negative  - Albumin 3.2  - TSH, Folate, B12, ck: WNL  - psych history: patient with h/o severe circadian rhythm disturbance, vascular dementia, previously on antidepressants including, mirtazapine, wellbutrin, buproprion, buspirone, doxepin, sertraline, and trazadone   will continue with home dose melatonin, bupropion , memantine  - SW consulted for placement  - Palliative care recommends home hospice

## 2020-08-27 NOTE — CONSULT NOTE ADULT - PROBLEM SELECTOR RECOMMENDATION 3
2/2 advanced dementia, legally blind. Dependent on all care.  and daughter are caretakers, has HHA 12 h. Fre positioning. For home hospice.

## 2020-08-27 NOTE — DISCHARGE NOTE PROVIDER - NSDCFUADDINST_GEN_ALL_CORE_FT
PLEASE GIVE ZYPREXA EARLY AT 7 PM  TAKE MIRTAZAPINE AT BEDTIME  DO NOT USE ARICEPT OR NAMENDA FOR NOW.  CHECK SUGARS;   INCREASE LANTUS IF SUGARS ARE PERSISTENTLY MORE THAN 200.   PLEASE GIVE SHORT ACTING INSULIN ONLY AFTER MEALS AS ORAL INTAKE CAN VARY AND PATIENT AT TIMES REFUSES TO EAT.

## 2020-08-27 NOTE — DIETITIAN INITIAL EVALUATION ADULT. - PERTINENT MEDS FT
MEDICATIONS  (STANDING):  aspirin  chewable 81 milliGRAM(s) Oral daily  atorvastatin 40 milliGRAM(s) Oral at bedtime  dextrose 5%. 1000 milliLiter(s) (50 mL/Hr) IV Continuous <Continuous>  dextrose 50% Injectable 12.5 Gram(s) IV Push once  dextrose 50% Injectable 25 Gram(s) IV Push once  dextrose 50% Injectable 25 Gram(s) IV Push once  enoxaparin Injectable 40 milliGRAM(s) SubCutaneous daily  insulin glargine Injectable (LANTUS) 15 Unit(s) SubCutaneous every morning  insulin lispro (HumaLOG) corrective regimen sliding scale   SubCutaneous three times a day before meals  insulin lispro (HumaLOG) corrective regimen sliding scale   SubCutaneous at bedtime  lisinopril 5 milliGRAM(s) Oral daily  melatonin 3 milliGRAM(s) Oral at bedtime  mirtazapine 15 milliGRAM(s) Oral daily  sodium chloride 0.9%. 1000 milliLiter(s) (100 mL/Hr) IV Continuous <Continuous>    MEDICATIONS  (PRN):  dextrose 40% Gel 15 Gram(s) Oral once PRN Blood Glucose LESS THAN 70 milliGRAM(s)/deciliter  glucagon  Injectable 1 milliGRAM(s) IntraMuscular once PRN Glucose LESS THAN 70 milligrams/deciliter

## 2020-08-27 NOTE — DIETITIAN INITIAL EVALUATION ADULT. - PROBLEM SELECTOR PLAN 1
Acute on chronic cognitive dysfunction likely 2/2 underlying dementia progression   will evaluate Metabolic (h/o DM ) vs Toxic causes , less likely infectious   Patient had sun downing on ED  CTH  unremarkable   EEG  UA, Utox, CXR, BCx, CBC, CMP, Coag, Mag, phos, syphillis screen  NH3, TSH, Folat, B12, lactate, ck  psych history :patient with h/o severe circadian rhythm disturbance, vascular dementia   will continue with home dose melatonin, bupropion , memantine   SW consulted for placement

## 2020-08-27 NOTE — CONSULT NOTE ADULT - SUBJECTIVE AND OBJECTIVE BOX
HPI:  77 yo F from home lives with  and armindahter ambulates with walker at baseline, HHA 12 hours  PMH dementia, IDDM, HLD, CAD sp 3 stents, legally blind presented with decline in cognitive function X6 weeks and worsening agitation. On my evaluation patient is AAOX 2-3, daughter  Reported patient refused to eat ,drink and having her medications. Patient c/o new onset neck pain 6/10 , aggravates with movements. Patient states she does  not have any appetite and lost weight. Denies fever, chills, palpitation chest wendy , urinary sx, N/V/ D or any other acute complains      Daughter express concerns as she will soon starts to work and will not be able to take care of mother and he would possibly decline further.   GOC : remains full code for now (26 Aug 2020 02:28)    Interval history: no overnight events, patient appears comfortable. Feels hungry. DNR/DNI on file.       PAST MEDICAL & SURGICAL HISTORY:  Blindness  CAD (coronary artery disease)  Dyslipidemia  History of hypertension  Dementia  Depression  Diabetes  History of coronary artery stent placement: x3      SOCIAL HISTORY:  , lives w daughter  Admitted from:  home    Preferred Language: Stateless    Surrogate/HCP/Guardian:    Dora Graf daughter       Phone#: [phone]    FAMILY HISTORY:  No pertinent family history in first degree relatives    Baseline ADLs (prior to admission):  A&Ox1, min ambulatory    Allergies    No Known Allergies    Intolerances      Present Symptoms:      Review of Systems:   Unable to obtain due to poor mentation     MEDICATIONS  (STANDING):  aspirin  chewable 81 milliGRAM(s) Oral daily  atorvastatin 40 milliGRAM(s) Oral at bedtime  dextrose 5%. 1000 milliLiter(s) (50 mL/Hr) IV Continuous <Continuous>  dextrose 50% Injectable 12.5 Gram(s) IV Push once  dextrose 50% Injectable 25 Gram(s) IV Push once  dextrose 50% Injectable 25 Gram(s) IV Push once  enoxaparin Injectable 40 milliGRAM(s) SubCutaneous daily  insulin glargine Injectable (LANTUS) 15 Unit(s) SubCutaneous every morning  insulin lispro (HumaLOG) corrective regimen sliding scale   SubCutaneous three times a day before meals  insulin lispro (HumaLOG) corrective regimen sliding scale   SubCutaneous at bedtime  lisinopril 5 milliGRAM(s) Oral daily  melatonin 3 milliGRAM(s) Oral at bedtime  mirtazapine 15 milliGRAM(s) Oral daily  sodium chloride 0.9%. 1000 milliLiter(s) (100 mL/Hr) IV Continuous <Continuous>    MEDICATIONS  (PRN):  dextrose 40% Gel 15 Gram(s) Oral once PRN Blood Glucose LESS THAN 70 milliGRAM(s)/deciliter  glucagon  Injectable 1 milliGRAM(s) IntraMuscular once PRN Glucose LESS THAN 70 milligrams/deciliter      PHYSICAL EXAM:    Vital Signs Last 24 Hrs  T(C): 36.3 (27 Aug 2020 04:26), Max: 36.6 (26 Aug 2020 19:16)  T(F): 97.4 (27 Aug 2020 04:26), Max: 97.9 (26 Aug 2020 19:16)  HR: 73 (27 Aug 2020 04:) (70 - 78)  BP: 139/50 (27 Aug 2020 04:26) (139/50 - 172/56)  BP(mean): --  RR: 17 (27 Aug 2020 04:) (16 - 18)  SpO2: 100% (27 Aug 2020 04:) (97% - 100%)     Karnofsky Performance Score/Palliative Performance Status Version2:  30   %     GENERAL: alert, cachectic, NAD  HEENT: Atraumatic, oropharynx clear, neck supple  CHEST/LUNG: unlabored  HEART: Regular rate and rhythm    ABDOMEN: Soft, Nontender, Nondistended   MUSCULOSKELETAL:  No  edema   NERVOUS SYSTEM:  Alert & Oriented X1,  follows simple commands, calm  SKIN: No rashes or lesions noted  Oral intake: fair       LABS:                        11.8   6.28  )-----------( 349      ( 26 Aug 2020 06:07 )             36.8         135  |  101  |  22<H>  ----------------------------<  313<H>  4.0   |  31  |  1.14    Ca    8.8      26 Aug 2020 06:07  Phos  2.7       Mg     2.1         TPro  6.9  /  Alb  3.2<L>  /  TBili  0.4  /  DBili  x   /  AST  15  /  ALT  12  /  AlkPhos  107      Urinalysis Basic - ( 26 Aug 2020 01:35 )    Color: Yellow / Appearance: Clear / S.010 / pH: x  Gluc: x / Ketone: Negative  / Bili: Negative / Urobili: Negative   Blood: x / Protein: 15 / Nitrite: Negative   Leuk Esterase: Negative / RBC: 25-50 /HPF / WBC 0-2 /HPF   Sq Epi: x / Non Sq Epi: Few /HPF / Bacteria: Trace /HPF        RADIOLOGY & ADDITIONAL STUDIES:    ADVANCE DIRECTIVES: MOLST for DNR/DNI

## 2020-08-27 NOTE — CONSULT NOTE ADULT - CONVERSATION DETAILS
Spoke with patient's daughter Dora Graf regarding current condition,  overall goals of care. She stated the patient has been declining since 6 wks, not eating well, intermittently agitated. Goal is for the patient to return home, she wants to know how to handle the next time her mother refuses to eat or does not take her meds, feels frustrated. Discussed trajectory of advanced dementia, advised patient is hospice eligible, hospice philosophy discussed, all questions answered. She verbalized understanding and in agreement for referral to home hospice. Discussed risks/benefits of artificial nutrition/PEG vs comfort feeds, she expressed that they do not want feeding tubes. Confirmed DNR/DNI wishes. MOLST on file. Support provided.

## 2020-08-27 NOTE — PROGRESS NOTE ADULT - ATTENDING COMMENTS
Patient seen and examined; Agree with PGY1 A/P above with editing as needed. My independent assessment, findings on exam, diagnosis and plan of care as listed below    Vital Signs Last 24 Hrs  T(C): 36.4 (27 Aug 2020 14:44), Max: 36.6 (26 Aug 2020 19:16)  T(F): 97.6 (27 Aug 2020 14:44), Max: 97.9 (26 Aug 2020 19:16)  HR: 82 (27 Aug 2020 14:44) (73 - 82)  BP: 135/62 (27 Aug 2020 14:44) (135/62 - 172/56)  RR: 18 (27 Aug 2020 14:44) (16 - 18)  SpO2: 97% (27 Aug 2020 14:44) (97% - 100%)    Labs:                        11.8   6.28  )-----------( 349      ( 26 Aug 2020 06:07 )             36.8   08-26    135  |  101  |  22<H>  ----------------------------<  313<H>  4.0   |  31  |  1.14    Ca    8.8      26 Aug 2020 06:07  Phos  2.7     08-26  Mg     2.1     08-26    TPro  6.9  /  Alb  3.2<L>  /  TBili  0.4  /  DBili  x   /  AST  15  /  ALT  12  /  AlkPhos  107  08-26 Patient seen and examined this morning with PGY1 and MS3/4; Agree with PGY1 A/P above with editing as needed. My independent assessment, findings on exam, diagnosis and plan of care as listed below. Discussed with PGY1 Dr. Raymond and PGY3 Dr. Becker.     Patient admitted with refusal to eat at home and FTT with intermittent agitation.     Patient was alert and awake in no distress, able to recall year, President etc. does not want to sit on a chair. Spoke with daughter over the phone this morning. She followed commands.     Vital Signs Last 24 Hrs  T(C): 36.4 (27 Aug 2020 14:44), Max: 36.6 (26 Aug 2020 19:16)  T(F): 97.6 (27 Aug 2020 14:44), Max: 97.9 (26 Aug 2020 19:16)  HR: 82 (27 Aug 2020 14:44) (73 - 82)  BP: 135/62 (27 Aug 2020 14:44) (135/62 - 172/56)  RR: 18 (27 Aug 2020 14:44) (16 - 18)  SpO2: 97% (27 Aug 2020 14:44) (97% - 100%)    Labs:                        11.8   6.28  )-----------( 349      ( 26 Aug 2020 06:07 )             36.8   08-26    135  |  101  |  22<H>  ----------------------------<  313<H>  4.0   |  31  |  1.14    Ca    8.8      26 Aug 2020 06:07  Phos  2.7     08-26  Mg     2.1     08-26    TPro  6.9  /  Alb  3.2<L>  /  TBili  0.4  /  DBili  x   /  AST  15  /  ALT  12  /  AlkPhos  107  08-26    D/D:  Major Depression with refusal to eat likely contributed by Impaired vision   Less likely Dementia  Type 2 DM uncontrolled with Hyperglycemia despite poor oral intake  Hx CAD s/p stent  HTN    Plan:  In my opinion patient does not have advanced dementia rather due to her blindness she feels need to depend on others for ADLs and does not want to be a burden to anyone and hence refuses to eat or participate in ADLs.  I will hold off Aricept and Namenda as these medications can contribute to Psychosis and other side effects  Rather patient would benefit from Remeron which will help with Depression, improve appetite as well as sleep.   Palliative consult appreciated; As per Palliative patient is Hospice eligible.   PGY1 spoke with daughter this afternoon and as per daughter patient not continued on any antidepressant regularly as patient does not have a close follow up as outpatient with any specific physician or [facility] MD  Increase Lantus to 15 units AM (given 10 units in AM plus 5 units stat)   Add Nutritional supplement with Ensure Enlive; Patient agrees to drink and prefer vanilla flavor  Discussed with Dietitian Lindsay  Rest of mgmt as above    Discussed with Patient and Daughter  Discussed with Housestaff and RN and CM/SW

## 2020-08-27 NOTE — DIETITIAN INITIAL EVALUATION ADULT. - ADD RECOMMEND
Add Glucerna Shake 1can bid (440kcal, 20g protein) - vanilla flavor with MVI/minerals daily as medically feasible

## 2020-08-27 NOTE — PROGRESS NOTE ADULT - PROBLEM SELECTOR PLAN 4
- Patient on Insulin at home ( Lantus 15 , lispro 7premeals)   - will start Lantus 15 at bedtime and mild HSS   - Ha1c: 9.2

## 2020-08-27 NOTE — DIETITIAN INITIAL EVALUATION ADULT. - PHYSICAL APPEARANCE
contracted/other (specify)/debilitated/Legally blind/emaciated Nutrition focused physical exam conducted:  Subcutaneous fat loss: [Severe] Orbital fat pads region, [ ]Buccal fat region, [Moderate] Triceps region,  [ ]Ribs region.  Muscle wasting: [Severe] Temples region, [] Clavicle region, [ ]Shoulder region, [ ]Scapula region, [ ]Interosseous region,  [ ]thigh region, [Severe] Calf region

## 2020-08-27 NOTE — DIETITIAN INITIAL EVALUATION ADULT. - FACTORS AFF FOOD INTAKE
pain/change in mental status/difficulty with food procurement/preparation/other (specify)/weakness, dementia, depression, CAD, HTN, diabetes, failure to thrive, h/o stent placement/persistent lack of appetite

## 2020-08-28 LAB
GLUCOSE BLDC GLUCOMTR-MCNC: 168 MG/DL — HIGH (ref 70–99)
GLUCOSE BLDC GLUCOMTR-MCNC: 169 MG/DL — HIGH (ref 70–99)
GLUCOSE BLDC GLUCOMTR-MCNC: 301 MG/DL — HIGH (ref 70–99)
GLUCOSE BLDC GLUCOMTR-MCNC: 339 MG/DL — HIGH (ref 70–99)

## 2020-08-28 PROCEDURE — 99233 SBSQ HOSP IP/OBS HIGH 50: CPT | Mod: GC

## 2020-08-28 RX ORDER — INSULIN GLARGINE 100 [IU]/ML
20 INJECTION, SOLUTION SUBCUTANEOUS EVERY MORNING
Refills: 0 | Status: DISCONTINUED | OUTPATIENT
Start: 2020-08-28 | End: 2020-08-30

## 2020-08-28 RX ADMIN — Medication 4: at 16:30

## 2020-08-28 RX ADMIN — Medication 1: at 11:29

## 2020-08-28 RX ADMIN — Medication 4: at 07:54

## 2020-08-28 RX ADMIN — LISINOPRIL 5 MILLIGRAM(S): 2.5 TABLET ORAL at 05:41

## 2020-08-28 RX ADMIN — Medication 81 MILLIGRAM(S): at 11:30

## 2020-08-28 RX ADMIN — INSULIN GLARGINE 15 UNIT(S): 100 INJECTION, SOLUTION SUBCUTANEOUS at 08:08

## 2020-08-28 RX ADMIN — ATORVASTATIN CALCIUM 40 MILLIGRAM(S): 80 TABLET, FILM COATED ORAL at 21:33

## 2020-08-28 RX ADMIN — ENOXAPARIN SODIUM 40 MILLIGRAM(S): 100 INJECTION SUBCUTANEOUS at 11:29

## 2020-08-28 RX ADMIN — MIRTAZAPINE 15 MILLIGRAM(S): 45 TABLET, ORALLY DISINTEGRATING ORAL at 21:32

## 2020-08-28 RX ADMIN — Medication 3 MILLIGRAM(S): at 21:33

## 2020-08-28 NOTE — PROGRESS NOTE ADULT - ATTENDING COMMENTS
Patient seen and examined this morning with PGY1 and MS3/4; Agree with PGY1 A/P above with editing as needed. My independent assessment, findings on exam, diagnosis and plan of care as listed below. Discussed with PGY1 Dr. Raymond and PGY3 Dr. Becker.     Patient admitted with refusal to eat at home and FTT with intermittent agitation.     Patient is doing okay, lying in bed; not co-operative with questioning but answers intermittently;     Vital Signs Last 24 Hrs: Stable as above    Labs: No new as above    D/D:  Major Depression with refusal to eat likely contributed by Impaired vision   Less likely Dementia  Type 2 DM uncontrolled with Hyperglycemia despite poor oral intake  Hx CAD s/p stent  HTN    Plan:  Patient refuses to eat/ co-operate   I will hold off Aricept and Namenda as these medications can contribute to Psychosis and other side effects  Continue Remeron which will help with Depression, improve appetite as well as sleep.   Palliative consult appreciated; As per Palliative patient is Hospice eligible.   Daughter accepted Hospice; consent to be signed  Spoke with Daughter Dora at bedside  Increase Lantus to 20 units AM  Nutritional supplement with Glucerna if patient will take ; Patient agrees to drink and prefer vanilla flavor  Rest of mgmt as above    Discussed with Patient and Daughter  Discussed with Housestaff and RN and CM/SW

## 2020-08-28 NOTE — PROGRESS NOTE ADULT - SUBJECTIVE AND OBJECTIVE BOX
PGY-1 Progress Note discussed with attending    PAGER #: [[phone]] TILL 5:00 PM  PLEASE CONTACT ON CALL TEAM:  - On Call Team (Please refer to Geeta) FROM 5:00 PM - 8:30PM  - Nightfloat Team FROM 8:30 -7:30 AM    CHIEF COMPLAINT & BRIEF HOSPITAL COURSE:  77 yo F from home lives with  and doughter ambulates with walker at baseline, HHA 12 hours. PMH dementia, IDDM, HLD, CAD sp 3 stents, depression, legally blind presented with decline in cognitive function X6 weeks, worsening agitation, refusal to eat and failure to thrive. Reported patient refused to eat ,drink and having her medications. CT head and spine are negative for any acute pathology, they show chronic microvascular changes, fibrous dysplasia, and mild canal stenosis with multilevel foramen narrowing. Dietician evaluated the patient and recommend patient be in soft diet, with glucerna supplementation. Palliative care evaluated the patient, patient is now DNR/DNI, they family does not want feeding tubes, palliative care recommends home hospice.  was consulted.   Referral for home hospice was sent, patient awaiting approval.     INTERVAL HPI/OVERNIGHT EVENTS:   Patient seen at bedside this morning, she was sleeping and refusing to answer questions or participate in physical exam. She kept asking us to be quiet and to leave her alone. Patient's breakfast was seen on the table, untouched.       REVIEW OF SYSTEMS:  unable to asses based on patient's refusal to answer question    MEDICATIONS  (STANDING):  aspirin  chewable 81 milliGRAM(s) Oral daily  atorvastatin 40 milliGRAM(s) Oral at bedtime  dextrose 5%. 1000 milliLiter(s) (50 mL/Hr) IV Continuous <Continuous>  dextrose 50% Injectable 12.5 Gram(s) IV Push once  dextrose 50% Injectable 25 Gram(s) IV Push once  dextrose 50% Injectable 25 Gram(s) IV Push once  enoxaparin Injectable 40 milliGRAM(s) SubCutaneous daily  insulin glargine Injectable (LANTUS) 15 Unit(s) SubCutaneous every morning  insulin lispro (HumaLOG) corrective regimen sliding scale   SubCutaneous three times a day before meals  insulin lispro (HumaLOG) corrective regimen sliding scale   SubCutaneous at bedtime  lisinopril 5 milliGRAM(s) Oral daily  melatonin 3 milliGRAM(s) Oral at bedtime  mirtazapine 15 milliGRAM(s) Oral at bedtime  sodium chloride 0.9%. 1000 milliLiter(s) (100 mL/Hr) IV Continuous <Continuous>    MEDICATIONS  (PRN):  dextrose 40% Gel 15 Gram(s) Oral once PRN Blood Glucose LESS THAN 70 milliGRAM(s)/deciliter  glucagon  Injectable 1 milliGRAM(s) IntraMuscular once PRN Glucose LESS THAN 70 milligrams/deciliter      Vital Signs Last 24 Hrs  T(C): 36.4 (28 Aug 2020 05:12), Max: 36.9 (27 Aug 2020 20:43)  T(F): 97.5 (28 Aug 2020 05:12), Max: 98.4 (27 Aug 2020 20:43)  HR: 68 (28 Aug 2020 05:12) (68 - 82)  BP: 174/67 (28 Aug 2020 05:12) (115/44 - 174/67)  BP(mean): --  RR: 18 (28 Aug 2020 05:12) (17 - 18)  SpO2: 98% (28 Aug 2020 05:12) (97% - 98%)    PHYSICAL EXAMINATION: limited due to patients refusal to participate.   GENERAL: NAD, thin.   HEAD:  Atraumatic, Normocephalic  EYES:  conjunctiva and sclera clear  NECK: unable to examine  CHEST/LUNG: unable to examine  HEART: unable to examine  ABDOMEN:   NERVOUS SYSTEM:  Alert & Oriented, refusing to answer questions  EXTREMITIES: No clubbing, cyanosis, or edema  SKIN: warm dry    I&O's Summary      Culture - Urine (collected 26 Aug 2020 06:50)  Source: .Urine Clean Catch (Midstream)  Final Report (27 Aug 2020 01:27):    <10,000 CFU/mL Normal Urogenital Razia        RADIOLOGY & ADDITIONAL TESTS:

## 2020-08-28 NOTE — PROGRESS NOTE ADULT - PROBLEM SELECTOR PLAN 1
- Acute on chronic cognitive dysfunction likely 2/2 underlying dementia progression vs severe depression  - Patient had sun downing on ED  - CTH  unremarkable   - CBC unremarkable   - UA, UCx: unremarkable  - CBC wnl, CMP unremarkable, syphillis screen: negative  - Albumin 3.2  - TSH, Folate, B12, ck: WNL  - psych history: patient with h/o severe circadian rhythm disturbance, vascular dementia, previously on antidepressants including, mirtazapine, wellbutrin, buproprion, buspirone, doxepin, sertraline, and trazadone   will continue with home dose melatonin, bupropion , memantine  - SW consulted   - Palliative care recommends home hospice

## 2020-08-28 NOTE — PROGRESS NOTE ADULT - PROBLEM SELECTOR PLAN 4
- Patient on Insulin at home ( Lantus 15 , lispro 7premeals)   - Patient still having blood sugars of 200s-300s on Lantus 15 at bedtime and mild HSS   - will increase lantus to 20  - Ha1c: 9.2

## 2020-08-28 NOTE — GOALS OF CARE CONVERSATION - ADVANCED CARE PLANNING - CONVERSATION DETAILS
Friday 8/28: [facility]: Patient was approved for hospice services and daughter Dora has [facility] consents and wants to take them home and read them before discussing them with this RN. DME including bed and 02 will be ordered upon receipt of consents. REUBEN Head is aware. Samir Patterson RN.

## 2020-08-29 LAB
GLUCOSE BLDC GLUCOMTR-MCNC: 113 MG/DL — HIGH (ref 70–99)
GLUCOSE BLDC GLUCOMTR-MCNC: 153 MG/DL — HIGH (ref 70–99)
GLUCOSE BLDC GLUCOMTR-MCNC: 281 MG/DL — HIGH (ref 70–99)
GLUCOSE BLDC GLUCOMTR-MCNC: 311 MG/DL — HIGH (ref 70–99)

## 2020-08-29 PROCEDURE — 99232 SBSQ HOSP IP/OBS MODERATE 35: CPT

## 2020-08-29 RX ADMIN — ENOXAPARIN SODIUM 40 MILLIGRAM(S): 100 INJECTION SUBCUTANEOUS at 11:38

## 2020-08-29 RX ADMIN — Medication 3 MILLIGRAM(S): at 21:36

## 2020-08-29 RX ADMIN — Medication 3: at 16:50

## 2020-08-29 RX ADMIN — LISINOPRIL 5 MILLIGRAM(S): 2.5 TABLET ORAL at 05:18

## 2020-08-29 RX ADMIN — Medication 4: at 21:35

## 2020-08-29 RX ADMIN — MIRTAZAPINE 15 MILLIGRAM(S): 45 TABLET, ORALLY DISINTEGRATING ORAL at 21:36

## 2020-08-29 RX ADMIN — ATORVASTATIN CALCIUM 40 MILLIGRAM(S): 80 TABLET, FILM COATED ORAL at 21:36

## 2020-08-29 RX ADMIN — INSULIN GLARGINE 20 UNIT(S): 100 INJECTION, SOLUTION SUBCUTANEOUS at 11:37

## 2020-08-29 RX ADMIN — Medication 1: at 11:38

## 2020-08-29 RX ADMIN — Medication 81 MILLIGRAM(S): at 11:38

## 2020-08-29 NOTE — CHART NOTE - NSCHARTNOTEFT_GEN_A_CORE
Patient seen and examined this morning with PGY1 and MS3/4; Agree with PGY1 A/P above with editing as needed. My independent assessment, findings on exam, diagnosis and plan of care as listed below. Discussed with PGY1 Dr. Raymond and PGY3 Dr. Becker.     Patient admitted with refusal to eat at home and FTT with intermittent agitation.     Patient is doing okay, lying in bed; not co-operative with questioning but answers intermittently;     Vital Signs Last 24 Hrs  T(C): 37.3 (29 Aug 2020 14:26), Max: 37.3 (29 Aug 2020 14:26)  T(F): 99.1 (29 Aug 2020 14:26), Max: 99.1 (29 Aug 2020 14:26)  HR: 82 (29 Aug 2020 14:26) (71 - 88)  BP: 145/73 (29 Aug 2020 14:26) (145/66 - 154/76)  RR: 17 (29 Aug 2020 14:26) (17 - 18)  SpO2: 96% (29 Aug 2020 14:26) (96% - 99%)    Labs: No new     D/D:  Major Depression with refusal to eat likely contributed by Impaired vision   Less likely Dementia  Type 2 DM uncontrolled with Hyperglycemia despite poor oral intake  Hx CAD s/p stent  HTN    Plan:  Patient refuses to eat/ co-operate   I will continue to hold off Aricept and Namenda as these medications can contribute to Psychosis and other side effects  Continue Remeron which will help with Depression, improve appetite as well as sleep.   Palliative consult appreciated; As per Palliative patient is Hospice eligible.   Daughter accepted Hospice; consent to be signed  Increase Lantus to 20 units AM  Nutritional supplement with Glucerna if patient will take ;  Rest of mgmt as above    Discussed with Patient   Discussed with Housestaff and RN and CM/SW . Patient seen and examined this afternoon.     Patient admitted with refusal to eat at home and FTT with intermittent agitation.     Patient is doing better today, much more alert and awake, following commands. Had a long conversation with her about her refusal to eat. As per patient she does not want to live like this especially given her vision.     Vital Signs Last 24 Hrs  T(C): 37.3 (29 Aug 2020 14:26), Max: 37.3 (29 Aug 2020 14:26)  T(F): 99.1 (29 Aug 2020 14:26), Max: 99.1 (29 Aug 2020 14:26)  HR: 82 (29 Aug 2020 14:26) (71 - 88)  BP: 145/73 (29 Aug 2020 14:26) (145/66 - 154/76)  RR: 17 (29 Aug 2020 14:26) (17 - 18)  SpO2: 96% (29 Aug 2020 14:26) (96% - 99%)    Labs: No new     D/D:  Major Depression with refusal to eat likely contributed by Impaired vision   Less likely Dementia  Type 2 DM uncontrolled with Hyperglycemia despite poor oral intake  Hx CAD s/p stent  HTN    Plan:  Patient refuses to eat/ co-operate   I will continue to hold off Aricept and Namenda as these medications can contribute to Psychosis and other side effects  Continue Remeron which will help with Depression, improve appetite as well as sleep.   Palliative consult appreciated; As per Palliative patient is Hospice eligible.   Daughter accepted Hospice; consent to be signed  Increase Lantus to 20 units AM  Nutritional supplement with Glucerna if patient will take ;  Rest of mgmt as above    Discussed with Patient   Discussed with Housestaff and RN and CM/SW . Patient seen and examined this afternoon at 3.30 PM    Patient admitted with refusal to eat at home and FTT with intermittent agitation.     Patient is doing better today, much more alert and awake, following commands. Had a long conversation with her about her refusal to eat. As per patient she does not want to live like this especially given her vision.   I was able to give her a full can of Glucerna which she took after initial hesitation. She was also asking for Bread    patient otherwise offers no complaints; denies fever, chills, SOB, palpitations, chest pain, nausea, vomiting, diarrhea, constipation,       Vital Signs Last 24 Hrs  T(C): 37.3 (29 Aug 2020 14:26), Max: 37.3 (29 Aug 2020 14:26)  T(F): 99.1 (29 Aug 2020 14:26), Max: 99.1 (29 Aug 2020 14:26)  HR: 82 (29 Aug 2020 14:26) (71 - 88)  BP: 145/73 (29 Aug 2020 14:26) (145/66 - 154/76)  RR: 17 (29 Aug 2020 14:26) (17 - 18)  SpO2: 96% (29 Aug 2020 14:26) (96% - 99%)    MEDICATIONS  (STANDING):  aspirin  chewable 81 milliGRAM(s) Oral daily  atorvastatin 40 milliGRAM(s) Oral at bedtime  dextrose 5%. 1000 milliLiter(s) (50 mL/Hr) IV Continuous <Continuous>  dextrose 50% Injectable 12.5 Gram(s) IV Push once  dextrose 50% Injectable 25 Gram(s) IV Push once  dextrose 50% Injectable 25 Gram(s) IV Push once  enoxaparin Injectable 40 milliGRAM(s) SubCutaneous daily  insulin glargine Injectable (LANTUS) 20 Unit(s) SubCutaneous every morning  insulin lispro (HumaLOG) corrective regimen sliding scale   SubCutaneous three times a day before meals  insulin lispro (HumaLOG) corrective regimen sliding scale   SubCutaneous at bedtime  lisinopril 5 milliGRAM(s) Oral daily  melatonin 3 milliGRAM(s) Oral at bedtime  mirtazapine 15 milliGRAM(s) Oral at bedtime  sodium chloride 0.9%. 1000 milliLiter(s) (100 mL/Hr) IV Continuous <Continuous>    MEDICATIONS  (PRN):  dextrose 40% Gel 15 Gram(s) Oral once PRN Blood Glucose LESS THAN 70 milliGRAM(s)/deciliter  glucagon  Injectable 1 milliGRAM(s) IntraMuscular once PRN Glucose LESS THAN 70 milligrams/deciliter      Labs: No new     D/D:  Major Depression with refusal to eat likely contributed by Impaired vision   Less likely Dementia  Type 2 DM uncontrolled with Hyperglycemia despite poor oral intake  Hx CAD s/p stent  HTN    Plan:  Patient refuses to eat/ co-operate intermittently  I will continue to hold off Aricept and Namenda as these medications can contribute to Psychosis and other side effects  Continue Remeron which will help with Depression, improve appetite as well as sleep.   As patient is more co-operative and participating in discussion, will request Psych Consult; Spoke with tele psych hub at [phone]  Contact info left for call back tomorrow AM  Patient for Discharge with home Hospice on Monday once consent in place  Increase Lantus to 20 units AM  Nutritional supplement with Glucerna  Try to get labs tomorrow if patient lets; will order tomorrow morning so that patient is not woken up early  Spoke with Daughter Dora and updated today's clinical status and improved mood; Patient also requested to talk to daughter which was arranged over the phone      Discussed with Patient   Discussed with EMMANUEL Randall and PCA Patient seen and examined this afternoon at 3.30 PM    Patient admitted with refusal to eat at home and FTT with intermittent agitation.     Patient is doing better today, much more alert and awake, following commands. Had a long conversation with her about her refusal to eat. As per patient she does not want to live like this especially given her vision.   I was able to give her a full can of Glucerna which she took after initial hesitation. She was also asking for Bread    patient otherwise offers no complaints; denies fever, chills, SOB, palpitations, chest pain, nausea, vomiting, diarrhea, constipation,       Vital Signs Last 24 Hrs  T(C): 37.3 (29 Aug 2020 14:26), Max: 37.3 (29 Aug 2020 14:26)  T(F): 99.1 (29 Aug 2020 14:26), Max: 99.1 (29 Aug 2020 14:26)  HR: 82 (29 Aug 2020 14:26) (71 - 88)  BP: 145/73 (29 Aug 2020 14:26) (145/66 - 154/76)  RR: 17 (29 Aug 2020 14:26) (17 - 18)  SpO2: 96% (29 Aug 2020 14:26) (96% - 99%)    MEDICATIONS  (STANDING):  aspirin  chewable 81 milliGRAM(s) Oral daily  atorvastatin 40 milliGRAM(s) Oral at bedtime  dextrose 5%. 1000 milliLiter(s) (50 mL/Hr) IV Continuous <Continuous>  dextrose 50% Injectable 12.5 Gram(s) IV Push once  dextrose 50% Injectable 25 Gram(s) IV Push once  dextrose 50% Injectable 25 Gram(s) IV Push once  enoxaparin Injectable 40 milliGRAM(s) SubCutaneous daily  insulin glargine Injectable (LANTUS) 20 Unit(s) SubCutaneous every morning  insulin lispro (HumaLOG) corrective regimen sliding scale   SubCutaneous three times a day before meals  insulin lispro (HumaLOG) corrective regimen sliding scale   SubCutaneous at bedtime  lisinopril 5 milliGRAM(s) Oral daily  melatonin 3 milliGRAM(s) Oral at bedtime  mirtazapine 15 milliGRAM(s) Oral at bedtime  sodium chloride 0.9%. 1000 milliLiter(s) (100 mL/Hr) IV Continuous <Continuous>    MEDICATIONS  (PRN):  dextrose 40% Gel 15 Gram(s) Oral once PRN Blood Glucose LESS THAN 70 milliGRAM(s)/deciliter  glucagon  Injectable 1 milliGRAM(s) IntraMuscular once PRN Glucose LESS THAN 70 milligrams/deciliter      Labs: No new     D/D:  Major Depression with refusal to eat likely contributed by Impaired vision   Less likely Dementia  Type 2 DM uncontrolled with Hyperglycemia despite poor oral intake  Hx CAD s/p stent  HTN    Plan:  Patient refuses to eat/ co-operate intermittently but she is clinicaly much improved today  I will continue to hold off Aricept and Namenda as these medications can contribute to Psychosis and other side effects  Continue Remeron which will help with Depression, improve appetite as well as sleep.   As patient is more co-operative and participating in discussion, will request Psych Consult; Spoke with tele psych hub at [phone]  Contact info left for call back tomorrow AM  Patient for Discharge with home Hospice on Monday once consent in place  Increase Lantus to 20 units AM  Nutritional supplement with Glucerna  Try to get labs tomorrow if patient lets; will order tomorrow morning so that patient is not woken up early  Spoke with Daughter Dora and updated today's clinical status and improved mood; Patient also requested to talk to daughter which was arranged over the phone      Discussed with Patient   Discussed with EMMANUEL Hennessy and PCA

## 2020-08-30 LAB
GLUCOSE BLDC GLUCOMTR-MCNC: 255 MG/DL — HIGH (ref 70–99)
GLUCOSE BLDC GLUCOMTR-MCNC: 340 MG/DL — HIGH (ref 70–99)
GLUCOSE BLDC GLUCOMTR-MCNC: 91 MG/DL — SIGNIFICANT CHANGE UP (ref 70–99)

## 2020-08-30 PROCEDURE — 99232 SBSQ HOSP IP/OBS MODERATE 35: CPT | Mod: GC

## 2020-08-30 RX ORDER — INSULIN LISPRO 100/ML
3 VIAL (ML) SUBCUTANEOUS
Refills: 0 | Status: DISCONTINUED | OUTPATIENT
Start: 2020-08-30 | End: 2020-08-31

## 2020-08-30 RX ORDER — INSULIN LISPRO 100/ML
VIAL (ML) SUBCUTANEOUS AT BEDTIME
Refills: 0 | Status: DISCONTINUED | OUTPATIENT
Start: 2020-08-30 | End: 2020-08-31

## 2020-08-30 RX ORDER — INSULIN GLARGINE 100 [IU]/ML
16 INJECTION, SOLUTION SUBCUTANEOUS EVERY MORNING
Refills: 0 | Status: DISCONTINUED | OUTPATIENT
Start: 2020-08-30 | End: 2020-08-31

## 2020-08-30 RX ADMIN — INSULIN GLARGINE 16 UNIT(S): 100 INJECTION, SOLUTION SUBCUTANEOUS at 10:00

## 2020-08-30 RX ADMIN — Medication 4: at 16:56

## 2020-08-30 RX ADMIN — Medication 1: at 22:05

## 2020-08-30 NOTE — PROGRESS NOTE ADULT - PROBLEM SELECTOR PLAN 5
- c/w lisinopril home dose   - monitor BP - c/w lisinopril home dose   - monitor BP  - refusing PO BP meds, will order IV if BP increased and patient continues to refuse

## 2020-08-30 NOTE — PROGRESS NOTE ADULT - SUBJECTIVE AND OBJECTIVE BOX
PGY-1 Progress Note discussed with attending    PAGER #: [[phone]] TILL 5:00 PM  PLEASE CONTACT ON CALL TEAM:   - On Call Team (Please refer to Geeta) FROM 5:00 PM - 8:30PM  - Nightfloat Team FROM 8:30 -7:30 AM    CHIEF COMPLAINT & BRIEF HOSPITAL COURSE:  79 yo F from home lives with  and doughter ambulates with walker at baseline, HHA 12 hours. PMH dementia, IDDM, HLD, CAD sp 3 stents, depression, legally blind presented with decline in cognitive function X6 weeks, worsening agitation, refusal to eat and failure to thrive. Reported patient refused to eat ,drink and having her medications. CT head and spine are negative for any acute pathology, they show chronic microvascular changes, fibrous dysplasia, and mild canal stenosis with multilevel foramen narrowing. Dietician evaluated the patient and recommend patient be in soft diet, with glucerna supplementation. Palliative care evaluated the patient, patient is now DNR/DNI, they family does not want feeding tubes, palliative care recommends home hospice.  was consulted.   Referral for home hospice was sent, patient awaiting approval.     INTERVAL HPI/OVERNIGHT EVENTS:   ***    REVIEW OF SYSTEMS:  CONSTITUTIONAL: No fever, weight loss, or fatigue  RESPIRATORY: No cough, wheezing, chills or hemoptysis; No shortness of breath  CARDIOVASCULAR: No chest pain, palpitations, dizziness, or leg swelling  GASTROINTESTINAL: No abdominal pain. No nausea, vomiting, or hematemesis; No diarrhea or constipation. No melena or hematochezia.  GENITOURINARY: No dysuria or hematuria, urinary frequency  NEUROLOGICAL: No headaches, memory loss, loss of strength, numbness, or tremors  SKIN: No itching, burning, rashes, or lesions     MEDICATIONS  (STANDING):  aspirin  chewable 81 milliGRAM(s) Oral daily  atorvastatin 40 milliGRAM(s) Oral at bedtime  dextrose 5%. 1000 milliLiter(s) (50 mL/Hr) IV Continuous <Continuous>  dextrose 50% Injectable 12.5 Gram(s) IV Push once  dextrose 50% Injectable 25 Gram(s) IV Push once  dextrose 50% Injectable 25 Gram(s) IV Push once  enoxaparin Injectable 40 milliGRAM(s) SubCutaneous daily  insulin glargine Injectable (LANTUS) 20 Unit(s) SubCutaneous every morning  insulin lispro (HumaLOG) corrective regimen sliding scale   SubCutaneous three times a day before meals  insulin lispro (HumaLOG) corrective regimen sliding scale   SubCutaneous at bedtime  lisinopril 5 milliGRAM(s) Oral daily  melatonin 3 milliGRAM(s) Oral at bedtime  mirtazapine 15 milliGRAM(s) Oral at bedtime  sodium chloride 0.9%. 1000 milliLiter(s) (100 mL/Hr) IV Continuous <Continuous>    MEDICATIONS  (PRN):  dextrose 40% Gel 15 Gram(s) Oral once PRN Blood Glucose LESS THAN 70 milliGRAM(s)/deciliter  glucagon  Injectable 1 milliGRAM(s) IntraMuscular once PRN Glucose LESS THAN 70 milligrams/deciliter      Vital Signs Last 24 Hrs  T(C): 37.1 (29 Aug 2020 20:50), Max: 37.3 (29 Aug 2020 14:26)  T(F): 98.7 (29 Aug 2020 20:50), Max: 99.1 (29 Aug 2020 14:26)  HR: 100 (29 Aug 2020 20:50) (71 - 100)  BP: 170/69 (29 Aug 2020 20:50) (145/73 - 170/69)  BP(mean): --  RR: 17 (29 Aug 2020 20:50) (17 - 18)  SpO2: 96% (29 Aug 2020 20:50) (96% - 98%)    PHYSICAL EXAMINATION:  GENERAL: NAD, well built  HEAD:  Atraumatic, Normocephalic  EYES:  conjunctiva and sclera clear  NECK: Supple, No JVD, Normal thyroid  CHEST/LUNG: Clear to auscultation. Clear to percussion bilaterally; No rales, rhonchi, wheezing, or rubs  HEART: Regular rate and rhythm; No murmurs, rubs, or gallops  ABDOMEN: Soft, Nontender, Nondistended; Bowel sounds present  NERVOUS SYSTEM:  Alert & Oriented X3,    EXTREMITIES:  2+ Peripheral Pulses, No clubbing, cyanosis, or edema  SKIN: warm dry                          I&O's Summary        CAPILLARY BLOOD GLUCOSE  CAPILLARY BLOOD GLUCOSE      POCT Blood Glucose.: 311 mg/dL (29 Aug 2020 21:07)    CAPILLARY BLOOD GLUCOSE      POCT Blood Glucose.: 311 mg/dL (29 Aug 2020 21:07)  POCT Blood Glucose.: 281 mg/dL (29 Aug 2020 16:38)  POCT Blood Glucose.: 153 mg/dL (29 Aug 2020 11:28)  POCT Blood Glucose.: 113 mg/dL (29 Aug 2020 07:52)      RADIOLOGY & ADDITIONAL TESTS: PGY-1 Progress Note discussed with attending    PAGER #: [[phone]] TILL 5:00 PM  PLEASE CONTACT ON CALL TEAM:   - On Call Team (Please refer to Geeta) FROM 5:00 PM - 8:30PM  - Nightfloat Team FROM 8:30 -7:30 AM    CHIEF COMPLAINT & BRIEF HOSPITAL COURSE:  79 yo F from home lives with  and daughter ambulates with walker at baseline, HHA 12 hours. PMH dementia, IDDM, HLD, CAD sp 3 stents, depression, legally blind presented with decline in cognitive function X6 weeks, worsening agitation, refusal to eat and failure to thrive. Reported patient refused to eat ,drink and having her medications. CT head and spine are negative for any acute pathology, they show chronic microvascular changes, fibrous dysplasia, and mild canal stenosis with multilevel foramen narrowing. Dietician evaluated the patient and recommend patient be in soft diet, with glucerna supplementation. Palliative care evaluated the patient, patient is now DNR/DNI, they family does not want feeding tubes, palliative care recommends home hospice.  was consulted.   Referral for home hospice was sent, patient awaiting approval.     INTERVAL HPI/OVERNIGHT EVENTS:   Patient had elevated BP in am and refused PO medications. Stated that her blood pressure is fine. After re-check improved BP. Stable and asymptomatic. ***    REVIEW OF SYSTEMS:  CONSTITUTIONAL: No fever, weight loss, or fatigue  RESPIRATORY: No cough, wheezing, chills or hemoptysis; No shortness of breath  CARDIOVASCULAR: No chest pain, palpitations, dizziness, or leg swelling  GASTROINTESTINAL: No abdominal pain. No nausea, vomiting, or hematemesis; No diarrhea or constipation. No melena or hematochezia.  GENITOURINARY: No dysuria or hematuria, urinary frequency  NEUROLOGICAL: No headaches, memory loss, loss of strength, numbness, or tremors  SKIN: No itching, burning, rashes, or lesions     MEDICATIONS  (STANDING):  aspirin  chewable 81 milliGRAM(s) Oral daily  atorvastatin 40 milliGRAM(s) Oral at bedtime  dextrose 5%. 1000 milliLiter(s) (50 mL/Hr) IV Continuous <Continuous>  dextrose 50% Injectable 12.5 Gram(s) IV Push once  dextrose 50% Injectable 25 Gram(s) IV Push once  dextrose 50% Injectable 25 Gram(s) IV Push once  enoxaparin Injectable 40 milliGRAM(s) SubCutaneous daily  insulin glargine Injectable (LANTUS) 20 Unit(s) SubCutaneous every morning  insulin lispro (HumaLOG) corrective regimen sliding scale   SubCutaneous three times a day before meals  insulin lispro (HumaLOG) corrective regimen sliding scale   SubCutaneous at bedtime  lisinopril 5 milliGRAM(s) Oral daily  melatonin 3 milliGRAM(s) Oral at bedtime  mirtazapine 15 milliGRAM(s) Oral at bedtime  sodium chloride 0.9%. 1000 milliLiter(s) (100 mL/Hr) IV Continuous <Continuous>    MEDICATIONS  (PRN):  dextrose 40% Gel 15 Gram(s) Oral once PRN Blood Glucose LESS THAN 70 milliGRAM(s)/deciliter  glucagon  Injectable 1 milliGRAM(s) IntraMuscular once PRN Glucose LESS THAN 70 milligrams/deciliter      Vital Signs Last 24 Hrs  T(C): 37.1 (29 Aug 2020 20:50), Max: 37.3 (29 Aug 2020 14:26)  T(F): 98.7 (29 Aug 2020 20:50), Max: 99.1 (29 Aug 2020 14:26)  HR: 100 (29 Aug 2020 20:50) (71 - 100)  BP: 170/69 (29 Aug 2020 20:50) (145/73 - 170/69)  BP(mean): --  RR: 17 (29 Aug 2020 20:50) (17 - 18)  SpO2: 96% (29 Aug 2020 20:50) (96% - 98%)    PHYSICAL EXAMINATION:  GENERAL: NAD, well built  HEAD:  Atraumatic, Normocephalic  EYES:  conjunctiva and sclera clear  NECK: Supple, No JVD, Normal thyroid  CHEST/LUNG: Clear to auscultation. Clear to percussion bilaterally; No rales, rhonchi, wheezing, or rubs  HEART: Regular rate and rhythm; No murmurs, rubs, or gallops  ABDOMEN: Soft, Nontender, Nondistended; Bowel sounds present  NERVOUS SYSTEM:  Alert & Oriented X3,    EXTREMITIES:  2+ Peripheral Pulses, No clubbing, cyanosis, or edema  SKIN: warm dry                          I&O's Summary        CAPILLARY BLOOD GLUCOSE  CAPILLARY BLOOD GLUCOSE      POCT Blood Glucose.: 311 mg/dL (29 Aug 2020 21:07)    CAPILLARY BLOOD GLUCOSE      POCT Blood Glucose.: 311 mg/dL (29 Aug 2020 21:07)  POCT Blood Glucose.: 281 mg/dL (29 Aug 2020 16:38)  POCT Blood Glucose.: 153 mg/dL (29 Aug 2020 11:28)  POCT Blood Glucose.: 113 mg/dL (29 Aug 2020 07:52)      RADIOLOGY & ADDITIONAL TESTS: PGY-1 Progress Note discussed with attending    PAGER #: [[phone]] TILL 5:00 PM  PLEASE CONTACT ON CALL TEAM:   - On Call Team (Please refer to Geeta) FROM 5:00 PM - 8:30PM  - Nightfloat Team FROM 8:30 -7:30 AM    CHIEF COMPLAINT & BRIEF HOSPITAL COURSE:  77 yo F from home lives with  and daughter ambulates with walker at baseline, HHA 12 hours. PMH dementia, IDDM, HLD, CAD sp 3 stents, depression, legally blind presented with decline in cognitive function X6 weeks, worsening agitation, refusal to eat and failure to thrive. Reported patient refused to eat ,drink and having her medications. CT head and spine are negative for any acute pathology, they show chronic microvascular changes, fibrous dysplasia, and mild canal stenosis with multilevel foramen narrowing. Dietician evaluated the patient and recommend patient be in soft diet, with glucerna supplementation. Palliative care evaluated the patient, patient is now DNR/DNI, they family does not want feeding tubes, palliative care recommends home hospice.  was consulted.   Referral for home hospice was sent, patient awaiting approval.     INTERVAL HPI/OVERNIGHT EVENTS:   Patient had elevated BP in am and refused PO medications. Stated that her blood pressure is fine. After re-check improved BP. Stable and asymptomatic. She continues to refuse any BP meds. No complaints this AM.    REVIEW OF SYSTEMS:  CONSTITUTIONAL: No fever, weight loss, or fatigue  RESPIRATORY: No cough, wheezing, chills or hemoptysis; No shortness of breath  CARDIOVASCULAR: No chest pain, palpitations, dizziness, or leg swelling  GASTROINTESTINAL: No abdominal pain. No nausea, vomiting, or hematemesis; No diarrhea or constipation. No melena or hematochezia.  GENITOURINARY: No dysuria or hematuria, urinary frequency  NEUROLOGICAL: No headaches, memory loss, loss of strength, numbness, or tremors  SKIN: No itching, burning, rashes, or lesions     MEDICATIONS  (STANDING):  aspirin  chewable 81 milliGRAM(s) Oral daily  atorvastatin 40 milliGRAM(s) Oral at bedtime  dextrose 5%. 1000 milliLiter(s) (50 mL/Hr) IV Continuous <Continuous>  dextrose 50% Injectable 12.5 Gram(s) IV Push once  dextrose 50% Injectable 25 Gram(s) IV Push once  dextrose 50% Injectable 25 Gram(s) IV Push once  enoxaparin Injectable 40 milliGRAM(s) SubCutaneous daily  insulin glargine Injectable (LANTUS) 20 Unit(s) SubCutaneous every morning  insulin lispro (HumaLOG) corrective regimen sliding scale   SubCutaneous three times a day before meals  insulin lispro (HumaLOG) corrective regimen sliding scale   SubCutaneous at bedtime  lisinopril 5 milliGRAM(s) Oral daily  melatonin 3 milliGRAM(s) Oral at bedtime  mirtazapine 15 milliGRAM(s) Oral at bedtime  sodium chloride 0.9%. 1000 milliLiter(s) (100 mL/Hr) IV Continuous <Continuous>    MEDICATIONS  (PRN):  dextrose 40% Gel 15 Gram(s) Oral once PRN Blood Glucose LESS THAN 70 milliGRAM(s)/deciliter  glucagon  Injectable 1 milliGRAM(s) IntraMuscular once PRN Glucose LESS THAN 70 milligrams/deciliter      Vital Signs Last 24 Hrs  T(C): 37.1 (29 Aug 2020 20:50), Max: 37.3 (29 Aug 2020 14:26)  T(F): 98.7 (29 Aug 2020 20:50), Max: 99.1 (29 Aug 2020 14:26)  HR: 100 (29 Aug 2020 20:50) (71 - 100)  BP: 170/69 (29 Aug 2020 20:50) (145/73 - 170/69)  BP(mean): --  RR: 17 (29 Aug 2020 20:50) (17 - 18)  SpO2: 96% (29 Aug 2020 20:50) (96% - 98%)    PHYSICAL EXAMINATION:  GENERAL: NAD, thin frail, curled up in bed  HEAD:  Atraumatic, Normocephalic  EYES:  conjunctiva and sclera clear  NECK: Supple, No JVD, Normal thyroid  CHEST/LUNG: Clear to auscultation. Clear to percussion bilaterally; No rales, rhonchi, wheezing, or rubs  HEART: Regular rate and rhythm; No murmurs, rubs, or gallops  ABDOMEN: Soft, Nontender, Nondistended; Bowel sounds present  NERVOUS SYSTEM:  Alert & Oriented X3, good eye contact, flat affect.    EXTREMITIES:  2+ Peripheral Pulses, No clubbing, cyanosis, or edema  SKIN: warm dry          I&O's Summary        CAPILLARY BLOOD GLUCOSE  POCT Blood Glucose.: 311 mg/dL (29 Aug 2020 21:07)    CAPILLARY BLOOD GLUCOSE  POCT Blood Glucose.: 311 mg/dL (29 Aug 2020 21:07)  POCT Blood Glucose.: 281 mg/dL (29 Aug 2020 16:38)  POCT Blood Glucose.: 153 mg/dL (29 Aug 2020 11:28)  POCT Blood Glucose.: 113 mg/dL (29 Aug 2020 07:52)      RADIOLOGY & ADDITIONAL TESTS:

## 2020-08-30 NOTE — CHART NOTE - NSCHARTNOTEFT_GEN_A_CORE
Discussed with Dr. Box, primary attending for this patient who is a 76 y/o female with a PMH of dementia, IDDM, HLD, CAD s/p 3 stents, and legally blind who was admitted for worsening vascular dementia and difficultly for daughter to manage care at home, now developed failure to thrive, decreased po intake, family wants DNR/DNI, palliative care if involved.  Previously treated for depression, but care was discontinued due to pandemics.  Patient was started on Mirtazepine during this admission, TeleCL was consulted to manage depression, which is thought to be possibly contributing to refusal to eat/caring for self.  Per Dr. Box, patient does not have SI/self destructive behavior, is not asking to leave AMA.  Dr. Box agreed to have consult be seen by TeleCL on Monday, but can call back TeleCL at [phone] shall condition changes.

## 2020-08-30 NOTE — CHART NOTE - NSCHARTNOTEFT_GEN_A_CORE
Patient refusing PO BP medication with /79 at 5:44 AM. MD notified and deferred IV anti hypertensive to avoid hypotension. Repeat BP at 6:45 AM improved to 166/77.

## 2020-08-30 NOTE — PROGRESS NOTE ADULT - ATTENDING COMMENTS
Patient seen and examined earlier this morning; Agree with PGY1 A/P above with editing as needed. My independent assessment, findings on exam, diagnosis and plan of care as listed below. discussed with Housestaff    Patient admitted with refusal to eat at home and FTT with intermittent agitation.     Patient is doing better, much more alert and awake, following commands. She is eating somewhat better. She keeps now constantly asking for Daughter Dora; She brought food from home this afternoon and patient actually ate well.   Patient otherwise offers no complaints; denies fever, chills, SOB, palpitations, chest pain, nausea, vomiting, diarrhea, constipation,       Vital Signs Last 24 Hrs  T(C): 36.6 (30 Aug 2020 14:47), Max: 37.1 (29 Aug 2020 20:50)  T(F): 97.8 (30 Aug 2020 14:47), Max: 98.7 (29 Aug 2020 20:50)  HR: 86 (30 Aug 2020 14:47) (79 - 100)  BP: 141/98 (30 Aug 2020 14:47) (141/98 - 191/74)  RR: 16 (30 Aug 2020 14:47) (16 - 18)  SpO2: 100% (30 Aug 2020 14:47) (96% - 100%)    Labs: no new; will try to get in AM prior to discharge    D/D:  Major Depression with refusal to eat likely contributed by Impaired vision slowly improving  Less likely Dementia, likely Cognitive impairment  Type 2 DM uncontrolled with Hyperglycemia despite poor oral intake  Hx CAD s/p stent  HTN    Plan:  Patient is clinically better, eatting somewhat especially with daughter not agitated and no psychosis or hallucinations   I will continue to hold off Aricept and Namenda as these medications can contribute to Psychosis and other side effects  Continue Remeron which will help with Depression, improve appetite as well as sleep.   Spoke with Tele Psych and reviewed with Psychiatrist Dr. Mary; patient will be seen tomorrow. agreed with current management with Mirtazapine.   Patient for Discharge with home Hospice on Monday once consent in place  Increase Lantus to 20 units AM  Nutritional supplement with Glucerna  Try to get labs tomorrow if patient lets; will order tomorrow morning so that patient is not woken up early  Spoke with Daughter Dora and updated today's clinical status and improved mood; Patient also requested to talk to daughter which was arranged over the phone      Discussed with Patient   Discussed with RN Yarielina and PCA.

## 2020-08-31 VITALS
TEMPERATURE: 98 F | DIASTOLIC BLOOD PRESSURE: 59 MMHG | HEART RATE: 85 BPM | RESPIRATION RATE: 16 BRPM | SYSTOLIC BLOOD PRESSURE: 112 MMHG | OXYGEN SATURATION: 100 %

## 2020-08-31 DIAGNOSIS — F33.1 MAJOR DEPRESSIVE DISORDER, RECURRENT, MODERATE: ICD-10-CM

## 2020-08-31 DIAGNOSIS — F01.50 VASCULAR DEMENTIA WITHOUT BEHAVIORAL DISTURBANCE: ICD-10-CM

## 2020-08-31 LAB
GLUCOSE BLDC GLUCOMTR-MCNC: 169 MG/DL — HIGH (ref 70–99)
GLUCOSE BLDC GLUCOMTR-MCNC: 184 MG/DL — HIGH (ref 70–99)
GLUCOSE BLDC GLUCOMTR-MCNC: 301 MG/DL — HIGH (ref 70–99)

## 2020-08-31 PROCEDURE — 86769 SARS-COV-2 COVID-19 ANTIBODY: CPT

## 2020-08-31 PROCEDURE — 82746 ASSAY OF FOLIC ACID SERUM: CPT

## 2020-08-31 PROCEDURE — 99239 HOSP IP/OBS DSCHRG MGMT >30: CPT | Mod: GC

## 2020-08-31 PROCEDURE — 36415 COLL VENOUS BLD VENIPUNCTURE: CPT

## 2020-08-31 PROCEDURE — 72125 CT NECK SPINE W/O DYE: CPT

## 2020-08-31 PROCEDURE — 86780 TREPONEMA PALLIDUM: CPT

## 2020-08-31 PROCEDURE — 84100 ASSAY OF PHOSPHORUS: CPT

## 2020-08-31 PROCEDURE — 80048 BASIC METABOLIC PNL TOTAL CA: CPT

## 2020-08-31 PROCEDURE — 90792 PSYCH DIAG EVAL W/MED SRVCS: CPT | Mod: 95

## 2020-08-31 PROCEDURE — U0003: CPT

## 2020-08-31 PROCEDURE — 81001 URINALYSIS AUTO W/SCOPE: CPT

## 2020-08-31 PROCEDURE — 80053 COMPREHEN METABOLIC PANEL: CPT

## 2020-08-31 PROCEDURE — 87086 URINE CULTURE/COLONY COUNT: CPT

## 2020-08-31 PROCEDURE — 99285 EMERGENCY DEPT VISIT HI MDM: CPT | Mod: 25

## 2020-08-31 PROCEDURE — 82550 ASSAY OF CK (CPK): CPT

## 2020-08-31 PROCEDURE — 93005 ELECTROCARDIOGRAM TRACING: CPT

## 2020-08-31 PROCEDURE — 80061 LIPID PANEL: CPT

## 2020-08-31 PROCEDURE — 83735 ASSAY OF MAGNESIUM: CPT

## 2020-08-31 PROCEDURE — 84443 ASSAY THYROID STIM HORMONE: CPT

## 2020-08-31 PROCEDURE — 70450 CT HEAD/BRAIN W/O DYE: CPT

## 2020-08-31 PROCEDURE — 82607 VITAMIN B-12: CPT

## 2020-08-31 PROCEDURE — 85027 COMPLETE CBC AUTOMATED: CPT

## 2020-08-31 PROCEDURE — 82962 GLUCOSE BLOOD TEST: CPT

## 2020-08-31 PROCEDURE — 83036 HEMOGLOBIN GLYCOSYLATED A1C: CPT

## 2020-08-31 PROCEDURE — 84484 ASSAY OF TROPONIN QUANT: CPT

## 2020-08-31 RX ORDER — ENOXAPARIN SODIUM 100 MG/ML
15 INJECTION SUBCUTANEOUS
Qty: 1 | Refills: 0
Start: 2020-08-31 | End: 2020-09-29

## 2020-08-31 RX ORDER — MIRTAZAPINE 45 MG/1
1 TABLET, ORALLY DISINTEGRATING ORAL
Qty: 30 | Refills: 0
Start: 2020-08-31 | End: 2020-09-29

## 2020-08-31 RX ORDER — INSULIN LISPRO 100/ML
5 VIAL (ML) SUBCUTANEOUS
Qty: 1 | Refills: 0
Start: 2020-08-31 | End: 2020-09-29

## 2020-08-31 RX ORDER — INSULIN LISPRO 100/ML
3 VIAL (ML) SUBCUTANEOUS
Qty: 30 | Refills: 0
Start: 2020-08-31 | End: 2020-09-29

## 2020-08-31 RX ORDER — LISINOPRIL 2.5 MG/1
1 TABLET ORAL
Qty: 30 | Refills: 0
Start: 2020-08-31 | End: 2020-09-29

## 2020-08-31 RX ORDER — ASPIRIN/CALCIUM CARB/MAGNESIUM 324 MG
1 TABLET ORAL
Qty: 30 | Refills: 0
Start: 2020-08-31 | End: 2020-09-29

## 2020-08-31 RX ORDER — OLANZAPINE 15 MG/1
1 TABLET, FILM COATED ORAL
Qty: 30 | Refills: 0
Start: 2020-08-31 | End: 2020-09-29

## 2020-08-31 RX ORDER — INSULIN LISPRO 100/ML
5 VIAL (ML) SUBCUTANEOUS
Qty: 1 | Refills: 0
Start: 2020-08-31 | End: 2020-10-29

## 2020-08-31 RX ADMIN — LISINOPRIL 5 MILLIGRAM(S): 2.5 TABLET ORAL at 05:29

## 2020-08-31 RX ADMIN — Medication 81 MILLIGRAM(S): at 11:25

## 2020-08-31 RX ADMIN — INSULIN GLARGINE 16 UNIT(S): 100 INJECTION, SOLUTION SUBCUTANEOUS at 08:03

## 2020-08-31 RX ADMIN — Medication 4: at 17:15

## 2020-08-31 RX ADMIN — Medication 1: at 08:03

## 2020-08-31 RX ADMIN — Medication 3 UNIT(S): at 08:03

## 2020-08-31 RX ADMIN — Medication 3 UNIT(S): at 17:15

## 2020-08-31 NOTE — CHART NOTE - NSCHARTNOTEFT_GEN_A_CORE
========================  COLLATERAL   ========================  NAME: Dora Graf  NUMBER: [phone]  RELATIONSHIP: Daughter   RELIABITY:   COMMENTS:      HPI:    Collateral notes that at baseline patient has a PMHx of dementia, depression, anxiety and diabetes. Relays that her first psychiatric hospitalization was about 5 years ago at [place] where she was admitted for a few months due to psychosis – delusions/hallucinations. Relays that at the time patient was diagnosed with Dementia and started on an antidepressant. She notes that about 5 years prior patient was diagnosed with diabetes resulting in minor vision loss. Around that time, patient had undergone a series of eye laser procedures with minimal relief as patients sight continued to slowly deteriorate. As of late, patient has now lost majority of her vision and is possibly only able to see figures. Daughter relays that she is unable to confirm the extent of the vision loss, but states that for the past 6 weeks patient has been increasingly nervous, anxious and scared stating she has periods where she only sees black or is unable to see anything at all. Because of this, she notes that patient has been screaming out for help, despite having 24hr care (has a HHA 12 hrs a day,  and daughter are present in the evening). Daughter relays that in addition to her deteriorating vision, patient’s dementia is progressively worsening. States that for the past 6 weeks, patient states that her “brain is empty” and she frequently asks family to remind her of their family vacations and other memorable moments. States that due to her inability to remember things and see, patient has been saying that she doesn’t want to live but doesn’t have the will to actually kill herself. She notes that for the past 6 weeks patient has been eating on some days and other days she states she doesn’t want to eat. Relays that patient is prescribed Bupropione (dosage unknown) but at baseline and for most of her life, has disliked taking any kind of pill and for this reason daughter states that she has to cut up the pills and sprinkle them into her food; She relays that patient was responding well to the Bupriopione but dislikes the taste and therefore refuses to take it in both pill and cut up form. She notes that because she refuses to eat on some days patient has lost a few pounds which is the reason for her admission. She denies any further SA/HI/manic/depressive/anxiety/psychotic symptoms.

## 2020-08-31 NOTE — BEHAVIORAL HEALTH ASSESSMENT NOTE - NSBHCHARTREVIEWINVESTIGATE_PSY_A_CORE FT
< from: 12 Lead ECG (08.25.20 @ 22:48) >    Ventricular Rate 78 BPM    Atrial Rate 78 BPM    P-R Interval 132 ms    QRS Duration 90 ms    Q-T Interval 398 ms    QTC Calculation(Bezet) 453 ms    P Axis 59 degrees    R Axis 39 degrees    T Axis 57 degrees    Diagnosis Line Normal sinus rhythm  Normal ECG    < end of copied text >

## 2020-08-31 NOTE — BEHAVIORAL HEALTH ASSESSMENT NOTE - NSBHCHARTREVIEWLAB_PSY_A_CORE FT
Complete Blood Count (08.26.20 @ 06:07)    Nucleated RBC: 0 /100 WBCs    WBC Count: 6.28 K/uL    RBC Count: 4.25 M/uL    Hemoglobin: 11.8 g/dL    Hematocrit: 36.8 %    Mean Cell Volume: 86.6 fl    Mean Cell Hemoglobin: 27.8 pg    Mean Cell Hemoglobin Conc: 32.1 gm/dL    Red Cell Distrib Width: 13.2 %    Platelet Count - Automated: 349 K/uL    Comprehensive Metabolic Panel (08.26.20 @ 00:30)    Sodium, Serum: 135 mmol/L    Potassium, Serum: 3.6 mmol/L    Chloride, Serum: 98 mmol/L    Carbon Dioxide, Serum: 29 mmol/L    Anion Gap, Serum: 8 mmol/L    Blood Urea Nitrogen, Serum: 21 mg/dL    Creatinine, Serum: 1.16 mg/dL    Glucose, Serum: 291 mg/dL    Calcium, Total Serum: 8.8 mg/dL    Protein Total, Serum: 6.9 g/dL    Albumin, Serum: 3.2 g/dL    Bilirubin Total, Serum: 0.4 mg/dL    Alkaline Phosphatase, Serum: 107 U/L    Aspartate Aminotransferase (AST/SGOT): 15 U/L    Alanine Aminotransferase (ALT/SGPT): 12 U/L DA    eGFR if Non : 46

## 2020-08-31 NOTE — PROGRESS NOTE ADULT - PROBLEM SELECTOR PLAN 4
- Patient on Insulin at home ( Lantus 15 , lispro 7premeals)   - Patient still having blood sugars of 200s-300s on Lantus 15 at bedtime and mild HSS   - will increase lantus to 20  - Ha1c: 9.2 - Patient on Insulin at home ( Lantus 15 , lispro 7premeals)   - on Lantus 20 at bedtime and mild HSS   - Ha1c: 9.2

## 2020-08-31 NOTE — GOALS OF CARE CONVERSATION - ADVANCED CARE PLANNING - CONVERSATION DETAILS
Monday 8.31: [facility]: Patient will be discharged to home hospice services today. Samir Patterson RN.

## 2020-08-31 NOTE — PROGRESS NOTE ADULT - PROBLEM SELECTOR PLAN 1
- Acute on chronic cognitive dysfunction likely 2/2 severe depression  - CTH  unremarkable   - CBC unremarkable   - UA, UCx: unremarkable  - CBC wnl, CMP unremarkable, syphillis screen: negative  - Albumin 3.2  - TSH, Folate, B12, ck: WNL  - psych history: patient with h/o severe circadian rhythm disturbance, vascular dementia, previously on antidepressants including, mirtazapine, wellbutrin, buproprion, buspirone, doxepin, sertraline, and trazadone   will continue with home dose melatonin, bupropion , memantine  - SW consulted   - Palliative care recommends home hospice - Acute on chronic cognitive dysfunction likely 2/2 severe depression  - CTH  unremarkable   - CBC unremarkable   - UA, UCx: unremarkable  - CBC wnl, CMP unremarkable, syphillis screen: negative  - Albumin 3.2  - TSH, Folate, B12, ck: WNL  - psych history: patient with h/o severe circadian rhythm disturbance, vascular dementia, previously on antidepressants including, mirtazapine, wellbutrin, buproprion, buspirone, doxepin, sertraline, and trazadone  - will continue with home dose melatonin  - will DC the memantine   - As per Psychiatry: will continue with mirtazapine and start zyprexa at bedtime (7pm)  - SW consulted   - Palliative care recommends home hospice

## 2020-08-31 NOTE — BEHAVIORAL HEALTH ASSESSMENT NOTE - NSBHCONSULTFOLLOWAFTERCARE_PSY_A_CORE FT
Patient being discharged with home hospice so can further titrate Remeron and Zyprexa on an outpatient basis according to her response.

## 2020-08-31 NOTE — PROGRESS NOTE ADULT - PROBLEM SELECTOR PLAN 2
- Likley secondary to mental status decline  - Patient refusing to eat, probably 2/2 depression  - Passed dysphagia screen on ED  - Currently on soft diet and Glucerna supplement as per Nutrition consult  - IV Fluids  - Will start Mirtazapine for its appetite stimulation and anti-depression effects  - Nutrition consult  - Palliative consult  - Social care consult
- secondary to mental status decline vs. depression due to blindness  - Patient refusing to eat, probably 2/2 depression  - Passed dysphagia screen on ED  - Currently on soft diet and Glucerna supplement as per Nutrition consult  - IV Fluids  - Will start Mirtazapine for its appetite stimulation and anti-depression effects  - Nutrition consult  - Palliative consult  - Social care consult
- secondary to mental status decline vs. depression due to blindness  - Patient refusing to eat, probably 2/2 depression  - Passed dysphagia screen on ED  - Currently on soft diet and Glucerna supplement as per Nutrition consult  - IV Fluids  - Will start Mirtazapine for its appetite stimulation and anti-depression effects  - Nutrition consult  - Palliative consult  - Social care consult
- Likley secondary to mental status decline  - Patient refusing to eat, probably 2/2 depression  - Passed dysphagia screen on ED  - Currently on soft diet and Glucerna supplement as per Nutrition consult  - IV Fluids  - Will start Mirtazapine for its appetite stimulation and anti-depression effects  - Nutrition consult  - Palliative consult  - Social care consult

## 2020-08-31 NOTE — BEHAVIORAL HEALTH ASSESSMENT NOTE - PRIMARY DX
How Severe Is It?: severe
Is This A New Presentation, Or A Follow-Up?: Follow Up Isotretinoin
Display Cumulative Dose In The Note?: Yes
When Was Your Last Visit?: 09/27/2019
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

## 2020-08-31 NOTE — BEHAVIORAL HEALTH ASSESSMENT NOTE - RISK ASSESSMENT
Low Acute Suicide Risk Risk include mood symptoms and history of such with passive suicidality. However, no history of suicidal behaviors or attempts, spontaneously expressive of reasons would never engage in suicide and ample supervision and supports in place at home.

## 2020-08-31 NOTE — BEHAVIORAL HEALTH ASSESSMENT NOTE - ORIENTATION OTHER
Patient was fully oriented to time stating the date as Monday 31s and month as "8", year as 2020. She did not recall the hospital she was in however or the details that prompted admission.

## 2020-08-31 NOTE — PROGRESS NOTE ADULT - ASSESSMENT
77 yo F from home lives with  and armindahter ambulates with walker at baseline, HHA 12 hours  PMH dementia, IDDM, HLD, CAD sp 3 stents, legally blind presented with decline in cognitive function X6 weeks and worsening agitation. On my evaluation patient is AAOX 2-3, daughter  Reported patient refused to eat ,drink and having her medications. Patient c/o new onset neck pain 6/10 , aggravates with movements. Patient states she does  not have any appetite and lost weight. Denies fever, chills, palpitation chest wendy , urinary sx, N/V/ D or any other acute complains . patient admitted for FTT
79 yo F from home lives with  and armindahter ambulates with walker at baseline, HHA 12 hours  PMH dementia, IDDM, HLD, CAD sp 3 stents, legally blind presented with decline in cognitive function X6 weeks and worsening agitation. On my evaluation patient is AAOX 2-3, daughter  Reported patient refused to eat ,drink and having her medications. Patient c/o new onset neck pain 6/10 , aggravates with movements. Patient states she does  not have any appetite and lost weight. Denies fever, chills, palpitation chest wendy , urinary sx, N/V/ D or any other acute complains . patient admitted for FTT
79 yo F from home lives with  and armindahter ambulates with walker at baseline, HHA 12 hours  PMH dementia, IDDM, HLD, CAD sp 3 stents, legally blind presented with decline in cognitive function X6 weeks and worsening agitation. On my evaluation patient is AAOX 2-3, daughter  Reported patient refused to eat ,drink and having her medications. Patient c/o new onset neck pain 6/10 , aggravates with movements. Patient states she does  not have any appetite and lost weight. Denies fever, chills, palpitation chest wendy , urinary sx, N/V/ D or any other acute complains . patient admitted for FTT
77 yo F from home lives with  and armindahter ambulates with walker at baseline, HHA 12 hours  PMH dementia, IDDM, HLD, CAD sp 3 stents, legally blind presented with decline in cognitive function X6 weeks and worsening agitation. On my evaluation patient is AAOX 2-3, daughter  Reported patient refused to eat ,drink and having her medications. Patient c/o new onset neck pain 6/10 , aggravates with movements. Patient states she does  not have any appetite and lost weight. Denies fever, chills, palpitation chest wendy , urinary sx, N/V/ D or any other acute complains . patient admitted for FTT

## 2020-08-31 NOTE — BEHAVIORAL HEALTH ASSESSMENT NOTE - DETAILS
as above; pt relays *** as above; pt relays sometimes feels sad and feels like dying or taking a bunch of pills; relays would never do it because of her kids. impaired cognition blind

## 2020-08-31 NOTE — BEHAVIORAL HEALTH ASSESSMENT NOTE - NSBHCHARTREVIEWIMAGING_PSY_A_CORE FT
< from: CT Head No Cont (08.26.20 @ 00:24) >    Head CT Findings:  The ventricles and sulci are prominent in size, compatible with mild generalized parenchymal volume loss. No acute intracranial hemorrhage is identified.  No extra-axial fluid collection is identified.  No mass effect or midline shift is seen.  There is no evidence of acute territorial infarct.  There are periventricular and subcortical white matter hypodensities, which are nonspecific, but likely reflect mild microvascular ischemic disease.  The visualized paranasal sinuses are clear. The mastoid air cells are well aerated on the left and partially opacified on the right.  No acute osseous abnormality is seen. 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    Cervical Spine CT Findings:  Straightening of usual cervical lordosis is likely related to muscle spasm or positioning. There is mild retrolisthesis at C4-C5. There is mild invagination. There is no acute displaced fracture.  There is no prevertebral soft tissue swelling.  The vertebral body heights are maintained.  There aremultilevel degenerative changes characterized by disc osteophyte complexes and facet and uncinate hypertrophy. This results in mild canal stenosis and multilevel neural foraminal narrowing.    Impression: No CT evidence of acute intracranial abnormality or acute osseous abnormality in the cervical spine.    < end of copied text >

## 2020-08-31 NOTE — PROGRESS NOTE ADULT - SUBJECTIVE AND OBJECTIVE BOX
PGY-1 Progress Note discussed with attending    PAGER #: [[phone]] TILL 5:00 PM  PLEASE CONTACT ON CALL TEAM:  - On Call Team (Please refer to Geeta) FROM 5:00 PM - 8:30PM  - Nightfloat Team FROM 8:30 -7:30 AM    CHIEF COMPLAINT & BRIEF HOSPITAL COURSE:  77 yo F from home lives with  and daughter ambulates with walker at baseline, HHA 12 hours. PMH dementia, IDDM, HLD, CAD sp 3 stents, depression, legally blind presented with decline in cognitive function X6 weeks, worsening agitation, refusal to eat and failure to thrive. Reported patient refused to eat ,drink and having her medications. CT head and spine are negative for any acute pathology, they show chronic microvascular changes, fibrous dysplasia, and mild canal stenosis with multilevel foramen narrowing. Dietician evaluated the patient and recommend patient be in soft diet, with glucerna supplementation. Palliative care evaluated the patient, patient is now DNR/DNI, they family does not want feeding tubes, palliative care recommends home hospice.  was consulted.   patient will be discharge home with home hospice.       INTERVAL HPI/OVERNIGHT EVENTS:   patient seen and examined at bedside. her blood pressure is elevated in the am at 178/67, she is refusing blood pressure medicine as well as her insuline, and other meds. Today she looks much better, is more responsive for questioning, endorsing being hungry and asking for food. Patient is refusing blood draws at the moment, she mentions she will allow it when her daughter comes, and as so we will try in the afternoon.     REVIEW OF SYSTEMS:  CONSTITUTIONAL: +  blindness, No fever, weight loss, or fatigue,  RESPIRATORY: No cough, wheezing, chills or hemoptysis; No shortness of breath  CARDIOVASCULAR: No chest pain, palpitations, dizziness, or leg swelling  GASTROINTESTINAL: No abdominal pain. No nausea, vomiting, or hematemesis; No diarrhea or constipation. No melena or hematochezia.  GENITOURINARY: No dysuria or hematuria, urinary frequency  MUSCULOSKELETAL: No pain, no Limited ROM  NEUROLOGICAL: No headaches, memory loss, loss of strength, numbness, or tremors  SKIN: No itching, burning, rashes, or lesions     MEDICATIONS  (STANDING):  aspirin  chewable 81 milliGRAM(s) Oral daily  atorvastatin 40 milliGRAM(s) Oral at bedtime  dextrose 5%. 1000 milliLiter(s) (50 mL/Hr) IV Continuous <Continuous>  dextrose 50% Injectable 12.5 Gram(s) IV Push once  dextrose 50% Injectable 25 Gram(s) IV Push once  dextrose 50% Injectable 25 Gram(s) IV Push once  enoxaparin Injectable 40 milliGRAM(s) SubCutaneous daily  insulin glargine Injectable (LANTUS) 16 Unit(s) SubCutaneous every morning  insulin lispro (HumaLOG) corrective regimen sliding scale   SubCutaneous at bedtime  insulin lispro (HumaLOG) corrective regimen sliding scale   SubCutaneous three times a day before meals  insulin lispro Injectable (HumaLOG) 3 Unit(s) SubCutaneous three times a day with meals  lisinopril 5 milliGRAM(s) Oral daily  melatonin 3 milliGRAM(s) Oral at bedtime  mirtazapine 15 milliGRAM(s) Oral at bedtime  sodium chloride 0.9%. 1000 milliLiter(s) (100 mL/Hr) IV Continuous <Continuous>    MEDICATIONS  (PRN):  dextrose 40% Gel 15 Gram(s) Oral once PRN Blood Glucose LESS THAN 70 milliGRAM(s)/deciliter  glucagon  Injectable 1 milliGRAM(s) IntraMuscular once PRN Glucose LESS THAN 70 milligrams/deciliter      Vital Signs Last 24 Hrs  T(C): 36.6 (31 Aug 2020 05:41), Max: 36.6 (30 Aug 2020 14:47)  T(F): 97.8 (31 Aug 2020 05:41), Max: 97.8 (30 Aug 2020 14:47)  HR: 76 (31 Aug 2020 08:29) (76 - 95)  BP: 151/66 (31 Aug 2020 08:29) (141/98 - 178/67)  BP(mean): --  RR: 18 (31 Aug 2020 08:29) (16 - 18)  SpO2: 98% (31 Aug 2020 08:29) (98% - 100%)    PHYSICAL EXAMINATION:  GENERAL: NAD, thin,   HEAD:  Atraumatic, Normocephalic  EYES:  conjunctiva and sclera clear  NECK: Supple, No JVD, Normal thyroid  CHEST/LUNG: Clear to auscultation. Clear to percussion bilaterally; No rales, rhonchi, wheezing, or rubs  HEART: Regular rate and rhythm; No murmurs, rubs, or gallops  ABDOMEN: Soft, Nontender, Nondistended; Bowel sounds present  NERVOUS SYSTEM:  Alert & Oriented X3,    EXTREMITIES:  2+ Peripheral Pulses, No clubbing, cyanosis, or edema  SKIN: warm dry                    I&O's Summary        RADIOLOGY & ADDITIONAL TESTS: PGY-1 Progress Note discussed with attending    PAGER #: [[phone]] TILL 5:00 PM  PLEASE CONTACT ON CALL TEAM:  - On Call Team (Please refer to Geeta) FROM 5:00 PM - 8:30PM  - Nightfloat Team FROM 8:30 -7:30 AM    CHIEF COMPLAINT & BRIEF HOSPITAL COURSE:  79 yo F from home lives with  and daughter ambulates with walker at baseline, HHA 12 hours. PMH dementia, IDDM, HLD, CAD sp 3 stents, depression, legally blind presented with decline in cognitive function X6 weeks, worsening agitation, refusal to eat and failure to thrive. Reported patient refused to eat ,drink and having her medications. CT head and spine are negative for any acute pathology, they show chronic microvascular changes, fibrous dysplasia, and mild canal stenosis with multilevel foramen narrowing. Dietician evaluated the patient and recommend patient be in soft diet, with glucerna supplementation. Palliative care evaluated the patient, patient is now DNR/DNI, they family does not want feeding tubes, palliative care recommends home hospice.  was consulted.   Patient will be discharge home with home hospice.     INTERVAL HPI/OVERNIGHT EVENTS:   Patient seen and examined at bedside. Her blood pressure is elevated in the am at 178/67, she is refusing blood pressure medicine as well as her insuline, and other meds. Today she looks much better, is more responsive for questioning, endorsing being hungry and asking for food. Patient is refusing blood draws at the moment, she mentions she will allow it when her daughter comes, and as so we will try in the afternoon.     REVIEW OF SYSTEMS:  CONSTITUTIONAL: +  blindness, No fever, weight loss, or fatigue,  RESPIRATORY: No cough, wheezing, chills or hemoptysis; No shortness of breath  CARDIOVASCULAR: No chest pain, palpitations, dizziness, or leg swelling  GASTROINTESTINAL: No abdominal pain. No nausea, vomiting, or hematemesis; No diarrhea or constipation. No melena or hematochezia.  GENITOURINARY: No dysuria or hematuria, urinary frequency  MUSCULOSKELETAL: No pain, no Limited ROM  NEUROLOGICAL: No headaches, memory loss, loss of strength, numbness, or tremors  SKIN: No itching, burning, rashes, or lesions     MEDICATIONS  (STANDING):  aspirin  chewable 81 milliGRAM(s) Oral daily  atorvastatin 40 milliGRAM(s) Oral at bedtime  dextrose 5%. 1000 milliLiter(s) (50 mL/Hr) IV Continuous <Continuous>  dextrose 50% Injectable 12.5 Gram(s) IV Push once  dextrose 50% Injectable 25 Gram(s) IV Push once  dextrose 50% Injectable 25 Gram(s) IV Push once  enoxaparin Injectable 40 milliGRAM(s) SubCutaneous daily  insulin glargine Injectable (LANTUS) 16 Unit(s) SubCutaneous every morning  insulin lispro (HumaLOG) corrective regimen sliding scale   SubCutaneous at bedtime  insulin lispro (HumaLOG) corrective regimen sliding scale   SubCutaneous three times a day before meals  insulin lispro Injectable (HumaLOG) 3 Unit(s) SubCutaneous three times a day with meals  lisinopril 5 milliGRAM(s) Oral daily  melatonin 3 milliGRAM(s) Oral at bedtime  mirtazapine 15 milliGRAM(s) Oral at bedtime  sodium chloride 0.9%. 1000 milliLiter(s) (100 mL/Hr) IV Continuous <Continuous>    MEDICATIONS  (PRN):  dextrose 40% Gel 15 Gram(s) Oral once PRN Blood Glucose LESS THAN 70 milliGRAM(s)/deciliter  glucagon  Injectable 1 milliGRAM(s) IntraMuscular once PRN Glucose LESS THAN 70 milligrams/deciliter      Vital Signs Last 24 Hrs  T(C): 36.6 (31 Aug 2020 05:41), Max: 36.6 (30 Aug 2020 14:47)  T(F): 97.8 (31 Aug 2020 05:41), Max: 97.8 (30 Aug 2020 14:47)  HR: 76 (31 Aug 2020 08:29) (76 - 95)  BP: 151/66 (31 Aug 2020 08:29) (141/98 - 178/67)  BP(mean): --  RR: 18 (31 Aug 2020 08:29) (16 - 18)  SpO2: 98% (31 Aug 2020 08:29) (98% - 100%)    PHYSICAL EXAMINATION:  GENERAL: NAD, thin,   HEAD:  Atraumatic, Normocephalic  EYES:  conjunctiva and sclera clear  NECK: Supple, No JVD, Normal thyroid  CHEST/LUNG: Clear to auscultation. Clear to percussion bilaterally; No rales, rhonchi, wheezing, or rubs  HEART: Regular rate and rhythm; No murmurs, rubs, or gallops  ABDOMEN: Soft, Nontender, Nondistended; Bowel sounds present  NERVOUS SYSTEM:  Alert & Oriented X3,    EXTREMITIES:  2+ Peripheral Pulses, No clubbing, cyanosis, or edema  SKIN: warm dry                    I&O's Summary        RADIOLOGY & ADDITIONAL TESTS:

## 2020-08-31 NOTE — BEHAVIORAL HEALTH ASSESSMENT NOTE - NSBHSOCIALHXDETAILSFT_PSY_A_CORE
As per HPI. 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 She reports living with her daughter but fails to mention her . She relays having 3 chidlren

## 2020-08-31 NOTE — PROGRESS NOTE ADULT - PROBLEM SELECTOR PLAN 3
- Patient with h/o Cad and stent X3 on aspirin, statin and Acei  - will continue with home meds

## 2020-08-31 NOTE — BEHAVIORAL HEALTH ASSESSMENT NOTE - NSBHCONSULTMEDS_PSY_A_CORE FT
Continue Remeron at 15 mg HS  Start Zyprexa 2.5 mg HS to further target mood, appetite and sleep with added benefits to underlying intermittent psychotic sx Continue Remeron at 15 mg HS  Discontinue Bupropion  Start Zyprexa 2.5 mg HS to further target mood, appetite and sleep with added benefits to underlying intermittent psychotic sx

## 2020-08-31 NOTE — BEHAVIORAL HEALTH ASSESSMENT NOTE - NSBHCHARTREVIEWVS_PSY_A_CORE FT
Vital Signs Last 24 Hrs  T(C): 36.6 (31 Aug 2020 18:36), Max: 36.6 (31 Aug 2020 05:41)  T(F): 97.9 (31 Aug 2020 18:36), Max: 97.9 (31 Aug 2020 18:36)  HR: 85 (31 Aug 2020 18:36) (76 - 95)  BP: 112/59 (31 Aug 2020 18:36) (112/59 - 178/67)  BP(mean): --  RR: 16 (31 Aug 2020 18:36) (16 - 18)  SpO2: 100% (31 Aug 2020 18:36) (95% - 100%)

## 2020-08-31 NOTE — BEHAVIORAL HEALTH ASSESSMENT NOTE - HPI (INCLUDE ILLNESS QUALITY, SEVERITY, DURATION, TIMING, CONTEXT, MODIFYING FACTORS, ASSOCIATED SIGNS AND SYMPTOMS)
past psychiatric history of neurocognitive disorder, unspecified vs major depressive disorder and unspecified psychosis, no known prior psychiatric admissions or suicide attempts, no legal history or history of violence, no substance use and past medical history of *** This is a 75 year old  female, domiciled with  and daughter with part time HHA, non-caregiver, past psychiatric history of neurocognitive disorder, unspecified vs major depressive disorder and unspecified psychosis, no known prior psychiatric admissions or suicide attempts, no legal history or history of violence, no substance use and past medical history of IDDM, HLD, CAD s/p 3 stents, and visual impairment (legally blind) who was admitted on 8/26 with further decline in cognitive function and worsening agitation. She has been noted to have decreased PO intake /refusal, also refusing other care elements, concern for FTT thought to be attributable to depression for which psychiatry has been consulted. This is a 75 year old  female, domiciled with  and daughter with part time HHA, non-caregiver, past psychiatric history of neurocognitive disorder, unspecified vs major depressive disorder and unspecified psychosis, no known prior psychiatric admissions or suicide attempts, no legal history or history of violence, no substance use and past medical history of IDDM, HLD, CAD s/p 3 stents, and visual impairment (legally blind) who was admitted on 8/26 with further decline in cognitive function and worsening agitation. She has been noted to have decreased PO intake /refusal, also refusing other care elements, concern for FTT thought to be attributable to depression for which psychiatry has been consulted.    On assessment, patient initially says she has been "doing good" but then with questioning regarding depression, she relays "I don't like my life and I want to die." She states that she just doesn't feel happy anymore and that this started "a long time ago." She says that sometimes she thinks "about buying a lot of pills and taking them" but says "I wouldn't do it because of my children." She relays that she feels this way when she is sad and notes that "sometimes I feel good, sometimes I feel bad." She reports poor sleep, poor appetite and relays her energy is good sometimes, bad at others. She denies any HI, AVH or hallucinations. She says that she doesn't want any medicine to help her feel better because "I don't like too much medicine." She initially denies psych history or prior meds when asked but in reference to medications later states that she felt more sick last sukhdev she was given meds for depression. She denies any alcohol, cigarettes or substance use.     Collateral from daughter: Collateral notes that at baseline patient has a PMHx of dementia, depression, anxiety and diabetes. Relays that her first psychiatric hospitalization was about 5 years ago at [place] where she was admitted for a few months due to psychosis – delusions/hallucinations. Relays that at the time patient was diagnosed with Dementia and started on an antidepressant. She notes that about 5 years prior patient was diagnosed with diabetes resulting in minor vision loss. Around that time, patient had undergone a series of eye laser procedures with minimal relief as patients sight continued to slowly deteriorate. As of late, patient has now lost majority of her vision and is possibly only able to see figures. Daughter relays that she is unable to confirm the extent of the vision loss, but states that for the past 6 weeks patient has been increasingly nervous, anxious and scared stating she has periods where she only sees black or is unable to see anything at all. Because of this, she notes that patient has been screaming out for help, despite having 24hr care (has a HHA 12 hrs a day,  and daughter are present in the evening). Daughter relays that in addition to her deteriorating vision, patient’s dementia is progressively worsening. States that for the past 6 weeks, patient states that her “brain is empty” and she frequently asks family to remind her of their family vacations and other memorable moments. States that due to her inability to remember things and see, patient has been saying that she doesn’t want to live but doesn’t have the will to actually kill herself. She notes that for the past 6 weeks patient has been eating on some days and other days she states she doesn’t want to eat. Relays that patient is prescribed Bupropion (dosage unknown) but at baseline and for most of her life, has disliked taking any kind of pill and for this reason daughter states that she has to cut up the pills and sprinkle them into her food; She relays that patient was responding well to the Bupropion but dislikes the taste and therefore refuses to take it in both pill and cut up form. She notes that because she refuses to eat on some days patient has lost a few pounds which is the reason for her admission. She denies any further SA/HI/manic/depressive/anxiety/psychotic symptoms. This is a 75 year old  female, domiciled with  and daughter with part time HHA, non-caregiver, past psychiatric history of neurocognitive disorder, unspecified vs major depressive disorder and unspecified psychosis, no known prior psychiatric admissions or suicide attempts, no legal history or history of violence, no substance use and past medical history of IDDM, HLD, CAD s/p 3 stents, and visual impairment (legally blind) who was admitted on 8/26 with further decline in cognitive function and worsening agitation. She has been noted to have decreased PO intake /refusal, also refusing other care elements, concern for FTT thought to be attributable to depression for which psychiatry has been consulted.    On assessment, patient initially says she has been "doing good" but then with questioning regarding depression, she relays "I don't like my life and I want to die." She states that she just doesn't feel happy anymore and that this started "a long time ago." She says that sometimes she thinks "about buying a lot of pills and taking them" but says "I wouldn't do it because of my children." She relays that she feels this way when she is sad and notes that "sometimes I feel good, sometimes I feel bad." She reports poor sleep, poor appetite and relays her energy is good sometimes, bad at others. She denies any HI, AVH or hallucinations. She says that she doesn't want any medicine to help her feel better because "I don't like too much medicine." She initially denies psych history or prior meds when asked but in reference to medications later states that she felt more sick last sukhdev she was given meds for depression. She denies any alcohol, cigarettes or substance use.     Collateral from Nursing: Relays patient is only alert to her self and intermittently aware she is in the hospital, other times thinks she’s at home, screaming move the table and there’s no one or no table there. Speaks both English and Upper sorbian and intermixes the two. She is also restless at times and requires patience for her to maintain a calm nature. Can understand English well. Was screaming all night. She has been a poor eater but this morning was screaming for food, was given bread and crackers, loses interest if what she wants isn’t readily available. Concerns about falling out of bed and poor PO intake. She has not expressed any SI or HI. Pt speaks very quickly but responds best to slow speech from others. No paranoia. Calls out for her family members. She’s legally blind. Patient started on Mirtazapine this admission and noted to have improved appetite.     Collateral from daughter: Collateral notes that at baseline patient has a PMHx of dementia, depression, anxiety and diabetes. Relays that her first psychiatric hospitalization was about 5 years ago at [place] where she was admitted for a few months due to psychosis – delusions/hallucinations. Relays that at the time patient was diagnosed with Dementia and started on an antidepressant. She notes that about 5 years prior patient was diagnosed with diabetes resulting in minor vision loss. Around that time, patient had undergone a series of eye laser procedures with minimal relief as patients sight continued to slowly deteriorate. As of late, patient has now lost majority of her vision and is possibly only able to see figures. Daughter relays that she is unable to confirm the extent of the vision loss, but states that for the past 6 weeks patient has been increasingly nervous, anxious and scared stating she has periods where she only sees black or is unable to see anything at all. Because of this, she notes that patient has been screaming out for help, despite having 24hr care (has a HHA 12 hrs a day,  and daughter are present in the evening). Daughter relays that in addition to her deteriorating vision, patient’s dementia is progressively worsening. States that for the past 6 weeks, patient states that her “brain is empty” and she frequently asks family to remind her of their family vacations and other memorable moments. States that due to her inability to remember things and see, patient has been saying that she doesn’t want to live but doesn’t have the will to actually kill herself. She notes that for the past 6 weeks patient has been eating on some days and other days she states she doesn’t want to eat. Relays that patient is prescribed Bupropion (dosage unknown) but at baseline and for most of her life, has disliked taking any kind of pill and for this reason daughter states that she has to cut up the pills and sprinkle them into her food; She relays that patient was responding well to the Bupropion but dislikes the taste and therefore refuses to take it in both pill and cut up form. She notes that because she refuses to eat on some days patient has lost a few pounds which is the reason for her admission. She denies any further SA/HI/manic/depressive/anxiety/psychotic symptoms.

## 2020-08-31 NOTE — BEHAVIORAL HEALTH ASSESSMENT NOTE - SUMMARY
This is a 75 year old  female, domiciled with  and daughter with part time HHA, non-caregiver, past psychiatric history of neurocognitive disorder, unspecified vs major depressive disorder and unspecified psychosis, no known prior psychiatric admissions or suicide attempts, no legal history or history of violence, no substance use and past medical history of IDDM, HLD, CAD s/p 3 stents, and visual impairment (legally blind) who was admitted on 8/26 with further decline in cognitive function and worsening agitation. She has been noted to have decreased PO intake /refusal, also refusing other care elements, concern for FTT thought to be attributable to depression for which psychiatry has been consulted. Remeron has been started this admission and patient has had a partial response to this.     Her assessment evidences gross impairments in memory though currently she is fully oriented to time and person with only partial orientation to place and situation. Collateral relays escalating psychosis and agitation preceding admission while patient readily expresses symptoms of depression with sadness, anhedonia, appetite impairment, lethargy, sleep impairment and passive suicidality which are notably intermittent in nature. She does not evidence an intent to harm herself despite the aforementioned sx such that discharge home with current supports (HHA, hospice care, family supervision) remains appropriate. Nonetheless, her mood and behavioral symptoms are both suggestive of progressive dementia in addition to exacerbated depression. Would suggest further mood/appetite/sleep augmentation with low doses of Zyprexa which could also augment the effects of her Remeron. This would be more favorable given patient's inclination against medications due to concerns of side effects. These benefits, however, would need to be weighed against the BBW risk of increased morbidity/mortality with use of neuroleptics in patient's with dementia though her present concerns of FTT would suggest the benefits currently outweigh the risks.

## 2020-08-31 NOTE — BEHAVIORAL HEALTH ASSESSMENT NOTE - NSHPLANGTRANSLATORFT_GEN_A_CORE
Patient notably speaks both English and Hungarian; [place]  services were used on the Hub[employer] device to facilitate assessment but patient notably preferably spoke in English responding to most questions before  had a chance to translate.   was used more intently to clarify safety questions to ensure patient elaborated appropriately

## 2020-08-31 NOTE — CHART NOTE - NSCHARTNOTEFT_GEN_A_CORE
Reassessment:   75yFemalePatient is a 75y old  Female who presents with a chief complaint of AMS (31 Aug 2020 11:32)      Factors impacting intake: [ ] none [ ] nausea  [ ] vomiting [ ] diarrhea [ ] constipation  [ ]chewing problems [ ] swallowing issues  [X ] other: Failure to thrive, diabetes, dementia, depression.     Diet Presciption: Diet, Consistent Carbohydrate w/Evening Snack:   Soft  DASH/TLC {Sodium & Cholesterol Restricted}  Supplement Feeding Modality:  Oral  Glucerna Shake Cans or Servings Per Day:  1       Frequency:  Two Times a day (20 @ 13:04)    Intake: Visited pt. alert & awake, reports po intake good, consuming >50% of breakfast meals & tolerating, requesting additional foods, obtained food choices within therapeutic diet, encourage intake of nutrition supplement, c/w feeding assistance, diet as ordered & Glucerna Shake 1 can BID as medically feasible, family agreed to home hospice. RD available.       Daily Weight in k.7 (27 Aug 2020 11:54)    % Weight Change: Stable    Pertinent Medications: MEDICATIONS  (STANDING):  aspirin  chewable 81 milliGRAM(s) Oral daily  atorvastatin 40 milliGRAM(s) Oral at bedtime  dextrose 5%. 1000 milliLiter(s) (50 mL/Hr) IV Continuous <Continuous>  dextrose 50% Injectable 12.5 Gram(s) IV Push once  dextrose 50% Injectable 25 Gram(s) IV Push once  dextrose 50% Injectable 25 Gram(s) IV Push once  enoxaparin Injectable 40 milliGRAM(s) SubCutaneous daily  insulin glargine Injectable (LANTUS) 16 Unit(s) SubCutaneous every morning  insulin lispro (HumaLOG) corrective regimen sliding scale   SubCutaneous at bedtime  insulin lispro (HumaLOG) corrective regimen sliding scale   SubCutaneous three times a day before meals  insulin lispro Injectable (HumaLOG) 3 Unit(s) SubCutaneous three times a day with meals  lisinopril 5 milliGRAM(s) Oral daily  melatonin 3 milliGRAM(s) Oral at bedtime  mirtazapine 15 milliGRAM(s) Oral at bedtime  sodium chloride 0.9%. 1000 milliLiter(s) (100 mL/Hr) IV Continuous <Continuous>    MEDICATIONS  (PRN):  dextrose 40% Gel 15 Gram(s) Oral once PRN Blood Glucose LESS THAN 70 milliGRAM(s)/deciliter  glucagon  Injectable 1 milliGRAM(s) IntraMuscular once PRN Glucose LESS THAN 70 milligrams/deciliter    Pertinent Labs:   Phos 2.7 mg/dL  Alb 3.2 g/dL<L>  Chol 123 mg/dL LDL 55 mg/dL HDL 42 mg/dL<L> Trig 130 mg/dL     CAPILLARY BLOOD GLUCOSE      POCT Blood Glucose.: 184 mg/dL (31 Aug 2020 11:30)  POCT Blood Glucose.: 169 mg/dL (31 Aug 2020 07:43)  POCT Blood Glucose.: 255 mg/dL (30 Aug 2020 21:43)  POCT Blood Glucose.: 340 mg/dL (30 Aug 2020 16:52)    Skin: pressure ulcers     Estimated Needs:   [X ] no change since previous assessment  [ ] recalculated:     Previous Nutrition Diagnosis:   [ ] Inadequate Energy Intake [ ]Inadequate Oral Intake [ ] Excessive Energy Intake   [ ] Underweight [ ] Increased Nutrient Needs [ ] Overweight/Obesity   [ ] Altered GI Function [ ] Unintended Weight Loss [ ] Food & Nutrition Related Knowledge Deficit [Severe] Malnutrition     Nutrition Diagnosis is [X] ongoing  [ ] resolved [ ] not applicable     New Nutrition Diagnosis: [ ] not applicable     Interventions: To meet nutrition needs   Recommend  [ ] Change Diet To:  [ ] Nutrition Supplement  [ ] Nutrition Support  [X ] Other: Add MVI/minerals/Vit.C 500 Mg BID for wound healing & as needed Zinc Sulfate 220mg once daily as medically feasible      Monitoring and Evaluation:   [X ] PO intake [ x ] Tolerance to diet prescription [ x ] weights [ x ] labs[ x ] follow up per protocol  [ ] other:

## 2020-09-01 NOTE — CHART NOTE - NSCHARTNOTESELECT_GEN_ALL_CORE
Event Note
Hospitalist Addendum:
Hospitalist Follow up Note
Nutrition Services
Nutrition Services
[facility] - Collateral Note/Event Note

## 2020-09-01 NOTE — CHART NOTE - NSCHARTNOTEFT_GEN_A_CORE
Patient had labs drawn yesterday evening prior to discharge.  Inadvertently reported on to another patient in same room chart    CBC: WBC 5.88, H/H 12/7/40.7, Platelet 408 stable as of prior values  BMP:   LFTs WNL: AST 14 ALT 13 Albumin 2.9 Patient had labs drawn yesterday evening prior to discharge.  Inadvertently reported on to another patient in same room chart    CBC: WBC 5.88, H/H 12/7/40.7, Platelet 408 stable as of prior values  BMP: Na 137, K 3.7, CO2 30, BUN/Cr 17/0.87 Ca 9.1 Glucose 245  LFTs WNL: AST 14 ALT 13 AP 91 Albumin 2.9    Labs stable

## 2020-09-23 NOTE — PROGRESS NOTE ADULT - ASSESSMENT
HPI:  72 F from home, lives with daughter, with HHA 5hours per day 5 days a week PMH of  Dementia, Depression, DM, HTN, HLD, CAD x3 stents last stent in Aug 2016, Glaucoma, legally blind, brought in by Daughter  with worsening dementia, weakness and not eating and drinking sufficiently since May/2017. Pt is sedated due to agitation and poor co-operation currently, not oriented. As per daughter pt is refusing to take any of her meds since 2 months  as she is not eating or drinking much, Patient recent admission in May with the same compliant and this time daughter was asking for long term care placement. Daughter stated that patient has been mostly bedbound for last 6 months as she keep having recurrent falls and unsteady gait. She occasionally went into kitchen but stays in bed most of the time. Daughter stated she does not want PEG tube or any other kind of artificial nutrition. Patient was DNR/DNI on previous admission and daughter wanted to wait until morning until her sister came in to sign the form.   In ED, vitals were stable and pt is hypokalemic to 2.5 without no acute EKG changes and NO U wave. Refusing medication. Patient was sedated and potassium riders were given. (17 Jul 2017 22:51) Was A Bandage Applied: Yes

## 2020-11-25 NOTE — DIETITIAN INITIAL EVALUATION ADULT. - TIME FRAME
Patient, an 84 year old male with PMH of cholecystectomy, ventral hernia, PE off xarelto, varicose vein, chronic back pain, herniated disc, BPH s/p surgery, renal cyst, insomnia, hypothyroid and HTN presents to the ED after syncopal episode. CTH showed volume loss, microvascular disease, no acute hemorrhage or midline shift. EKG showed NSR with 1st degree HB with prolonged CO (226ms) and prolonged QT. Trop x 1 negative.     Patient will be admitted to tele for syncope.
6 weeks

## 2021-01-07 NOTE — PROGRESS NOTE ADULT - SUBJECTIVE AND OBJECTIVE BOX
Spoke with pt and advised him of Dr. Akhil Rodriges message. Pt states he is in pain management with Dr. Anu Hui at [facility]. He states he will get in touch with them. PGY 1 Note discussed with supervising resident and primary attending    Patient is a 72y old  Female who presents with a chief complaint of dizziness    INTERVAL HPI/OVERNIGHT EVENTS: offers no new complaints, a small looking female sleeping comfortably in bed. According to the nurse pt is refusing all oral intakes including food and medications. Dr. Salguero will meet with the daughter to discuss Hospice and DNR/DNI in addition to short term placement.    MEDICATIONS  (STANDING):  enoxaparin Injectable 40 milliGRAM(s) SubCutaneous daily  aspirin  chewable 81 milliGRAM(s) Oral daily  lisinopril 20 milliGRAM(s) Oral daily  insulin glargine Injectable (LANTUS) 24 Unit(s) SubCutaneous at bedtime  atorvastatin 20 milliGRAM(s) Oral at bedtime  ticagrelor 90 milliGRAM(s) Oral two times a day  metoprolol succinate ER 25 milliGRAM(s) Oral daily  amLODIPine   Tablet 10 milliGRAM(s) Oral daily  memantine 5 milliGRAM(s) Oral at bedtime  cholecalciferol 1000 Unit(s) Oral daily  insulin lispro (HumaLOG) corrective regimen sliding scale   SubCutaneous three times a day before meals  cefTRIAXone   IVPB   IV Intermittent   cefTRIAXone   IVPB 1 Gram(s) IV Intermittent every 24 hours  mirtazapine 7.5 milliGRAM(s) Oral three times a day  insulin glargine Injectable (LANTUS) 12 Unit(s) SubCutaneous at bedtime  potassium acid phosphate/sodium acid phosphate tablet (K-PHOS No. 2) 1 Tablet(s) Oral four times a day with meals  predniSONE   Tablet 10 milliGRAM(s) Oral daily    MEDICATIONS  (PRN):  haloperidol    Injectable 1 milliGRAM(s) IV Push every 12 hours PRN Agitation      __________________________________________________  REVIEW OF SYSTEMS:   Hebrew Speaking and couldn't answer anything as she is legally blind and dementia    Vital Signs Last 24 Hrs  T(C): 36.6 (2017 11:34), Max: 37.1 (2017 04:56)  T(F): 97.8 (2017 11:34), Max: 98.8 (2017 04:56)  HR: 73 (2017 11:34) (57 - 75)  BP: 156/72 (2017 11:34) (127/64 - 164/68)  BP(mean): --  RR: 16 (2017 11:34) (16 - 18)  SpO2: 96% (2017 11:34) (96% - 100%)    ________________________________________________  PHYSICAL EXAM:  GENERAL: NAD, small looking Yi female, lying comfortably in bed and refusing food  HEENT: Normocephalic;  conjunctivae and sclerae clear; moist mucous membranes;   NECK : supple  CHEST/LUNG: Clear to auscultation bilaterally with good air entry b/l  HEART: S1 S2  regular; no murmurs, gallops or rubs  ABDOMEN: Soft, Nontender, Nondistended; Bowel sounds present  EXTREMITIES: no cyanosis; no edema; no calf tenderness  SKIN: warm and dry; no rash  NERVOUS SYSTEM:  Awake and alert; demented, not oriented    _________________________________________________  LABS:                        12.7   6.9   )-----------( 343      ( 2017 22:47 )             38.0     07-19    141  |  103  |  5<L>  ----------------------------<  114<H>  3.2<L>   |  29  |  0.71    Ca    8.5      2017 06:54  Phos  2.3     -  Mg     1.8     -    TPro  6.0  /  Alb  2.7<L>  /  TBili  0.4  /  DBili  x   /  AST  13  /  ALT  12  /  AlkPhos  87  07-17      Urinalysis Basic - ( 2017 01:16 )    Color: Yellow / Appearance: Clear / S.015 / pH: x  Gluc: x / Ketone: Negative  / Bili: Negative / Urobili: Negative   Blood: x / Protein: 30 mg/dL / Nitrite: Negative   Leuk Esterase: Moderate / RBC: 2-5 /HPF / WBC 11-25 /HPF   Sq Epi: x / Non Sq Epi: Few / Bacteria: Moderate /HPF      CAPILLARY BLOOD GLUCOSE  129 (2017 11:34)  128 (2017 08:08)  114 (2017 20:33)  156 (2017 16:03)            RADIOLOGY & ADDITIONAL TESTS:    Urine and Blood cultures are NTD.    Dr. Nina was consulted for suspicion of depression.    Plan of care was discussed with patient and /or primary care giver; all questions and concerns were addressed and care was aligned with patient's wishes.

## 2021-03-17 NOTE — ED ADULT TRIAGE NOTE - DIRECT TO ROOM CARE INITIATED:
PREOPERATIVE DIAGNOSIS: Retraction, bilateral upper lid.   POSTOPERATIVE DIAGNOSIS:  Retractrion,bilateral upper lid.   PROCEDURE:Bilateral  upper eyelid recession  SURGEON: Kenny Kessler MD   ASSISTANT:Derik Ibrahim MD   ANESTHESIA: Monitored with local infiltration of 1% lidocaine with epinephrine.   COMPLICATIONS: None.   ESTIMATED BLOOD LOSS: Less than 5 mL.   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 After the risks, benefits and alternatives to the proposed procedure were explained, informed consent was obtained.   DESCRIPTION OF PROCEDURE: Lucrecia Thornton  was brought to the operating room and placed supine on the operating table. Intravenous sedation was given. The bilateral upper lid crease and excess skin was marked with a marking pen and infiltrated with local anesthetic. The area was prepped and draped in the typical sterile ophthalmic fashion. Attention was directed to the right  side. Incision was made with a 15 blade and dissection carried down to the orbicularis with high temperature cautery. The skin was excised with the cautery. A strip of superior orbicularis was resected. The orbital septum was opened horizontally. The fat pads were debulked with the cautery.  The levator aponeurosis was identified and dissected from the superior tarsal border and the underlying Guan's muscleto bring the lid into a normal height and contour. The patient was asked to open the eye and the recession adjusted for height and contour.  The skin was closed with with running 6-0 plain gut sutures.  Ophthalmic ointment was applied to the incision. Attention was directed to the left side where the same procedure was performed. The patient tolerated the procedure well and left the operating room in stable condition.     KENNY KESSLER MD     
31-Jan-2020 07:51

## 2021-04-10 NOTE — DISCHARGE NOTE ADULT - PATIENT PORTAL LINK FT
1.42 “You can access the FollowHealth Patient Portal, offered by [facility], by registering with the following website: [email or web address]”

## 2022-02-24 NOTE — DISCHARGE NOTE ADULT - HOSPITAL COURSE
No indicators present
72 female w/ PMH of Dementia, Depression, DM, HTN, HLD, CAD x3 stents last stent in Aug 2016, Glaucoma, & legally blind.  Presented to the ED for decreased oral intake and weakness.    Admitted to medicine for Failure to Thrive     Failure to Thrive in Adult  Most likely due to worsening dementia and depression  Urinalysis showed no UTI, CXR showed elevation of Rt hemisphere, but no consolidation.  IV hydration given  Psychiatrist-Dr. Nina consulted    Acute kidney injury  Most likely due to decreased oral intake   Renal US negative for hydronephrosis  IV hydration given  Renal function monitored and normalized     Hyperglycemia  Finger sticks stable  Treated w/ Humalog and Lantus    Essential Hypertension  Blood pressure monitored and stable  Home medication continued    Depression  Psychiatrist-Dr. Nina consulted  Risperdal discontinued and changed to Haldol    DVT prophylaxis given

## 2022-05-30 NOTE — DISCHARGE NOTE ADULT - PATIENT PORTAL LINK FT
“You can access the FollowHealth Patient Portal, offered by [facility], by registering with the following website: [email or web address]” No

## 2022-07-19 NOTE — H&P ADULT. - ENMT
**For crisis resources, please see the information at the end of this document**   Patient Education    Thank you for coming to the [facility].    Lab Testing:  If you had lab testing today and your results are reassuring or normal they will be mailed to you or sent through SocialGuide within 7 days. If the lab tests need quick action we will call you with the results. The phone number we will call with results is # [phone] (home) . If this is not the best number please call our clinic and change the number.    Medication Refills:  If you need any refills please call your pharmacy and they will contact us. Our fax number for refills is [phone]. Please allow three business for refill processing. If you need to  your refill at a new pharmacy, please contact the new pharmacy directly. The new pharmacy will help you get your medications transferred.     Scheduling:  If you have any concerns about today's visit or wish to schedule another appointment please call our office during normal business hours [phone] (8-5:00 M-F)    Contact Us:  Please call [phone] during business hours (8-5:00 M-F).  If after clinic hours, or on the weekend, please call  [phone].    Financial Assistance [phone]  [facility] Billing [phone]  Central Billing Office, [facility]: [phone]  [facility] Billing [phone]  Medical Records [phone]  [facility] Patient Bill of Rights [email or web address]       MENTAL HEALTH CRISIS NUMBERS:  For a medical emergency please call  911 or go to the nearest ER.     [facility]:   [facility] -[phone]   Crisis Residence [facility]idence -[phone]   Walk-In Counseling Center [facility] -[phone]   COPE 24/7 [facility] Mobile Team -[phone] (adults)/[phone] (child)  CHILD: [facility] needs assessment team - [phone]       [facility]:   [facility] - [phone]   Walk-in counseling [facility] - [phone]   Walk-in counseling [facility] - [phone]   Crisis Residence [facility] - [phone]  Urgent Care Adult Mental [facility]-[phone] mobile unit/ 24/7 crisis line    National Crisis Numbers:   National Suicide Prevention Lifeline: 0-028-329-TALK (862-429-8107)  Poison Control Center - 8-630-668-3767  [employer]/resources for a list of additional resources (SOS)  Trans Lifeline a hotline for transgender people 4-835-067-5830  [employer] a hotline for LGBT youth 1-248.814.7831  Crisis Text Line: For any crisis 24/7   To: 401204  see www.crisistextline.org  - IF MAKING A CALL FEELS TOO HARD, send a text!         Again thank you for choosing [facility] and please let us know how we can best partner with you to improve you and your family's health.    You may be receiving a survey regarding this appointment. We would love to have your feedback, both positive and negative. The survey is done by an external company, so your answers are anonymous.      details… No oral lesions; no gross abnormalities

## 2023-02-17 NOTE — DIETITIAN INITIAL EVALUATION ADULT. - PHYSICAL APPEARANCE
"I have reviewed the surgical (or preoperative) H&P that is linked to this encounter, and examined the patient. There are no significant changes    Clinical Conditions Present on Arrival:  Clinically Significant Risk Factors Present on Admission                  # Drug Induced Coagulation Defect: home medication list includes an anticoagulant medication  # Drug Induced Platelet Defect: home medication list includes an antiplatelet medication  # Obesity: Estimated body mass index is 35.98 kg/m  as calculated from the following:    Height as of this encounter: 1.803 m (5' 11\").    Weight as of this encounter: 117 kg (258 lb).       "
emaciated/debilitated/cachectic, severe muscle wasting and subcutaneous fat loss

## 2023-05-03 NOTE — PATIENT PROFILE ADULT. - FUNCTIONAL SCREEN CURRENT LEVEL: AMBULATION, MLM
(3) assistive equipment and person Fluconazole Counseling:  Patient counseled regarding adverse effects of fluconazole including but not limited to headache, diarrhea, nausea, upset stomach, liver function test abnormalities, taste disturbance, and stomach pain.  There is a rare possibility of liver failure that can occur when taking fluconazole.  The patient understands that monitoring of LFTs and kidney function test may be required, especially at baseline. The patient verbalized understanding of the proper use and possible adverse effects of fluconazole.  All of the patient's questions and concerns were addressed.

## 2023-09-11 NOTE — ED ADULT NURSE NOTE - NS ED NURSE LEVEL OF CONSCIOUSNESS MENTAL STATUS
Awake/Alert Alert-The patient is alert, awake and responds to voice. The patient is oriented to time, place, and person. The triage nurse is able to obtain subjective information.

## 2024-05-23 NOTE — PROGRESS NOTE ADULT - PROBLEM SELECTOR PLAN 3
Anticoagulation Summary  As of 3/4/2021    INR goal:  1.5-2.5   TTR:  --   INR used for dosin.9 (3/4/2021)   Warfarin maintenance plan:  5 mg (5 mg x 1) every M, F; 7.5 mg (5 mg x 1.5) all other days   Weekly warfarin total:  47.5 mg   No change documented:  Jazzmine Shaw RN   Plan last modified:  Jazzmine Shaw RN (2020)   Next INR check:  3/11/2021   Target end date:           Anticoagulation Episode Summary     INR check location:      Preferred lab:      Send INR reminders to:  [facility] ONC [facility] NURSE MSG POOL    Comments:          Reviewed with patient chairside. Provided printed AVS.    Detail Level: Detailed Additional Notes: Documentation for MIPS  purposes only. Full Patient visit note from paper chart is available for review and also scanned in EMA chart. Rocephin due to UA positive. No WBC, No fever.   Asymptomatic but unreliable compliant as pt is demented.   - f/u [facility]

## 2024-05-31 NOTE — H&P ADULT - PROBLEM SELECTOR PROBLEM 1
Date of Operation:  5/31/24    Pre-operative diagnosis:  1. Chronic rhinosinusitis  2. Nasal obstruction  3. Bilateral turbinate hypertrophy    Post-operative diagnosis:   1. Chronic rhinosinusitis  2. Nasal obstruction  3. Bilateral turbinate hypertrophy    Surgeon:   Jaden West MD    Operation performed:   - Left Maxillary Antrostomy with Tissue Removal  - Left Total Ethmoidectomy  - Left Sphenoidotomy with Tissue Removal  - Left Frontal Sinusotomy  - Bilateral Inferior Turbinate Submucous Resection  - Use of Image Guidance      Estimated blood loss:   25ml    Disposition:  PACU for recovery then home    Indications:  This is a 40 year old male with a history of chronic rhinosinusitis and nasal obstruction. After a discussion of the rationale, risks, benefits and alternatives of the above procedures, consent was obtained.     Description of Procedure:  The patient was brought into the operating room and placed on the operating table in the supine position. General anesthesia was induced and the patient was intubated. The patient was then properly positioned and prepped and draped in the usual fashion.     Preop navigation scans were uploaded to the image guidance system. The images were reviewed for preoperative planning. Surface registration was performed and instruments were calibrated. Image guidance was used throughout the case to confirm landmarks such as skull base and orbit.     Topical 1:1000 epinephrine pledgets were placed in the nasal cavity. Starting with a 0-degree rigid nasal endoscope, the nasal cavity was examined.     LEFT MAXILLARY ANTROSTOMY  On the left, the middle turbinate was medialized using the Lacrosse elevator. Using a pediatric backbiter the uncinate was removed from inferior to superior, revealing the natural ostium of the maxillary sinus. This was further visualized with an angled scope.  The natural os was identified and expanded posteriorly. A microdebrider was then used to  expand the antrostomy and remove edematous tissue.    LEFT TOTAL ETHMOIDECTOMY  The ball probe was then used to identify the ethmoid bulla and fracture this forward.  Franco-cutting instruments and the microdebrider were then used to remove the remaining bulla and anterior ethmoid partitions. The basal lamella was then identified. This was entered through the previous opening made with the middle turbinate incision. The superior turbinate was identified and left intact. Posterior ethmoid partitions were then removed until the skull base and orbit were skeletonized.      LEFT SPHENOIDOTOMY  The superior turbinate was used as a guide to find the natural sphenoid os. On the medial side of the superior turbinate, the natural os was identified. This was further dilated using a freer elevator. Using a straight Hosemann punch, the sphenoidotomy was further widened.  A 45 degree through cutting instrument was then used to widen this area.    LEFT FRONTAL SINUSOTOMY  The navigation system was used to delineate the frontal sinus. A frontal navigation probe was used to identify the natural outflow tract. The remaining bulla and uncinate were used as landmarks.  The agger nasi cell was removed. The true outflow tract was gradually enlarged with throughcut instruments and the currette. Several partitions along the lamina were removed with the frontal Hosemann. The horizon sign was visualized, signifying a widely patent and complete frontal sinusotomy.     BILATERAL INFERIOR TURBINATE REDUCTION  On the left, a bovie incision was carried out at the head of the inferior turbinate.  A submucoperiosteal plane was then elevated over the bone of the inferior turbinate.  The bone was then removed in a piecemeal fashion with non-cutting instruments.  A microdebrider was then used in a submucosal fashion to reduce the tissue volume. Outfracture was then carried out with a Boies elevator.  On the right, a bovie incision was carried out at the  Altered mental status head of the inferior turbinate.  A submucoperiosteal plane was then elevated over the bone of the inferior turbinate.  The bone was then removed in a piecemeal fashion with non-cutting instruments.  A microdebrider was then used in a submucosal fashion to reduce the tissue volume. Outfracture was then carried out with a Boies elevator.      Hemostasis was achieved in bilateral nasal cavities. An orogastric tube was introduced to suction the stomach and pharynx.  The patient was then returned to anesthesia and the patient was awakened and extubated without any complications.     Jaden West MD  Otolaryngology - Head & Neck Surgery

## 2024-10-21 NOTE — PROGRESS NOTE BEHAVIORAL HEALTH - NS ED BHA MED ROS CONSTITUTIONAL SYMPTOMS
[de-identified] : Right Knee Exam:   Skin: Normal. No erythema, no ecchymosis, no abrasions, no scratches, no tattoos.                  Old Incisions: None. Healed arthroscopy portals.   Knee Joint Swelling:Positive effusion. Mild    Popliteal Swelling: None    Pre-Patella Bursa Swelling: None.   Alignment:           Valgus, Mild  Passively correctable to near neutral.    Knee Joint Line Tenderness: Tender at lateral joint line. Not tender at the medial joint line.   Not tender in the patellofemoral compartment.   Soft Tissue Tenderness: None.   Medial Collateral Ligament Laxity:  Good endpoint.                                                       Normal laxity. Good endpoint.   Lateral Collateral Ligament Laxity: Good endpoint       Lateral opening to varus stress.   Moderate Severe.   ROM Extension: 0 degrees.   ROM Flexion: 120 degrees.       ACL Testing:     Stable ACL. Good Endpoint.                                                                Lachman Test: Negative Anterior Drawer Test: Negative   PCL Testing:     Stable PCL.  Good Endpoint.                                                               Posterior Drawer Test: Negative   Patella Compression Test: Negative   Patellofemoral Crepitus: None.     Patellofemoral Apprehension Testing: Negative.   Patellofemoral Laxity: Normal.   Motor Strength:     Quadriceps strength is 5 out of 5                                                                Hamstring strength is 5 out of 5                                                               Ankle dorsiflexion strength is 5 out of 5                                                              Ankle plantarflexion strength is 5 out of 5   Sensation: Light tough sensation in the lower extremity is grossly normal.                                     Pulses:      Pulses are palpable at the ankle at the Dorsalis Pedis Artery.                                                               Pulses are palpable at the Posterior Tibialis Artery.                                                                 Osteophytes: There are no palpable osteophytes along the knee margins.                                                                  Hip Motion: The patient has full and painless hip range of motion.                                                          Hip Tenderness: The patient has no Greater Trochanteric hip bursa tenderness.                                                                   Lumbar Spine Symptoms: Negative straight leg raise.                                                              Gait: Limping Gait.  Abnormal gait.    Assistive Devices:  None  [de-identified] : X-ray of the Right Knee:  AP Standing View: Lateral joint space loss. Moderate. Severe. Bone on Bone. Medial joint space preserved.  PA Flexion View: Lateral joint space loss. Severe. Bone on Bone. Medial joint space preserved.   Lateral View:  Patellofemoral joint space preserved.  [place] View:  Patellofemoral joint space is preserved. Patellofemoral joint central tracking. Osteophytes seen on lateral femoral condyle  Bilateral Hip to Ankle Standing View: Lateral joint space loss. Moderate. Severe. on the right. Hips: No significant changes   No complaints

## 2024-12-23 NOTE — ED PROVIDER NOTE - CPE EDP EYES NORM
Order per Dr Hoffmann;  --Could take 2-3 months.  .INFORMED PATIENT & PATIENT VERBALIZED UNDERSTANDING.     normal...

## 2024-12-29 NOTE — BEHAVIORAL HEALTH ASSESSMENT NOTE - DIFFERENTIAL
0 (no pain/absence of nonverbal indicators of pain) Circadian rhythm disorder  Vascular dementia with behavioral disturbance  Delirium due to another medical condition

## 2025-01-08 NOTE — DISCHARGE NOTE NURSING/CASE MANAGEMENT/SOCIAL WORK - PATIENT PORTAL LINK FT
You can access the FollowMyHealth Patient Portal offered by [facility] by registering at the following website: [email or web address]. By joining [facility]’s FollowMyHealth portal, you will also be able to view your health information using other applications (apps) compatible with our system.
hide

## 2025-01-16 NOTE — BEHAVIORAL HEALTH ASSESSMENT NOTE - NS ED BHA TELEPSYCH PATIENT INTERVIEW
[facility]  Triage Provider Note    Patient: Odessa Pearce  Age: 19 year old  Gender: female    Vitals:    01/15/25 1759   BP: 106/70   Pulse: 82   Resp:    Temp: 98.8 °F (37.1 °C)     Pt presents c/o generalized chest pain, left rib pain, headache, difficulty breathing and cough x 4 days.     This note is to be is for triage purposes only.  There is no intention to diagnose, treat or disposition patient unless otherwise stated.    .       Yasemin Quintanilla, CNP  01/15/25 2082     Patient on 1:1